# Patient Record
Sex: FEMALE | Race: WHITE | NOT HISPANIC OR LATINO | Employment: OTHER | URBAN - METROPOLITAN AREA
[De-identification: names, ages, dates, MRNs, and addresses within clinical notes are randomized per-mention and may not be internally consistent; named-entity substitution may affect disease eponyms.]

---

## 2017-07-07 ENCOUNTER — ALLSCRIPTS OFFICE VISIT (OUTPATIENT)
Dept: OTHER | Facility: OTHER | Age: 22
End: 2017-07-07

## 2017-09-21 ENCOUNTER — LAB REQUISITION (OUTPATIENT)
Dept: LAB | Facility: HOSPITAL | Age: 22
End: 2017-09-21
Payer: COMMERCIAL

## 2017-09-21 ENCOUNTER — GENERIC CONVERSION - ENCOUNTER (OUTPATIENT)
Dept: OTHER | Facility: OTHER | Age: 22
End: 2017-09-21

## 2017-09-21 DIAGNOSIS — Z01.419 ENCOUNTER FOR GYNECOLOGICAL EXAMINATION WITHOUT ABNORMAL FINDING: ICD-10-CM

## 2017-09-21 PROCEDURE — G0143 SCR C/V CYTO,THINLAYER,RESCR: HCPCS | Performed by: OBSTETRICS & GYNECOLOGY

## 2017-10-04 LAB
LAB AP GYN PRIMARY INTERPRETATION: NORMAL
LAB AP LMP: NORMAL
Lab: NORMAL

## 2017-10-05 ENCOUNTER — ALLSCRIPTS OFFICE VISIT (OUTPATIENT)
Dept: OTHER | Facility: OTHER | Age: 22
End: 2017-10-05

## 2017-10-05 ENCOUNTER — APPOINTMENT (OUTPATIENT)
Dept: LAB | Facility: HOSPITAL | Age: 22
End: 2017-10-05
Payer: COMMERCIAL

## 2017-10-05 DIAGNOSIS — J02.9 ACUTE PHARYNGITIS: ICD-10-CM

## 2017-10-05 LAB
FLUAV AG SPEC QL IA: NEGATIVE
INFLUENZA B AG (HISTORICAL): NEGATIVE
S PYO AG THROAT QL: NEGATIVE

## 2017-10-05 PROCEDURE — 87070 CULTURE OTHR SPECIMN AEROBIC: CPT

## 2017-10-06 NOTE — PROGRESS NOTES
Assessment  1  Sore throat (462) (J02 9)   2  Fever (780 60) (R50 9)    Plan  Fever    · Rapid Flu A and B- POC; Source:Nose; Status:Complete;   Done: 59WML5930 03:33PM  Sore throat    · Amoxicillin-Pot Clavulanate 875-125 MG Oral Tablet (Amoxicillin-Pot Clavulanate);  TAKE 1 TABLET EVERY 12 HOURS WITH MEALS UNTIL GONE   · Follow Up if Not Better Evaluation and Treatment  Follow-up  Status: Complete  Done:  70TUY8143   · Drink plenty of fluids ; Status:Complete;   Done: 69HMQ8704   · Gargle with warm salt water for 5 minutes every 4 hours ; Status:Complete;   Done:  52UQR1766   · Stay home from work or school until your condition is improved ; Status:Complete;    Done: 13KBV3745   · The following home treatments may soothe a sore throat ; Status:Complete;   Done:  77FPJ7884   · Call [phone] if: You have pain in your ear ; Status:Complete;   Done: 68IYW7261   · Seek Immediate Medical Attention if: You become dehydrated ; Status:Complete;   Done:  34LGL7084   · Seek Immediate Medical Attention if: You get a severe headache that seems different  from your usual ones ; Status:Complete;   Done: 03SNU7781   · Seek Immediate Medical Attention if: Your swallowing difficulty is worse ;  Status:Complete;   Done: 41CHC2670   · (1) THROAT CULTURE (CULTURE, UPPER RESPIRATORY); Status:Active; Requested  ELLIOTT:41UQA3203;    · Rapid StrepA- POC; Source:Throat; Status:Complete;   Done: 43OIV6744 03:25PM    Chief Complaint  1  Cold Symptoms  Patient here with sinus pressure in head and ears post nasal drip nasal congestion headache sore throat      History of Present Illness  HPI: sore throat started yesterdaytodayin AM   Cold Symptoms: Blinda Jointer presents with complaints of cold symptoms     Associated symptoms include sore throat,-facial pressure,-facial pain,-ear pain,-nausea,-fever-and-chills, but-no nasal congestion,-no runny nose,-no dry cough,-no productive cough,-no headache,-no plugged ear(s),-no wheezing,-no shortness of breath,-no vomiting-and-no diarrhea  Review of Systems    Constitutional: as noted in HPI    ENT: as noted in HPI  Cardiovascular: as noted in HPI  Respiratory: as noted in HPI  Breasts: no complaints of breast pain, breast lump or nipple discharge  Gastrointestinal: as noted in HPI  Genitourinary: no complaints of dysuria, no incontinence, no pelvic pain, no dysmenorrhea, no vaginal discharge or abnormal vaginal bleeding  Musculoskeletal: no complaints of arthralgia, no myalgia, no joint swelling or stiffness, no limb pain or swelling  Integumentary: no complaints of skin rash or lesion, no itching or dry skin, no skin wounds  Neurological: no complaints of headache, no confusion, no numbness or tingling, no dizziness or fainting  Active Problems  1  Encounter for annual routine gynecological examination (V72 31) (Z01 419)   2  Encounter for PPD test (V74 1) (Z11 1)   3  Need for DTaP vaccine (V06 1) (Z23)   4  Need for prophylactic vaccination and inoculation against influenza (V04 81) (Z23)   5  Oral contraceptive prescribed (V25 01) (Z30 011)   6  Well adult on routine health check (V70 0) (Z00 00)    Family History  Mother    1  Family history of Healthy adult  Father    2  Family history of Diabetes  Family History Reviewed: The family history was reviewed and updated today  Social History   · Drinks coffee   · Never a smoker   · Rarely  The social history was reviewed and updated today  Surgical History  1  Denied: History Of Prior Surgery  Surgical History Reviewed: The surgical history was reviewed and updated today  Current Meds   1  Aviane 0 1-20 MG-MCG Oral Tablet; Take 1 tablet daily; Therapy: 88KCV2698 to (Evaluate:29Juq9924)  Requested for: 92Xfq5151; Last   Rx:31Cqt6602 Ordered    The medication list was reviewed and updated today  Allergies  1  No Known Drug Allergies  2  No Known Environmental Allergies   3   No Known Food Allergies    Vitals   Recorded: 29EZL4801 03:07PM   Temperature 102 F   Heart Rate 72   Respiration 18   Systolic 594   Diastolic 80   Height 5 ft 4 5 in   Weight 169 lb    BMI Calculated 28 56   BSA Calculated 1 83     Physical Exam    Constitutional   General appearance: No acute distress, well appearing and well nourished  Eyes   Conjunctiva and lids: No swelling, erythema or discharge  Pupils and irises: Equal, round and reactive to light  Ears, Nose, Mouth, and Throat   External inspection of ears and nose: Normal     Otoscopic examination: Tympanic membranes translucent with normal light reflex  Canals patent without erythema  Nasal mucosa, septum, and turbinates: Normal without edema or erythema  Oropharynx: Abnormal     Pulmonary   Respiratory effort: No increased work of breathing or signs of respiratory distress  Auscultation of lungs: Clear to auscultation  Cardiovascular   Auscultation of heart: Normal rate and rhythm, normal S1 and S2, without murmurs  Examination of extremities for edema and/or varicosities: Normal     Abdomen   Abdomen: Non-tender, no masses  Liver and spleen: No hepatomegaly or splenomegaly      Lymphatic   Palpation of lymph nodes in neck: Abnormal     Musculoskeletal   Gait and station: Normal          Results/Data  Rapid Flu A and B- POC 38BRK4505 03:33PM Cortney Monroe     Test Name Result Flag Reference   Rapid Flu A Negative     Rapid Flu B Negative       Rapid StrepA- POC 26QXD9824 03:25PM Cortney Monroe     Test Name Result Flag Reference   Rapid Strep Negative         Future Appointments    Date/Time Provider Specialty Site   07/10/2018 08:30 AM Patti Reid DO Family Medicine [facility] [facility]     Signatures   Electronically signed by : Bonnie Jesus DO; Oct  5 2017  3:36PM EST                       (Author)

## 2017-10-07 LAB — BACTERIA THROAT CULT: NORMAL

## 2017-10-08 ENCOUNTER — GENERIC CONVERSION - ENCOUNTER (OUTPATIENT)
Dept: OTHER | Facility: OTHER | Age: 22
End: 2017-10-08

## 2017-12-29 ENCOUNTER — ALLSCRIPTS OFFICE VISIT (OUTPATIENT)
Dept: OTHER | Facility: OTHER | Age: 22
End: 2017-12-29

## 2017-12-30 NOTE — PROGRESS NOTES
Assessment   1  Acute otitis media (382 9) (T04 90)    Plan   Acute otitis media    · Amoxicillin-Pot Clavulanate 875-125 MG Oral Tablet; TAKE 1 TABLET EVERY 12    HOURS WITH MEALS UNTIL GONE   · Ofloxacin 0 3 % Otic Solution; INSTILL 10 DROPS INTO THE AFFECTED EAR    ONCE DAILY FOR 7 DAYS    Discussion/Summary      Antibiotic as ordered drops as ordered otc per package instructions per package instructions with any problems      Possible side effects of new medications were reviewed with the patient/guardian today  The treatment plan was reviewed with the patient/guardian  The patient/guardian understands and agrees with the treatment plan      Chief Complaint   PT is being seen today due to having ear clogged with pain  History of Present Illness   HPI: LEFT EAR PAIN AND TROUBLE HEARING WITH SORE THROAT DRAINAGE FROM EAR FEVERS CONGESTION 3 WEEKS PREGNANT      Review of Systems        Constitutional: No fever, no chills, feels well, no tiredness, no recent weight gain or loss  ENT: as noted in HPI  Cardiovascular: no complaints of slow or fast heart rate, no chest pain, no palpitations, no leg claudication or lower extremity edema  Respiratory: as noted in HPI  Integumentary: no complaints of skin rash or lesion, no itching or dry skin, no skin wounds  Neurological: no complaints of headache, no confusion, no numbness or tingling, no dizziness or fainting  Active Problems   1  Oral contraceptive prescribed (V25 01) (Z30 011)   2  Well adult on routine health check (V70 0) (Z00 00)    Past Medical History   Active Problems And Past Medical History Reviewed: The active problems and past medical history were reviewed and updated today  Family History   Mother    1  Family history of Healthy adult  Father    2  Family history of Diabetes  Family History Reviewed: The family history was reviewed and updated today         Social History    · Drinks coffee   · Never a smoker   · Rarely  The social history was reviewed and updated today  Surgical History   1  Denied: History Of Prior Surgery  Surgical History Reviewed: The surgical history was reviewed and updated today  Current Meds    1  Aviane 0 1-20 MG-MCG Oral Tablet; Take 1 tablet daily; Therapy: 72AIL1940 to (Evaluate:42Ibi0578)  Requested for: 00Vtm6990; Last     Rx:64Jcf6808 Ordered     The medication list was reviewed and updated today  Allergies   1  No Known Drug Allergies  2  No Known Environmental Allergies   3  No Known Food Allergies    Vitals    Recorded: 29Dec2017 01:56PM   Temperature 98 1 F   Heart Rate 72   Respiration 16   Systolic 868   Diastolic 80   Height 5 ft 4 5 in   Weight 172 lb 6 oz   BMI Calculated 29 13   BSA Calculated 1 85     Physical Exam        Constitutional      General appearance: No acute distress, well appearing and well nourished  Eyes      Conjunctiva and lids: No swelling, erythema or discharge  Ears, Nose, Mouth, and Throat      External inspection of ears and nose: Normal        Otoscopic examination: Abnormal   The right tympanic membrane was normal  The left tympanic membrane was red-- and-- was bulging  The right external canal was normal  The left external canal was normal       Nasal mucosa, septum, and turbinates: Abnormal   normal nasal septum,-- no intranasal masses or polyps-- and-- normal nasal turbinates  There was a mucoid discharge from both nares  The bilateral nasal mucosa was edematous-- and-- red  Oropharynx: Abnormal   The posterior pharynx was erythematous, but-- did not have an exudate  Pulmonary      Respiratory effort: No increased work of breathing or signs of respiratory distress  Auscultation of lungs: Clear to auscultation  Cardiovascular      Auscultation of heart: Normal rate and rhythm, normal S1 and S2, without murmurs  Abdomen      Abdomen: Non-tender, no masses         Lymphatic Palpation of lymph nodes in neck: No lymphadenopathy  Musculoskeletal      Gait and station: Normal        Skin      Skin and subcutaneous tissue: Normal without rashes or lesions         Psychiatric      Orientation to person, place, and time: Normal        Mood and affect: Normal           Future Appointments      Date/Time Provider Specialty Site   07/10/2018 08:30 AM Kyler Roy 12    Electronically signed by : Nat Vann [address] [place] [address]; Dec 29 2017  2:35PM EST                       (Author)     Electronically signed by : Senthil Delgadillo MD; Dec 29 2017  3:21PM EST                       (Author)

## 2018-01-10 NOTE — RESULT NOTES
Verified Results  (1) THROAT CULTURE (CULTURE, UPPER RESPIRATORY) 05Oct2017 07:31PM Marisol Ja Order Number: [id number]     Test Name Result Flag Reference   CLINICAL REPORT (Report)     Test:        Throat culture  Specimen Type:   Throat  Specimen Date:   10/5/2017 7:31 PM  Result Date:    10/7/2017 8:31 AM  Result Status:   Final result  Resulting Lab:   [address] [address]            Tel: [phone]      CULTURE                                       ------------------                                   Negative for beta-hemolytic Streptococcus

## 2018-01-13 VITALS
SYSTOLIC BLOOD PRESSURE: 110 MMHG | RESPIRATION RATE: 18 BRPM | HEART RATE: 72 BPM | TEMPERATURE: 102 F | HEIGHT: 65 IN | DIASTOLIC BLOOD PRESSURE: 80 MMHG | BODY MASS INDEX: 28.16 KG/M2 | WEIGHT: 169 LBS

## 2018-01-13 NOTE — PROGRESS NOTES
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, clearing her medically  The patient was counseled regarding instructions for management, impressions, importance of compliance with treatment  The treatment plan was reviewed with the patient/guardian  The patient/guardian understands and agrees with the treatment plan      Chief Complaint  PT is being seen today for a HM visit  PT also had a TB test for her job  History of Present Illness  HPI: Recent graduate of [employer] in Education  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 [employer]  Seeing the Dentist   Immunizations are UTD - needed PPD today  Has started PAP smears with her OB/GYN  Pt exercises regularly  Non-smoker  No CP/SOB  No recent illnesses  No abd pain  Regular menses  No new breast lumps  No anxiety or depression  Review of Systems    ROS reviewed  Active Problems    1  Encounter for PPD test (V74 1) (Z11 1)   2  Need for DTaP vaccine (V06 1) (Z23)   3  Need for prophylactic vaccination and inoculation against influenza (V04 81) (Z23)   4  Oral contraceptive prescribed (V25 01) (Z30 011)   5  Well adult on routine health check (V70 0) (Z00 00)    Surgical History    · Denied: History Of Prior Surgery    Family History  Mother    · Family history of Healthy adult  Father    · Family history of Diabetes    Social History    · Drinks coffee   · Never a smoker   · Rarely    Current Meds   1   Aviane 0 1-20 MG-MCG Oral Tablet; TAKE 1 TABLET DAILY AS DIRECTED; Therapy: 26UMW0011 to (Evaluate:08Jun2017)  Requested for: 32Xla6446; Last   Rx:58Tqy3001 Ordered    Allergies    1  No Known Drug Allergies    2  No Known Environmental Allergies   3  No Known Food Allergies    Vitals   Recorded: 20YYH5864 08:31AM   Heart Rate 72   Respiration 16   Systolic 98   Diastolic 70   Height 5 ft 4 5 in   Weight 171 lb 6 oz   BMI Calculated 28 96   BSA Calculated 1 84     Physical Exam    Constitutional   General Appearance: No acute distress  NAD; VSS; pleasant  Head and Face   Head and face: Atraumatic on inspection  Facial strength normal    External inspection of ears and nose: Normal       Neck   Neck and thyroid: Normal to inspection and palpation    Pulmonary   Respiratory effort: Normal respiration, unlabored breathing  Auscultation of lungs: Clear to auscultation  Cardiovascular   Auscultation of heart: Rate is regular  Rhythm is regular   Abdomen   Abdomen: Soft, non-distended, non-tender  Abdomen: Positive bowel sounds  Musculoskeletal   Gait and Station: Walks with normal gait  Tandem walk test is normal  Romberg's test is negative          Neurologic   Cognition: No focal neurological deficits, intact cognition  Mental Status: Mood is normal  Affect is normal  Memory is intact  (Concentration) No apparent agnosia present  Thought process appears clear and appropriate  Judgment and insight: Normal          Results/Data  PHQ-2 Adult Depression Screening 79BAX0422 08:30AM User, Ahs     Test Name Result Flag Reference   PHQ-2 Adult Depression Score 0     Over the last two weeks, how often have you been bothered by any of the following problems?   Little interest or pleasure in doing things: Not at all - 0  Feeling down, depressed, or hopeless: Not at all - 0   PHQ-2 Adult Depression Screening Negative         Signatures   Electronically signed by : Peter Bello DO; Jul 7 2017  8:57AM EST                       (Author)

## 2018-01-18 NOTE — MISCELLANEOUS
Message  Return to work or school:  10/05/2017   She is able to return to work on  10/09/2017      PT WAS SEEN TODAY 10/05/2017 AND CAN RETURN ON MONDAY 10/09/2017  DR Dick Class        Signatures   Electronically signed by : Shaheen Reardon, ; Oct  5 2017  3:39PM EST                       (Author)

## 2018-01-22 VITALS
BODY MASS INDEX: 28.55 KG/M2 | SYSTOLIC BLOOD PRESSURE: 98 MMHG | RESPIRATION RATE: 16 BRPM | DIASTOLIC BLOOD PRESSURE: 70 MMHG | HEART RATE: 72 BPM | HEIGHT: 65 IN | WEIGHT: 171.38 LBS

## 2018-01-22 VITALS
HEIGHT: 65 IN | WEIGHT: 170 LBS | SYSTOLIC BLOOD PRESSURE: 100 MMHG | DIASTOLIC BLOOD PRESSURE: 64 MMHG | BODY MASS INDEX: 28.32 KG/M2

## 2018-01-23 VITALS
HEIGHT: 65 IN | DIASTOLIC BLOOD PRESSURE: 80 MMHG | HEART RATE: 72 BPM | RESPIRATION RATE: 16 BRPM | TEMPERATURE: 98.1 F | BODY MASS INDEX: 28.72 KG/M2 | SYSTOLIC BLOOD PRESSURE: 100 MMHG | WEIGHT: 172.38 LBS

## 2018-02-01 ENCOUNTER — TELEPHONE (OUTPATIENT)
Dept: FAMILY MEDICINE CLINIC | Facility: CLINIC | Age: 23
End: 2018-02-01

## 2018-02-01 DIAGNOSIS — Z20.828 EXPOSURE TO INFLUENZA: Primary | ICD-10-CM

## 2018-02-01 RX ORDER — OSELTAMIVIR PHOSPHATE 75 MG/1
75 CAPSULE ORAL DAILY
Qty: 10 CAPSULE | Refills: 0 | Status: SHIPPED | OUTPATIENT
Start: 2018-02-01 | End: 2018-02-11

## 2018-08-26 DIAGNOSIS — Z30.41 ENCOUNTER FOR SURVEILLANCE OF CONTRACEPTIVE PILLS: Primary | ICD-10-CM

## 2018-08-26 RX ORDER — LEVONORGESTREL AND ETHINYL ESTRADIOL 0.1-0.02MG
KIT ORAL
Qty: 28 TABLET | Refills: 11 | OUTPATIENT
Start: 2018-08-26

## 2018-08-27 DIAGNOSIS — Z30.41 ENCOUNTER FOR SURVEILLANCE OF CONTRACEPTIVE PILLS: Primary | ICD-10-CM

## 2018-08-27 RX ORDER — LEVONORGESTREL AND ETHINYL ESTRADIOL 0.1-0.02MG
1 KIT ORAL DAILY
Qty: 28 TABLET | Refills: 0 | Status: SHIPPED | OUTPATIENT
Start: 2018-08-27 | End: 2018-09-19 | Stop reason: SDUPTHER

## 2018-09-19 ENCOUNTER — ANNUAL EXAM (OUTPATIENT)
Dept: OBGYN CLINIC | Facility: CLINIC | Age: 23
End: 2018-09-19
Payer: COMMERCIAL

## 2018-09-19 ENCOUNTER — LAB REQUISITION (OUTPATIENT)
Dept: LAB | Facility: HOSPITAL | Age: 23
End: 2018-09-19
Payer: COMMERCIAL

## 2018-09-19 VITALS
HEIGHT: 65 IN | BODY MASS INDEX: 25.69 KG/M2 | SYSTOLIC BLOOD PRESSURE: 108 MMHG | WEIGHT: 154.2 LBS | DIASTOLIC BLOOD PRESSURE: 70 MMHG

## 2018-09-19 DIAGNOSIS — Z11.3 ENCOUNTER FOR SCREENING FOR INFECTIONS WITH PREDOMINANTLY SEXUAL MODE OF TRANSMISSION: ICD-10-CM

## 2018-09-19 DIAGNOSIS — Z11.3 SCREEN FOR STD (SEXUALLY TRANSMITTED DISEASE): ICD-10-CM

## 2018-09-19 DIAGNOSIS — Z01.419 ENCOUNTER FOR ANNUAL ROUTINE GYNECOLOGICAL EXAMINATION: Primary | ICD-10-CM

## 2018-09-19 DIAGNOSIS — Z30.41 ENCOUNTER FOR SURVEILLANCE OF CONTRACEPTIVE PILLS: ICD-10-CM

## 2018-09-19 PROCEDURE — 87591 N.GONORRHOEAE DNA AMP PROB: CPT | Performed by: OBSTETRICS & GYNECOLOGY

## 2018-09-19 PROCEDURE — 87661 TRICHOMONAS VAGINALIS AMPLIF: CPT | Performed by: OBSTETRICS & GYNECOLOGY

## 2018-09-19 PROCEDURE — S0612 ANNUAL GYNECOLOGICAL EXAMINA: HCPCS | Performed by: OBSTETRICS & GYNECOLOGY

## 2018-09-19 PROCEDURE — 87491 CHLMYD TRACH DNA AMP PROBE: CPT | Performed by: OBSTETRICS & GYNECOLOGY

## 2018-09-19 RX ORDER — LEVONORGESTREL AND ETHINYL ESTRADIOL 0.1-0.02MG
1 KIT ORAL DAILY
Qty: 28 TABLET | Refills: 11 | Status: SHIPPED | OUTPATIENT
Start: 2018-09-19 | End: 2019-09-06 | Stop reason: SDUPTHER

## 2018-09-19 NOTE — PROGRESS NOTES
Assessment/Plan:    Pap smear deferred due to low risk status  Cultures were obtained for leukorrhea panel  Encouraged self-breast examination as well as calcium supplementation  Continue OCPs x1 year  Return to office in 1 year  No problem-specific Assessment & Plan notes found for this encounter  Diagnoses and all orders for this visit:    Encounter for annual routine gynecological examination    Encounter for surveillance of contraceptive pills  -     levonorgestrel-ethinyl estradiol (AVIANE,ALESSE,LESSINA) 0 1-20 MG-MCG per tablet; Take 1 tablet by mouth daily          Subjective:      Patient ID: Radha Babcock is a 21 y o  female  HPI     This is a 27-year-old female G0 who presents for her annual gyn exam   She states her menstrual cycles are regular every 4 weeks lasting 4 days with no breakthrough bleeding  She denies any changes in bowel or bladder function  She is sexually active and has been in a monogamous relationship for the last 6 months  She does use condoms most of the time  She did receive the Gardasil vaccine at age 16  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, teaching third grade  Over the summer she did go abroad to [facility] ent each angle age for 5 weeks  The following portions of the patient's history were reviewed and updated as appropriate: allergies, current medications, past family history, past medical history, past social history, past surgical history and problem list     Review of Systems   Constitutional: Negative for fatigue, fever and unexpected weight change  Respiratory: Negative for cough, chest tightness, shortness of breath and wheezing  Cardiovascular: Negative  Negative for chest pain and palpitations  Gastrointestinal: Negative  Negative for abdominal distention, abdominal pain, blood in stool, constipation, diarrhea, nausea and vomiting  Genitourinary: Negative    Negative for difficulty urinating, dyspareunia, dysuria, flank pain, frequency, genital sores, hematuria, pelvic pain, urgency, vaginal bleeding, vaginal discharge and vaginal pain  Skin: Negative for rash  Objective:      /70   Ht 5' 5" (1 651 m)   Wt 69 9 kg (154 lb 3 2 oz)   LMP 09/12/2018 (Exact Date)   Breastfeeding? No   BMI 25 66 kg/m²          Physical Exam   Constitutional: She appears well-developed and well-nourished  Cardiovascular: Normal rate and regular rhythm  Pulmonary/Chest: Effort normal and breath sounds normal  Right breast exhibits no inverted nipple, no mass, no nipple discharge, no skin change and no tenderness  Left breast exhibits no inverted nipple, no mass, no nipple discharge, no skin change and no tenderness  Abdominal: Soft  Bowel sounds are normal  She exhibits no distension  There is no tenderness  There is no rebound and no guarding  Genitourinary: Vagina normal and uterus normal  There is no lesion on the right labia  There is no lesion on the left labia  Cervix exhibits no discharge and no friability  Right adnexum displays no mass, no tenderness and no fullness  Left adnexum displays no mass, no tenderness and no fullness  No vaginal discharge found

## 2018-09-21 LAB
CHLAMYDIA DNA CVX QL NAA+PROBE: NORMAL
N GONORRHOEA DNA GENITAL QL NAA+PROBE: NORMAL

## 2018-09-26 LAB — T VAGINALIS RRNA SPEC QL NAA+PROBE: NEGATIVE

## 2019-05-13 ENCOUNTER — TELEPHONE (OUTPATIENT)
Dept: OBGYN CLINIC | Facility: CLINIC | Age: 24
End: 2019-05-13

## 2019-09-06 DIAGNOSIS — Z30.41 ENCOUNTER FOR SURVEILLANCE OF CONTRACEPTIVE PILLS: ICD-10-CM

## 2019-09-08 RX ORDER — LEVONORGESTREL AND ETHINYL ESTRADIOL 0.1-0.02MG
KIT ORAL
Qty: 28 TABLET | Refills: 0 | Status: SHIPPED | OUTPATIENT
Start: 2019-09-08 | End: 2019-10-09 | Stop reason: SDUPTHER

## 2019-10-09 ENCOUNTER — ANNUAL EXAM (OUTPATIENT)
Dept: OBGYN CLINIC | Facility: CLINIC | Age: 24
End: 2019-10-09
Payer: COMMERCIAL

## 2019-10-09 VITALS
SYSTOLIC BLOOD PRESSURE: 132 MMHG | WEIGHT: 150.8 LBS | BODY MASS INDEX: 25.12 KG/M2 | HEIGHT: 65 IN | DIASTOLIC BLOOD PRESSURE: 76 MMHG

## 2019-10-09 DIAGNOSIS — Z30.41 ENCOUNTER FOR SURVEILLANCE OF CONTRACEPTIVE PILLS: ICD-10-CM

## 2019-10-09 DIAGNOSIS — Z01.419 ENCOUNTER FOR ANNUAL ROUTINE GYNECOLOGICAL EXAMINATION: Primary | ICD-10-CM

## 2019-10-09 PROCEDURE — S0612 ANNUAL GYNECOLOGICAL EXAMINA: HCPCS | Performed by: OBSTETRICS & GYNECOLOGY

## 2019-10-09 PROCEDURE — G0145 SCR C/V CYTO,THINLAYER,RESCR: HCPCS | Performed by: OBSTETRICS & GYNECOLOGY

## 2019-10-09 RX ORDER — LEVONORGESTREL AND ETHINYL ESTRADIOL 0.1-0.02MG
1 KIT ORAL DAILY
Qty: 28 TABLET | Refills: 11 | Status: SHIPPED | OUTPATIENT
Start: 2019-10-09 | End: 2020-09-08

## 2019-10-09 NOTE — PROGRESS NOTES
Assessment/Plan:  Pap smear done as well as annual    Offered STD screening, patient declines  Encouraged self-breast examination as well as calcium supplementation  Continue OCPs x1 year  Return to office in 1 year or p r n  No problem-specific Assessment & Plan notes found for this encounter  Diagnoses and all orders for this visit:    Encounter for annual routine gynecological examination    Encounter for surveillance of contraceptive pills  -     levonorgestrel-ethinyl estradiol (ORSYTHIA) 0 1-20 MG-MCG per tablet; Take 1 tablet by mouth daily          Subjective:      Patient ID: Dustin Jacobo is a 25 y o  female  HPI     This is a 63-year-old female G0 who presents for annual gyn exam   She offers no complaints today  Her menstrual cycles are regular every 28 days lasting 4 days with no breakthrough bleeding  She denies any changes in bowel or bladder function  She is sexually active and has been in a monogamous relationship for close to 2 years  She does not use condoms regularly  She did receive the Gardasil vaccine at age 16  Her Pap smears have been normal   Her sister was diagnosed with stage IB 1 cervical cancer at the age of 29 this past year  She underwent conservative surgery as she was interested in future pregnancies  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, teaching third grade  The following portions of the patient's history were reviewed and updated as appropriate: allergies, current medications, past family history, past medical history, past social history, past surgical history and problem list     Review of Systems   Constitutional: Negative for fatigue, fever and unexpected weight change  Respiratory: Negative for cough, chest tightness, shortness of breath and wheezing  Cardiovascular: Negative  Negative for chest pain and palpitations  Gastrointestinal: Negative    Negative for abdominal distention, abdominal pain, blood in stool, constipation, diarrhea, nausea and vomiting  Genitourinary: Negative  Negative for difficulty urinating, dyspareunia, dysuria, flank pain, frequency, genital sores, hematuria, pelvic pain, urgency, vaginal bleeding, vaginal discharge and vaginal pain  Skin: Negative for rash  Objective:      /76   Ht 5' 5" (1 651 m)   Wt 68 4 kg (150 lb 12 8 oz)   LMP 10/09/2019 (Exact Date)   Breastfeeding? No   BMI 25 09 kg/m²          Physical Exam   Constitutional: She appears well-developed and well-nourished  Cardiovascular: Normal rate and regular rhythm  Pulmonary/Chest: Effort normal and breath sounds normal  Right breast exhibits no inverted nipple, no mass, no nipple discharge, no skin change and no tenderness  Left breast exhibits no inverted nipple, no mass, no nipple discharge, no skin change and no tenderness  Abdominal: Soft  Bowel sounds are normal  She exhibits no distension  There is no tenderness  There is no rebound and no guarding  Genitourinary: Vagina normal and uterus normal  There is no lesion on the right labia  There is no lesion on the left labia  Cervix exhibits no discharge and no friability  Right adnexum displays no mass, no tenderness and no fullness  Left adnexum displays no mass, no tenderness and no fullness  No vaginal discharge found  Genitourinary Comments: Patient is menstruating today

## 2019-10-17 LAB
LAB AP GYN PRIMARY INTERPRETATION: NORMAL
LAB AP LMP: NORMAL
Lab: NORMAL

## 2020-07-21 ENCOUNTER — OFFICE VISIT (OUTPATIENT)
Dept: FAMILY MEDICINE CLINIC | Facility: CLINIC | Age: 25
End: 2020-07-21
Payer: COMMERCIAL

## 2020-07-21 VITALS
TEMPERATURE: 98.4 F | RESPIRATION RATE: 14 BRPM | BODY MASS INDEX: 25.19 KG/M2 | HEART RATE: 75 BPM | SYSTOLIC BLOOD PRESSURE: 100 MMHG | WEIGHT: 151.2 LBS | DIASTOLIC BLOOD PRESSURE: 74 MMHG | HEIGHT: 65 IN | OXYGEN SATURATION: 95 %

## 2020-07-21 DIAGNOSIS — F32.1 CURRENT MODERATE EPISODE OF MAJOR DEPRESSIVE DISORDER WITHOUT PRIOR EPISODE (HCC): Primary | ICD-10-CM

## 2020-07-21 PROBLEM — Z20.828 EXPOSURE TO INFLUENZA: Status: RESOLVED | Noted: 2018-02-01 | Resolved: 2020-07-21

## 2020-07-21 PROCEDURE — 99213 OFFICE O/P EST LOW 20 MIN: CPT | Performed by: FAMILY MEDICINE

## 2020-07-21 PROCEDURE — 3008F BODY MASS INDEX DOCD: CPT | Performed by: FAMILY MEDICINE

## 2020-07-21 PROCEDURE — 1036F TOBACCO NON-USER: CPT | Performed by: FAMILY MEDICINE

## 2020-07-21 NOTE — PROGRESS NOTES
Assessment/Plan:    1  Current moderate episode of major depressive disorder without prior episode [facility])  Assessment & Plan:  Start zoloft  1/2 tab  Side effects discussed  Cont therapy  Follow up in 4 weeks    Orders:  -     sertraline (ZOLOFT) 50 mg tablet; Take 1 tablet (50 mg total) by mouth daily    BMI Counseling: Body mass index is 25 16 kg/m²  The BMI is above normal  Nutrition recommendations include encouraging healthy choices of fruits and vegetables  Exercise recommendations include exercising 3-5 times per week  No pharmacotherapy was ordered  There are no Patient Instructions on file for this visit  No follow-ups on file  Subjective:      Patient ID: Nohemy Escudero is a 22 y o  female  Chief Complaint   Patient presents with    Depression     Patient would like to go on antidepressants        Seeing therapist  This past year has struggling with depression  No major life trauma  Therapy has helped  Feels depression the last few months  Prior felt more anxious    Depression   This is a chronic problem  The current episode started more than 1 year ago  The problem occurs constantly  The problem has been unchanged  Nothing aggravates the symptoms  Treatments tried: therapy  The treatment provided mild relief  The following portions of the patient's history were reviewed and updated as appropriate: allergies, current medications, past family history, past medical history, past social history, past surgical history and problem list     Review of Systems   Constitutional: Negative  HENT: Negative  Eyes: Negative  Respiratory: Negative  Cardiovascular: Negative  Gastrointestinal: Negative  Endocrine: Negative  Genitourinary: Negative  Musculoskeletal: Negative  Skin: Negative  Allergic/Immunologic: Negative  Neurological: Negative  Hematological: Negative  Psychiatric/Behavioral: Positive for depression, dysphoric mood and sleep disturbance  Negative for suicidal ideas  The patient is nervous/anxious  Current Outpatient Medications   Medication Sig Dispense Refill    levonorgestrel-ethinyl estradiol (ORSYTHIA) 0 1-20 MG-MCG per tablet Take 1 tablet by mouth daily 28 tablet 11    sertraline (ZOLOFT) 50 mg tablet Take 1 tablet (50 mg total) by mouth daily 30 tablet 5     No current facility-administered medications for this visit  Objective:    /74   Pulse 75   Temp 98 4 °F (36 9 °C)   Resp 14   Ht 5' 5" (1 651 m)   Wt 68 6 kg (151 lb 3 2 oz)   SpO2 95%   BMI 25 16 kg/m²        Physical Exam   Constitutional: She appears well-developed and well-nourished  HENT:   Head: Normocephalic and atraumatic  Eyes: Pupils are equal, round, and reactive to light  EOM are normal    Neck: Normal range of motion  Neck supple  Cardiovascular: Normal rate, regular rhythm, normal heart sounds and intact distal pulses  Pulmonary/Chest: Effort normal and breath sounds normal    Psychiatric: Her speech is normal and behavior is normal  Judgment and thought content normal  Cognition and memory are normal  She exhibits a depressed mood  tearful   Nursing note and vitals reviewed               Yulisa Bibanju, DO

## 2020-07-22 ENCOUNTER — TELEPHONE (OUTPATIENT)
Dept: FAMILY MEDICINE CLINIC | Facility: CLINIC | Age: 25
End: 2020-07-22

## 2020-07-22 NOTE — TELEPHONE ENCOUNTER
Dominga Hayes called stating that in her appt yesterday with Dr Queta Aldrich it was discussed to start taking 25 mg of Zoloft  Should she cut the 50 mg pill in half? How long should she take the 25mg before taking 50mg?  Please advise

## 2020-07-22 NOTE — TELEPHONE ENCOUNTER
Dr Raheem Crowder wants Maury Ridley to have a follow-up in 4 weeks, which would be the week of 08/17/20, however, she has no open appts  Where can I fit her in? She goes back to work 08/24/20 so that week would not be good  Please advise  Thank  You!

## 2020-08-19 ENCOUNTER — OFFICE VISIT (OUTPATIENT)
Dept: FAMILY MEDICINE CLINIC | Facility: CLINIC | Age: 25
End: 2020-08-19
Payer: COMMERCIAL

## 2020-08-19 VITALS
HEIGHT: 65 IN | HEART RATE: 81 BPM | RESPIRATION RATE: 14 BRPM | WEIGHT: 152.6 LBS | DIASTOLIC BLOOD PRESSURE: 60 MMHG | SYSTOLIC BLOOD PRESSURE: 110 MMHG | BODY MASS INDEX: 25.43 KG/M2 | OXYGEN SATURATION: 99 % | TEMPERATURE: 98.9 F

## 2020-08-19 DIAGNOSIS — F32.1 CURRENT MODERATE EPISODE OF MAJOR DEPRESSIVE DISORDER WITHOUT PRIOR EPISODE (HCC): Primary | ICD-10-CM

## 2020-08-19 PROCEDURE — 99213 OFFICE O/P EST LOW 20 MIN: CPT | Performed by: FAMILY MEDICINE

## 2020-08-19 PROCEDURE — 1036F TOBACCO NON-USER: CPT | Performed by: FAMILY MEDICINE

## 2020-08-19 PROCEDURE — 3008F BODY MASS INDEX DOCD: CPT | Performed by: FAMILY MEDICINE

## 2020-08-19 PROCEDURE — 3725F SCREEN DEPRESSION PERFORMED: CPT | Performed by: FAMILY MEDICINE

## 2020-08-19 NOTE — PROGRESS NOTES
Assessment/Plan:    1  Current moderate episode of major depressive disorder without prior episode [facility])  Assessment & Plan:  Improved with zoloft            There are no Patient Instructions on file for this visit  Return in about 3 months (around 11/19/2020)  Subjective:      Patient ID: Tamar Hernandez is a 22 y o  female  Chief Complaint   Patient presents with    Follow-up     Patient here for 4 week follow up on Depression       Here for follow up  Improved depression  No nausea  Sleep has been impacted  Usually does before school     Depression   The problem occurs constantly  She has tried sleep for the symptoms  The following portions of the patient's history were reviewed and updated as appropriate: allergies, current medications, past family history, past medical history, past social history, past surgical history and problem list     Review of Systems   Constitutional: Negative  HENT: Negative  Eyes: Negative  Respiratory: Negative  Cardiovascular: Negative  Endocrine: Negative  Genitourinary: Negative  Musculoskeletal: Negative  Skin: Negative  Allergic/Immunologic: Negative  Neurological: Negative  Hematological: Negative  Psychiatric/Behavioral: Positive for depression and dysphoric mood  Current Outpatient Medications   Medication Sig Dispense Refill    levonorgestrel-ethinyl estradiol (ORSYTHIA) 0 1-20 MG-MCG per tablet Take 1 tablet by mouth daily 28 tablet 11    sertraline (ZOLOFT) 50 mg tablet Take 1 tablet (50 mg total) by mouth daily 30 tablet 5     No current facility-administered medications for this visit  Objective:    /60   Pulse 81   Temp 98 9 °F (37 2 °C)   Resp 14   Ht 5' 5" (1 651 m)   Wt 69 2 kg (152 lb 9 6 oz)   SpO2 99%   BMI 25 39 kg/m²        Physical Exam  Vitals signs and nursing note reviewed  Constitutional:       Appearance: Normal appearance  HENT:      Head: Normocephalic and atraumatic  Eyes:      Extraocular Movements: Extraocular movements intact  Pupils: Pupils are equal, round, and reactive to light  Neck:      Musculoskeletal: Normal range of motion and neck supple  Cardiovascular:      Rate and Rhythm: Normal rate and regular rhythm  Pulmonary:      Effort: Pulmonary effort is normal       Breath sounds: Normal breath sounds  Abdominal:      General: Abdomen is flat  Skin:     General: Skin is warm  Capillary Refill: Capillary refill takes less than 2 seconds  Neurological:      General: No focal deficit present  Mental Status: She is alert  Mental status is at baseline  She is disoriented  Psychiatric:         Mood and Affect: Mood normal          Behavior: Behavior normal          Thought Content:  Thought content normal          Judgment: Judgment normal                 Kongl Aramk, DO

## 2020-09-08 DIAGNOSIS — Z30.41 ENCOUNTER FOR SURVEILLANCE OF CONTRACEPTIVE PILLS: ICD-10-CM

## 2020-09-08 RX ORDER — LEVONORGESTREL AND ETHINYL ESTRADIOL 0.1-0.02MG
KIT ORAL
Qty: 28 TABLET | Refills: 0 | Status: SHIPPED | OUTPATIENT
Start: 2020-09-08 | End: 2020-10-06

## 2020-09-21 DIAGNOSIS — F32.1 CURRENT MODERATE EPISODE OF MAJOR DEPRESSIVE DISORDER WITHOUT PRIOR EPISODE (HCC): ICD-10-CM

## 2020-10-06 DIAGNOSIS — Z30.41 ENCOUNTER FOR SURVEILLANCE OF CONTRACEPTIVE PILLS: ICD-10-CM

## 2020-10-06 RX ORDER — LEVONORGESTREL AND ETHINYL ESTRADIOL 0.1-0.02MG
KIT ORAL
Qty: 28 TABLET | Refills: 0 | Status: SHIPPED | OUTPATIENT
Start: 2020-10-06 | End: 2020-11-03

## 2020-11-03 DIAGNOSIS — Z30.41 ENCOUNTER FOR SURVEILLANCE OF CONTRACEPTIVE PILLS: ICD-10-CM

## 2020-11-03 RX ORDER — LEVONORGESTREL AND ETHINYL ESTRADIOL 0.1-0.02MG
KIT ORAL
Qty: 28 TABLET | Refills: 0 | Status: SHIPPED | OUTPATIENT
Start: 2020-11-03 | End: 2020-12-01

## 2020-12-01 ENCOUNTER — OFFICE VISIT (OUTPATIENT)
Dept: FAMILY MEDICINE CLINIC | Facility: CLINIC | Age: 25
End: 2020-12-01
Payer: COMMERCIAL

## 2020-12-01 VITALS
RESPIRATION RATE: 14 BRPM | HEART RATE: 79 BPM | SYSTOLIC BLOOD PRESSURE: 100 MMHG | DIASTOLIC BLOOD PRESSURE: 80 MMHG | OXYGEN SATURATION: 99 % | BODY MASS INDEX: 27.09 KG/M2 | HEIGHT: 65 IN | TEMPERATURE: 97.6 F | WEIGHT: 162.6 LBS

## 2020-12-01 DIAGNOSIS — Z30.41 ENCOUNTER FOR SURVEILLANCE OF CONTRACEPTIVE PILLS: ICD-10-CM

## 2020-12-01 DIAGNOSIS — F41.1 GENERALIZED ANXIETY DISORDER: ICD-10-CM

## 2020-12-01 DIAGNOSIS — F32.1 CURRENT MODERATE EPISODE OF MAJOR DEPRESSIVE DISORDER WITHOUT PRIOR EPISODE (HCC): Primary | ICD-10-CM

## 2020-12-01 DIAGNOSIS — Z23 NEED FOR VACCINATION: ICD-10-CM

## 2020-12-01 PROCEDURE — 99213 OFFICE O/P EST LOW 20 MIN: CPT | Performed by: FAMILY MEDICINE

## 2020-12-01 PROCEDURE — 3008F BODY MASS INDEX DOCD: CPT | Performed by: FAMILY MEDICINE

## 2020-12-01 PROCEDURE — 90471 IMMUNIZATION ADMIN: CPT

## 2020-12-01 PROCEDURE — 90686 IIV4 VACC NO PRSV 0.5 ML IM: CPT

## 2020-12-01 PROCEDURE — 3725F SCREEN DEPRESSION PERFORMED: CPT | Performed by: FAMILY MEDICINE

## 2020-12-01 RX ORDER — SERTRALINE HYDROCHLORIDE 100 MG/1
100 TABLET, FILM COATED ORAL DAILY
Qty: 30 TABLET | Refills: 5 | Status: SHIPPED | OUTPATIENT
Start: 2020-12-01 | End: 2021-06-02 | Stop reason: SDUPTHER

## 2020-12-01 RX ORDER — LEVONORGESTREL AND ETHINYL ESTRADIOL 0.1-0.02MG
KIT ORAL
Qty: 28 TABLET | Refills: 0 | Status: SHIPPED | OUTPATIENT
Start: 2020-12-01 | End: 2020-12-29

## 2020-12-05 ENCOUNTER — TELEMEDICINE (OUTPATIENT)
Dept: FAMILY MEDICINE CLINIC | Facility: CLINIC | Age: 25
End: 2020-12-05
Payer: COMMERCIAL

## 2020-12-05 ENCOUNTER — TELEPHONE (OUTPATIENT)
Dept: OTHER | Facility: OTHER | Age: 25
End: 2020-12-05

## 2020-12-05 DIAGNOSIS — B34.9 VIRAL INFECTION, UNSPECIFIED: Primary | ICD-10-CM

## 2020-12-05 DIAGNOSIS — B34.9 VIRAL INFECTION, UNSPECIFIED: ICD-10-CM

## 2020-12-05 PROCEDURE — 99213 OFFICE O/P EST LOW 20 MIN: CPT | Performed by: FAMILY MEDICINE

## 2020-12-05 PROCEDURE — U0003 INFECTIOUS AGENT DETECTION BY NUCLEIC ACID (DNA OR RNA); SEVERE ACUTE RESPIRATORY SYNDROME CORONAVIRUS 2 (SARS-COV-2) (CORONAVIRUS DISEASE [COVID-19]), AMPLIFIED PROBE TECHNIQUE, MAKING USE OF HIGH THROUGHPUT TECHNOLOGIES AS DESCRIBED BY CMS-2020-01-R: HCPCS | Performed by: FAMILY MEDICINE

## 2020-12-06 LAB — SARS-COV-2 RNA SPEC QL NAA+PROBE: NOT DETECTED

## 2020-12-29 DIAGNOSIS — Z30.41 ENCOUNTER FOR SURVEILLANCE OF CONTRACEPTIVE PILLS: ICD-10-CM

## 2020-12-29 RX ORDER — LEVONORGESTREL AND ETHINYL ESTRADIOL 0.1-0.02MG
KIT ORAL
Qty: 28 TABLET | Refills: 0 | Status: SHIPPED | OUTPATIENT
Start: 2020-12-29 | End: 2021-01-26

## 2021-01-22 ENCOUNTER — TELEPHONE (OUTPATIENT)
Dept: FAMILY MEDICINE CLINIC | Facility: CLINIC | Age: 26
End: 2021-01-22

## 2021-01-22 DIAGNOSIS — Z20.822 EXPOSURE TO COVID-19 VIRUS: ICD-10-CM

## 2021-01-22 DIAGNOSIS — Z20.822 EXPOSURE TO COVID-19 VIRUS: Primary | ICD-10-CM

## 2021-01-22 LAB — SARS-COV-2 RNA RESP QL NAA+PROBE: POSITIVE

## 2021-01-22 PROCEDURE — U0005 INFEC AGEN DETEC AMPLI PROBE: HCPCS | Performed by: FAMILY MEDICINE

## 2021-01-22 PROCEDURE — U0003 INFECTIOUS AGENT DETECTION BY NUCLEIC ACID (DNA OR RNA); SEVERE ACUTE RESPIRATORY SYNDROME CORONAVIRUS 2 (SARS-COV-2) (CORONAVIRUS DISEASE [COVID-19]), AMPLIFIED PROBE TECHNIQUE, MAKING USE OF HIGH THROUGHPUT TECHNOLOGIES AS DESCRIBED BY CMS-2020-01-R: HCPCS | Performed by: FAMILY MEDICINE

## 2021-01-22 NOTE — TELEPHONE ENCOUNTER
Patient exposed to dad who is covid positive, was last with dad on Monday, dad got results on Wednesday, No mask, less then 6ft more then 15 mins, Patient does have a stuffy nose, and sore throat, please advsie

## 2021-01-25 ENCOUNTER — TELEPHONE (OUTPATIENT)
Dept: FAMILY MEDICINE CLINIC | Facility: CLINIC | Age: 26
End: 2021-01-25

## 2021-01-25 NOTE — TELEPHONE ENCOUNTER
Pt called back she got a message to schedule virtual for today covid positive but you have nothing anywhere you would like me to squeeze her in?  Thank you

## 2021-01-26 DIAGNOSIS — Z30.41 ENCOUNTER FOR SURVEILLANCE OF CONTRACEPTIVE PILLS: ICD-10-CM

## 2021-01-26 RX ORDER — LEVONORGESTREL AND ETHINYL ESTRADIOL 0.1-0.02MG
KIT ORAL
Qty: 28 TABLET | Refills: 0 | Status: SHIPPED | OUTPATIENT
Start: 2021-01-26 | End: 2021-02-23

## 2021-01-27 ENCOUNTER — TELEMEDICINE (OUTPATIENT)
Dept: FAMILY MEDICINE CLINIC | Facility: CLINIC | Age: 26
End: 2021-01-27
Payer: COMMERCIAL

## 2021-01-27 DIAGNOSIS — U07.1 COVID-19: Primary | ICD-10-CM

## 2021-01-27 PROCEDURE — 99213 OFFICE O/P EST LOW 20 MIN: CPT | Performed by: FAMILY MEDICINE

## 2021-01-27 NOTE — PROGRESS NOTES
COVID-19 Virtual Visit     Assessment/Plan:    Problem List Items Addressed This Visit        Other    COVID-19 - Primary     Day 8  Overall improving  Discussed plasma donation-pt agreeable              Disposition:     I recommended self-quarantine for 14 days and to call back for worsening symptoms or development of shortness of breath  Return to work Feb !    I have spent 10 minutes directly with the patient  Encounter provider Jennie Queen DO    Provider located at [address] [place] [id number]    Recent Visits  Date Type Provider Dept   01/25/21 Telephone [address] [facility]   01/22/21 Telephone Alicia Mendoza [facility]   Showing recent visits within past 7 days and meeting all other requirements     Today's Visits  Date Type Provider Dept   01/27/21 Telemedicine Jennie Queen DO [facility]   Showing today's visits and meeting all other requirements     Future Appointments  No visits were found meeting these conditions  Showing future appointments within next 150 days and meeting all other requirements      This virtual check-in was done via [employer] and patient was informed that this is a secure, HIPAA-compliant platform  She agrees to proceed  Patient agrees to participate in a virtual check in via telephone or video visit instead of presenting to the office to address urgent/immediate medical needs  Patient is aware this is a billable service  After connecting through [facility], the patient was identified by name and date of birth  Mariely Wetzel was informed that this was a telemedicine visit and that the exam was being conducted confidentially over secure lines  My office door was closed  No one else was in the room  Mariely Wetzel acknowledged consent and understanding of privacy and security of the telemedicine visit   I informed the patient that I have reviewed her record in Epic and presented the opportunity for her to ask any questions regarding the visit today  The patient agreed to participate  Subjective:   Maren Cabrera is a 22 y o  female who is concerned about COVID-19  Patient's symptoms include fever (100 4resolved), fatigue, nasal congestion, sore throat (improved), anosmia, loss of taste, cough, nausea, myalgias and headache  Date of symptom onset: 1/20/2021    Exposure:   Contact with a person who is under investigation (PUI) for or who is positive for COVID-19 within the last 14 days?: Yes    Hospitalized recently for fever and/or lower respiratory symptoms?: No      Currently a healthcare worker that is involved in direct patient care?: No      Works in a special setting where the risk of COVID-19 transmission may be high? (this may include long-term care, correctional and skilled nursing facilities; homeless shelters; assisted-living facilities and group homes ): No      Resident in a special setting where the risk of COVID-19 transmission may be high? (this may include long-term care, correctional and skilled nursing facilities; homeless shelters; assisted-living facilities and group homes ): No      Father was negative  Possible from work    Lab Results   Component Value Date    Arely Bucy Positive (A) 01/22/2021    Arely Bucy Not Detected 12/05/2020     Past Medical History:   Diagnosis Date    Exposure to influenza 2/1/2018     Past Surgical History:   Procedure Laterality Date    NO PAST SURGERIES       Current Outpatient Medications   Medication Sig Dispense Refill    Larissia 0 1-20 MG-MCG per tablet TAKE ONE TABLET BY MOUTH EVERY DAY 28 tablet 0    sertraline (ZOLOFT) 100 mg tablet Take 1 tablet (100 mg total) by mouth daily 30 tablet 5     No current facility-administered medications for this visit  No Known Allergies    Review of Systems   Constitutional: Positive for fatigue and fever (100 4resolved)  HENT: Positive for congestion and sore throat (improved)  Eyes: Negative  Respiratory: Positive for cough  Cardiovascular: Negative  Gastrointestinal: Positive for nausea  Endocrine: Negative  Genitourinary: Negative  Musculoskeletal: Positive for myalgias  Skin: Negative  Allergic/Immunologic: Negative  Neurological: Positive for headaches  Hematological: Negative  Psychiatric/Behavioral: Negative  Objective:    Vitals:       Physical Exam  Constitutional:       Appearance: Normal appearance  HENT:      Head: Normocephalic and atraumatic  Eyes:      Extraocular Movements: Extraocular movements intact  Pupils: Pupils are equal, round, and reactive to light  Pulmonary:      Effort: Pulmonary effort is normal  No respiratory distress  Breath sounds: No wheezing  Musculoskeletal: Normal range of motion  Neurological:      General: No focal deficit present  Mental Status: She is alert and oriented to person, place, and time  Psychiatric:         Mood and Affect: Mood normal          Behavior: Behavior normal          Thought Content: Thought content normal          Judgment: Judgment normal        VIRTUAL VISIT DISCLAIMER    aMra Carreno acknowledges that she has consented to an online visit or consultation  She understands that the online visit is based solely on information provided by her, and that, in the absence of a face-to-face physical evaluation by the physician, the diagnosis she receives is both limited and provisional in terms of accuracy and completeness  This is not intended to replace a full medical face-to-face evaluation by the physician  Mara Carreno understands and accepts these terms

## 2021-01-27 NOTE — LETTER
Dear Dr Марина Orozco is interested in participating in the [facility] COVID-19 convalescent plasma collection program  Please see my attached note verifying eligibility  Sincerely,      DO Yeison Mcgee 22 y o  female   MRN: [id number]  1995    COVID-19 Convalescent Plasma Donor Attestation Verification of Eligibility     Yeison Cobian is a 22 y o  female who was diagnosed with COVID-19 infection, has recovered from the illness and wishes to participate in the [facility] convalescent plasma donation program to treat critically ill COVID-19 patients  Donor understands that they will be screened by [address] and if deemed a suitable candidate, donation appointment time will be arranged directly through [address] [facility]  They were also informed that their plasma will be in a donor pool and cannot be directed to a specific patient  Patient tested positive for 2019 Novel Coronavirus 2019 (SARS-CoV-2 RNA) on 1/22/2021  Patient is now recovered from COVID-19 and has been or will be symptom free for at least 28 days, effective 2/19/2021  To facilitate donation, eligibility form sent directly to [address] [facility]  Patients preferred contact number: [phone]    All of the patient's questions were answered via telephone  She is aware to call our office with any further questions or concerns as needed

## 2021-02-23 DIAGNOSIS — Z30.41 ENCOUNTER FOR SURVEILLANCE OF CONTRACEPTIVE PILLS: ICD-10-CM

## 2021-02-23 RX ORDER — LEVONORGESTREL AND ETHINYL ESTRADIOL 0.1-0.02MG
KIT ORAL
Qty: 28 TABLET | Refills: 0 | Status: SHIPPED | OUTPATIENT
Start: 2021-02-23 | End: 2021-03-08 | Stop reason: SDUPTHER

## 2021-03-08 ENCOUNTER — ANNUAL EXAM (OUTPATIENT)
Dept: OBGYN CLINIC | Facility: CLINIC | Age: 26
End: 2021-03-08
Payer: COMMERCIAL

## 2021-03-08 VITALS
BODY MASS INDEX: 28.32 KG/M2 | DIASTOLIC BLOOD PRESSURE: 72 MMHG | WEIGHT: 170 LBS | SYSTOLIC BLOOD PRESSURE: 114 MMHG | HEIGHT: 65 IN

## 2021-03-08 DIAGNOSIS — Z30.41 ENCOUNTER FOR SURVEILLANCE OF CONTRACEPTIVE PILLS: ICD-10-CM

## 2021-03-08 DIAGNOSIS — Z01.419 ENCOUNTER FOR ANNUAL ROUTINE GYNECOLOGICAL EXAMINATION: Primary | ICD-10-CM

## 2021-03-08 PROCEDURE — G0145 SCR C/V CYTO,THINLAYER,RESCR: HCPCS | Performed by: OBSTETRICS & GYNECOLOGY

## 2021-03-08 PROCEDURE — 3008F BODY MASS INDEX DOCD: CPT | Performed by: FAMILY MEDICINE

## 2021-03-08 PROCEDURE — S0612 ANNUAL GYNECOLOGICAL EXAMINA: HCPCS | Performed by: OBSTETRICS & GYNECOLOGY

## 2021-03-08 RX ORDER — LEVONORGESTREL AND ETHINYL ESTRADIOL 0.1-0.02MG
1 KIT ORAL DAILY
Qty: 28 TABLET | Refills: 11 | Status: SHIPPED | OUTPATIENT
Start: 2021-03-08 | End: 2022-01-11

## 2021-03-08 NOTE — PROGRESS NOTES
Assessment/Plan:    Pap smear done as well as annual   Encouraged self-breast examination as well as calcium supplementation  Continue OCPs x1 year  Return to office in 1 year or p r n  No problem-specific Assessment & Plan notes found for this encounter  Diagnoses and all orders for this visit:    Encounter for annual routine gynecological examination  -     Liquid-based pap, screening    Encounter for surveillance of contraceptive pills  -     levonorgestrel-ethinyl estradiol Mat Villarreal) 0 1-20 MG-MCG per tablet; Take 1 tablet by mouth daily          Subjective:      Patient ID: Katiana Rojas is a 22 y o  female  HPI      this is a very pleasant 42-year-old female G0 who presents for annual gyn exam   She offers no complaints today  Her menstrual cycles are regular every 4 weeks lasting 3 days with no breakthrough bleeding  She denies any changes in bowel or bladder function  She is sexually active and has been in a monogamous relationship for over 3 years  She did receive the Gardasil vaccine at age 16  Her Pap smears have been normal     There is a family history of cervical cancer, stage I B, sister diagnosed at 29  She has been in remission for 2 years  Patient is working full-time as elementary Education, teaching third grade  She is completing her master's degree this year  The following portions of the patient's history were reviewed and updated as appropriate: allergies, current medications, past family history, past medical history, past social history, past surgical history and problem list     Review of Systems   Constitutional: Negative for fatigue, fever and unexpected weight change  Respiratory: Negative for cough, chest tightness, shortness of breath and wheezing  Cardiovascular: Negative  Negative for chest pain and palpitations  Gastrointestinal: Negative    Negative for abdominal distention, abdominal pain, blood in stool, constipation, diarrhea, nausea and vomiting  Genitourinary: Negative  Negative for difficulty urinating, dyspareunia, dysuria, flank pain, frequency, genital sores, hematuria, pelvic pain, urgency, vaginal bleeding, vaginal discharge and vaginal pain  Skin: Negative for rash  Objective:      /72   Ht 5' 5" (1 651 m)   Wt 77 1 kg (170 lb)   LMP 02/24/2021   BMI 28 29 kg/m²          Physical Exam  Constitutional:       Appearance: Normal appearance  She is well-developed  Cardiovascular:      Rate and Rhythm: Normal rate and regular rhythm  Pulmonary:      Effort: Pulmonary effort is normal       Breath sounds: Normal breath sounds  Chest:      Breasts:         Right: No inverted nipple, mass, nipple discharge, skin change or tenderness  Left: No inverted nipple, mass, nipple discharge, skin change or tenderness  Abdominal:      General: Bowel sounds are normal  There is no distension  Palpations: Abdomen is soft  Tenderness: There is no abdominal tenderness  There is no guarding or rebound  Genitourinary:     Labia:         Right: No rash, tenderness or lesion  Left: No rash, tenderness or lesion  Vagina: Normal  No signs of injury  No vaginal discharge, tenderness or lesions  Cervix: No cervical motion tenderness, discharge, friability, lesion, erythema or cervical bleeding  Uterus: Not enlarged and not tender  Adnexa:         Right: No mass, tenderness or fullness  Left: No mass, tenderness or fullness  Skin:     General: Skin is warm and dry  Neurological:      Mental Status: She is alert and oriented to person, place, and time

## 2021-03-13 LAB
LAB AP GYN PRIMARY INTERPRETATION: NORMAL
LAB AP LMP: NORMAL
Lab: NORMAL

## 2021-05-11 ENCOUNTER — TELEMEDICINE (OUTPATIENT)
Dept: FAMILY MEDICINE CLINIC | Facility: CLINIC | Age: 26
End: 2021-05-11
Payer: COMMERCIAL

## 2021-05-11 VITALS — HEIGHT: 65 IN | WEIGHT: 157 LBS | BODY MASS INDEX: 26.16 KG/M2

## 2021-05-11 DIAGNOSIS — R19.7 DIARRHEA, UNSPECIFIED TYPE: ICD-10-CM

## 2021-05-11 DIAGNOSIS — Z20.822 EXPOSURE TO COVID-19 VIRUS: Primary | ICD-10-CM

## 2021-05-11 DIAGNOSIS — Z20.822 EXPOSURE TO COVID-19 VIRUS: ICD-10-CM

## 2021-05-11 PROBLEM — U07.1 COVID-19: Status: RESOLVED | Noted: 2021-01-27 | Resolved: 2021-05-11

## 2021-05-11 PROCEDURE — U0005 INFEC AGEN DETEC AMPLI PROBE: HCPCS | Performed by: FAMILY MEDICINE

## 2021-05-11 PROCEDURE — U0003 INFECTIOUS AGENT DETECTION BY NUCLEIC ACID (DNA OR RNA); SEVERE ACUTE RESPIRATORY SYNDROME CORONAVIRUS 2 (SARS-COV-2) (CORONAVIRUS DISEASE [COVID-19]), AMPLIFIED PROBE TECHNIQUE, MAKING USE OF HIGH THROUGHPUT TECHNOLOGIES AS DESCRIBED BY CMS-2020-01-R: HCPCS | Performed by: FAMILY MEDICINE

## 2021-05-11 PROCEDURE — 99213 OFFICE O/P EST LOW 20 MIN: CPT | Performed by: FAMILY MEDICINE

## 2021-05-11 NOTE — PROGRESS NOTES
COVID-19 Outpatient Progress Note    Assessment/Plan:    Problem List Items Addressed This Visit        Other    Diarrhea     Likely not covid  Will check swab to be safe           Exposure to COVID-19 virus - Primary    Relevant Orders    Novel Coronavirus (Covid-19),PCR [facility] - Collected at   [facility] or [facility]         Disposition:     I referred patient to one of our centralized sites for a COVID-19 swab  I have spent 7 minutes directly with the patient  Encounter provider Elizabeth Crews DO    Provider located at [address] [place] [address]  [address] [place] [id number]    Recent Visits  No visits were found meeting these conditions  Showing recent visits within past 7 days and meeting all other requirements     Today's Visits  Date Type Provider Dept   05/11/21 Telemedicine DO Ruiz Delgado   Showing today's visits and meeting all other requirements     Future Appointments  No visits were found meeting these conditions  Showing future appointments within next 150 days and meeting all other requirements      This virtual check-in was done via [employer] and patient was informed that this is a secure, HIPAA-compliant platform  She agrees to proceed  Patient agrees to participate in a virtual check in via telephone or video visit instead of presenting to the office to address urgent/immediate medical needs  Patient is aware this is a billable service  After connecting through [facility], the patient was identified by name and date of birth  Josie Humphries was informed that this was a telemedicine visit and that the exam was being conducted confidentially over secure lines  My office door was closed  No one else was in the room  Josie Humphries acknowledged consent and understanding of privacy and security of the telemedicine visit   I informed the patient that I have reviewed her record in Epic and presented the opportunity for her to ask any questions regarding the visit today  The patient agreed to participate  Subjective:   Ba Chavez is a 22 y o  female who is concerned about COVID-19  Patient's symptoms include diarrhea  Patient denies fever, chills, anosmia, loss of taste, nausea, vomiting, myalgias and headaches  Date of symptom onset: 5/11/2021    Exposure:   Contact with a person who is under investigation (PUI) for or who is positive for COVID-19 within the last 14 days?: No    Hospitalized recently for fever and/or lower respiratory symptoms?: No      Currently a healthcare worker that is involved in direct patient care?: No      Works in a special setting where the risk of COVID-19 transmission may be high? (this may include long-term care, correctional and retirement facilities; homeless shelters; assisted-living facilities and group homes ): No      Resident in a special setting where the risk of COVID-19 transmission may be high? 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  Also had covid in Dec 2020  Is already feeling better already    Lab Results   Component Value Date    SARSCOV2 Positive (A) 01/22/2021    Marilin Tejeda Not Detected 12/05/2020     Past Medical History:   Diagnosis Date    COVID-19 1/27/2021    Depression     Exposure to influenza 2/1/2018     Past Surgical History:   Procedure Laterality Date    NO PAST SURGERIES       Current Outpatient Medications   Medication Sig Dispense Refill    levonorgestrel-ethinyl estradiol (Larissia) 0 1-20 MG-MCG per tablet Take 1 tablet by mouth daily 28 tablet 11    sertraline (ZOLOFT) 100 mg tablet Take 1 tablet (100 mg total) by mouth daily 30 tablet 5     No current facility-administered medications for this visit  No Known Allergies    Review of Systems   Constitutional: Negative for chills and fever  HENT: Negative      Eyes: Negative  Respiratory: Negative  Cardiovascular: Negative  Gastrointestinal: Positive for diarrhea  Negative for nausea and vomiting  Endocrine: Negative  Genitourinary: Negative  Musculoskeletal: Negative for myalgias  Neurological: Negative for headaches  Objective:    Vitals:    05/11/21 1417   Weight: 71 2 kg (157 lb)   Height: 5' 5" (1 651 m)       Physical Exam  Constitutional:       Appearance: Normal appearance  Eyes:      Extraocular Movements: Extraocular movements intact  Pupils: Pupils are equal, round, and reactive to light  Pulmonary:      Effort: Pulmonary effort is normal  No respiratory distress  Breath sounds: No wheezing  Neurological:      General: No focal deficit present  Mental Status: She is alert and oriented to person, place, and time  Psychiatric:         Mood and Affect: Mood normal          Behavior: Behavior normal          Thought Content: Thought content normal          Judgment: Judgment normal        VIRTUAL VISIT DISCLAIMER    Shira Mendez acknowledges that she has consented to an online visit or consultation  She understands that the online visit is based solely on information provided by her, and that, in the absence of a face-to-face physical evaluation by the physician, the diagnosis she receives is both limited and provisional in terms of accuracy and completeness  This is not intended to replace a full medical face-to-face evaluation by the physician  Shira Mendez understands and accepts these terms

## 2021-05-12 LAB — SARS-COV-2 RNA RESP QL NAA+PROBE: NEGATIVE

## 2021-06-02 ENCOUNTER — OFFICE VISIT (OUTPATIENT)
Dept: FAMILY MEDICINE CLINIC | Facility: CLINIC | Age: 26
End: 2021-06-02
Payer: COMMERCIAL

## 2021-06-02 VITALS
HEIGHT: 65 IN | RESPIRATION RATE: 16 BRPM | BODY MASS INDEX: 28.12 KG/M2 | DIASTOLIC BLOOD PRESSURE: 88 MMHG | SYSTOLIC BLOOD PRESSURE: 110 MMHG | TEMPERATURE: 97.7 F | OXYGEN SATURATION: 99 % | WEIGHT: 168.8 LBS | HEART RATE: 76 BPM

## 2021-06-02 DIAGNOSIS — F41.1 GENERALIZED ANXIETY DISORDER: Primary | ICD-10-CM

## 2021-06-02 DIAGNOSIS — F32.1 CURRENT MODERATE EPISODE OF MAJOR DEPRESSIVE DISORDER WITHOUT PRIOR EPISODE (HCC): ICD-10-CM

## 2021-06-02 PROBLEM — Z20.822 EXPOSURE TO COVID-19 VIRUS: Status: RESOLVED | Noted: 2021-05-11 | Resolved: 2021-06-02

## 2021-06-02 PROBLEM — R19.7 DIARRHEA: Status: RESOLVED | Noted: 2021-05-11 | Resolved: 2021-06-02

## 2021-06-02 PROCEDURE — 3725F SCREEN DEPRESSION PERFORMED: CPT | Performed by: FAMILY MEDICINE

## 2021-06-02 PROCEDURE — 99213 OFFICE O/P EST LOW 20 MIN: CPT | Performed by: FAMILY MEDICINE

## 2021-06-02 PROCEDURE — 1036F TOBACCO NON-USER: CPT | Performed by: FAMILY MEDICINE

## 2021-06-02 PROCEDURE — 3008F BODY MASS INDEX DOCD: CPT | Performed by: FAMILY MEDICINE

## 2021-06-02 RX ORDER — SERTRALINE HYDROCHLORIDE 25 MG/1
25 TABLET, FILM COATED ORAL DAILY
Qty: 14 TABLET | Refills: 0 | Status: SHIPPED | OUTPATIENT
Start: 2021-06-02 | End: 2022-01-11

## 2021-06-02 NOTE — PROGRESS NOTES
Assessment/Plan:    1  Generalized anxiety disorder  Assessment & Plan:  Wean off zoloft      2  Current moderate episode of major depressive disorder without prior episode ([facility] [address] )  Assessment & Plan:  Wean off zoloft  Cont therapy      Orders:  -     sertraline (ZOLOFT) 25 mg tablet; Take 1 tablet (25 mg total) by mouth daily for 14 days    BMI Counseling: Body mass index is 28 09 kg/m²  The BMI is above normal  Nutrition recommendations include encouraging healthy choices of fruits and vegetables  Exercise recommendations include exercising 3-5 times per week  No pharmacotherapy was ordered  There are no Patient Instructions on file for this visit  No follow-ups on file  Subjective:      Patient ID: Nohemy Escudero is a 22 y o  female  Chief Complaint   Patient presents with    Follow-up     6 month f/u       Here for follow up  Would like to wean off zoloft  Has felt it increased eating and even with exercise weight has climb  Improved anxiety but not with depression    Anxiety  Presents for follow-up visit  Patient reports no excessive worry, nervous/anxious behavior or panic  Symptoms occur rarely  Compliance with medications is %  The following portions of the patient's history were reviewed and updated as appropriate: allergies, current medications, past family history, past medical history, past social history, past surgical history and problem list     Review of Systems   Constitutional: Negative  HENT: Negative  Eyes: Negative  Respiratory: Negative  Cardiovascular: Negative  Gastrointestinal: Negative  Endocrine: Negative  Genitourinary: Negative  Musculoskeletal: Negative  Allergic/Immunologic: Negative  Neurological: Negative  Hematological: Negative  Psychiatric/Behavioral: The patient is not nervous/anxious            Current Outpatient Medications   Medication Sig Dispense Refill    levonorgestrel-ethinyl estradiol Indira Land) 0 1-20 MG-MCG per tablet Take 1 tablet by mouth daily 28 tablet 11    sertraline (ZOLOFT) 25 mg tablet Take 1 tablet (25 mg total) by mouth daily for 14 days 14 tablet 0     No current facility-administered medications for this visit  Objective:    /88   Pulse 76   Temp 97 7 °F (36 5 °C) (Tympanic)   Resp 16   Ht 5' 5" (1 651 m)   Wt 76 6 kg (168 lb 12 8 oz)   SpO2 99%   BMI 28 09 kg/m²        Physical Exam  Vitals signs and nursing note reviewed  Constitutional:       Appearance: Normal appearance  HENT:      Head: Normocephalic and atraumatic  Eyes:      Extraocular Movements: Extraocular movements intact  Pupils: Pupils are equal, round, and reactive to light  Neck:      Musculoskeletal: Normal range of motion and neck supple  Cardiovascular:      Rate and Rhythm: Normal rate and regular rhythm  Pulses: Normal pulses  Heart sounds: Normal heart sounds  Pulmonary:      Effort: Pulmonary effort is normal       Breath sounds: Normal breath sounds  Abdominal:      General: Abdomen is flat  Skin:     Capillary Refill: Capillary refill takes less than 2 seconds  Neurological:      General: No focal deficit present  Mental Status: She is alert and oriented to person, place, and time  Psychiatric:         Mood and Affect: Mood normal          Behavior: Behavior normal          Thought Content:  Thought content normal          Judgment: Judgment normal                 Ledora , DO

## 2021-06-04 DIAGNOSIS — F32.1 CURRENT MODERATE EPISODE OF MAJOR DEPRESSIVE DISORDER WITHOUT PRIOR EPISODE (HCC): ICD-10-CM

## 2021-06-04 RX ORDER — SERTRALINE HYDROCHLORIDE 100 MG/1
TABLET, FILM COATED ORAL
Qty: 30 TABLET | Refills: 5 | Status: SHIPPED | OUTPATIENT
Start: 2021-06-04 | End: 2022-01-11

## 2022-01-11 ENCOUNTER — OFFICE VISIT (OUTPATIENT)
Dept: FAMILY MEDICINE CLINIC | Facility: CLINIC | Age: 27
End: 2022-01-11
Payer: COMMERCIAL

## 2022-01-11 VITALS
OXYGEN SATURATION: 99 % | BODY MASS INDEX: 30.22 KG/M2 | HEART RATE: 96 BPM | HEIGHT: 65 IN | DIASTOLIC BLOOD PRESSURE: 80 MMHG | RESPIRATION RATE: 16 BRPM | WEIGHT: 181.4 LBS | TEMPERATURE: 97.8 F | SYSTOLIC BLOOD PRESSURE: 110 MMHG

## 2022-01-11 DIAGNOSIS — Z00.00 ANNUAL PHYSICAL EXAM: Primary | ICD-10-CM

## 2022-01-11 DIAGNOSIS — F41.1 GENERALIZED ANXIETY DISORDER: ICD-10-CM

## 2022-01-11 DIAGNOSIS — F32.1 CURRENT MODERATE EPISODE OF MAJOR DEPRESSIVE DISORDER WITHOUT PRIOR EPISODE (HCC): ICD-10-CM

## 2022-01-11 PROBLEM — Z23 NEED FOR VACCINATION: Status: RESOLVED | Noted: 2020-12-01 | Resolved: 2022-01-11

## 2022-01-11 PROCEDURE — 99395 PREV VISIT EST AGE 18-39: CPT | Performed by: FAMILY MEDICINE

## 2022-01-11 RX ORDER — ESCITALOPRAM OXALATE 10 MG/1
10 TABLET ORAL DAILY
Qty: 30 TABLET | Refills: 5 | Status: SHIPPED | OUTPATIENT
Start: 2022-01-11 | End: 2022-04-28

## 2022-01-11 NOTE — PROGRESS NOTES
[facility]    NAME: Nae Pal  AGE: 32 y o  SEX: female  : 1995     DATE: 2022     Assessment and Plan:     Problem List Items Addressed This Visit        Other    Current moderate episode of major depressive disorder without prior episode ([address] )     Start lexapro  May need to add wellbutrin  Refer to psych  Requesting genesight testing         Relevant Medications    escitalopram (Lexapro) 10 mg tablet    Other Relevant Orders    Ambulatory Referral to Psychiatry    Comprehensive metabolic panel    CBC    Generalized anxiety disorder     Start lexapro  Was on zoloft previously         Relevant Medications    escitalopram (Lexapro) 10 mg tablet    Other Relevant Orders    Ambulatory Referral to Psychiatry    TSH, 3rd generation with Free T4 reflex    Annual physical exam - Primary          Immunizations and preventive care screenings were discussed with patient today  Appropriate education was printed on patient's after visit summary  Counseling:  · Dental Health: discussed importance of regular tooth brushing, flossing, and dental visits  BMI Counseling: Body mass index is 30 33 kg/m²  The BMI is above normal  Nutrition recommendations include encouraging healthy choices of fruits and vegetables  Exercise recommendations include exercising 3-5 times per week  No pharmacotherapy was ordered  Rationale for BMI follow-up plan is due to patient being overweight or obese  Return in 1 year (on 2023)  Chief Complaint:     Chief Complaint   Patient presents with    Annual Exam     discuss antidepressant medication      History of Present Illness:     Adult Annual Physical   Patient here for a comprehensive physical exam  The patient reports problems - depression/anxiety  Diet and Physical Activity  · Diet/Nutrition: well balanced diet  · Exercise: depression causing lack of motivation        Depression Screening  PHQ-2/9 Depression Screening    Little interest or pleasure in doing things: 2 - more than half the days  Feeling down, depressed, or hopeless: 2 - more than half the days  Trouble falling or staying asleep, or sleeping too much: 3 - nearly every day  Feeling tired or having little energy: 1 - several days  Poor appetite or overeatin - more than half the days  Feeling bad about yourself - or that you are a failure or have let yourself or your family down: 2 - more than half the days  Trouble concentrating on things, such as reading the newspaper or watching television: 1 - several days  Moving or speaking so slowly that other people could have noticed  Or the opposite - being so fidgety or restless that you have been moving around a lot more than usual: 0 - not at all  Thoughts that you would be better off dead, or of hurting yourself in some way: 1 - several days  PHQ-9 Score: 14   PHQ-9 Interpretation: Moderate depression        General Health  · Sleep: sleeps poorly  · Hearing: normal - bilateral   · Vision: no vision problems  · Dental: regular dental visits  /GYN Health  · Last menstrual period: regularish? · Contraceptive method: n/a   · History of STDs?: no      Review of Systems:     Review of Systems   Constitutional: Negative  HENT: Negative  Eyes: Negative  Respiratory: Negative  Cardiovascular: Negative  Gastrointestinal: Negative  Endocrine: Negative  Genitourinary: Negative  Musculoskeletal: Negative  Allergic/Immunologic: Negative  Neurological: Negative  Hematological: Negative  Psychiatric/Behavioral: Positive for decreased concentration and sleep disturbance  The patient is nervous/anxious         Past Medical History:     Past Medical History:   Diagnosis Date    COVID-19 2021    Depression     Exposure to influenza 2018      Past Surgical History:     Past Surgical History:   Procedure Laterality Date    NO PAST SURGERIES Social History:     Social History     Socioeconomic History    Marital status: Single     Spouse name: None    Number of children: None    Years of education: None    Highest education level: None   Occupational History    Occupation:    Tobacco Use    Smoking status: Never Smoker    Smokeless tobacco: Never Used   Vaping Use    Vaping Use: Never used   Substance and Sexual Activity    Alcohol use: Yes    Drug use: No    Sexual activity: Yes     Partners: Male     Birth control/protection: OCP   Other Topics Concern    None   Social History Narrative    Drinks coffee     Social Determinants of Health     Financial Resource Strain: Not on file   Food Insecurity: Not on file   Transportation Needs: Not on file   Physical Activity: Not on file   Stress: Not on file   Social Connections: Not on file   Intimate Partner Violence: Not on file   Housing Stability: Not on file      Family History:     Family History   Problem Relation Age of Onset    No Known Problems Mother     Diabetes Father     Cervical cancer Sister     No Known Problems Sister     No Known Problems Sister     No Known Problems Sister     No Known Problems Sister     No Known Problems Brother       Current Medications:     Current Outpatient Medications   Medication Sig Dispense Refill    escitalopram (Lexapro) 10 mg tablet Take 1 tablet (10 mg total) by mouth daily 30 tablet 5     No current facility-administered medications for this visit  Allergies:     No Known Allergies   Physical Exam:     /80 (BP Location: Left arm, Patient Position: Sitting, Cuff Size: Standard)   Pulse 96   Temp 97 8 °F (36 6 °C) (Temporal)   Resp 16   Ht 5' 4 84" (1 647 m)   Wt 82 3 kg (181 lb 6 4 oz)   SpO2 99%   BMI 30 33 kg/m²     Physical Exam  Vitals and nursing note reviewed  Constitutional:       Appearance: She is well-developed  HENT:      Head: Normocephalic and atraumatic        Right Ear: External ear normal       Left Ear: External ear normal       Nose: Nose normal    Eyes:      Extraocular Movements: Extraocular movements intact  Conjunctiva/sclera: Conjunctivae normal       Pupils: Pupils are equal, round, and reactive to light  Cardiovascular:      Rate and Rhythm: Normal rate and regular rhythm  Heart sounds: Normal heart sounds  Pulmonary:      Effort: Pulmonary effort is normal       Breath sounds: Normal breath sounds  Abdominal:      General: Abdomen is flat  Bowel sounds are normal       Palpations: Abdomen is soft  Musculoskeletal:         General: Normal range of motion  Cervical back: Normal range of motion and neck supple  Skin:     General: Skin is warm and dry  Capillary Refill: Capillary refill takes less than 2 seconds  Neurological:      Mental Status: She is alert and oriented to person, place, and time  Psychiatric:         Behavior: Behavior normal          Thought Content:  Thought content normal          Judgment: Judgment normal           Kevin Giraldo DO   [address]

## 2022-01-11 NOTE — PATIENT INSTRUCTIONS

## 2022-01-24 ENCOUNTER — APPOINTMENT (OUTPATIENT)
Dept: LAB | Age: 27
End: 2022-01-24
Payer: COMMERCIAL

## 2022-01-24 DIAGNOSIS — F32.1 CURRENT MODERATE EPISODE OF MAJOR DEPRESSIVE DISORDER WITHOUT PRIOR EPISODE (HCC): ICD-10-CM

## 2022-01-24 DIAGNOSIS — F41.1 GENERALIZED ANXIETY DISORDER: ICD-10-CM

## 2022-01-24 LAB
ALBUMIN SERPL BCP-MCNC: 4.2 G/DL (ref 3.5–5)
ALP SERPL-CCNC: 62 U/L (ref 46–116)
ALT SERPL W P-5'-P-CCNC: 27 U/L (ref 12–78)
ANION GAP SERPL CALCULATED.3IONS-SCNC: 4 MMOL/L (ref 4–13)
AST SERPL W P-5'-P-CCNC: 21 U/L (ref 5–45)
BILIRUB SERPL-MCNC: 0.73 MG/DL (ref 0.2–1)
BUN SERPL-MCNC: 8 MG/DL (ref 5–25)
CALCIUM SERPL-MCNC: 8.7 MG/DL (ref 8.3–10.1)
CHLORIDE SERPL-SCNC: 105 MMOL/L (ref 100–108)
CO2 SERPL-SCNC: 28 MMOL/L (ref 21–32)
CREAT SERPL-MCNC: 0.73 MG/DL (ref 0.6–1.3)
ERYTHROCYTE [DISTWIDTH] IN BLOOD BY AUTOMATED COUNT: 12.4 % (ref 11.6–15.1)
GFR SERPL CREATININE-BSD FRML MDRD: 114 ML/MIN/1.73SQ M
GLUCOSE P FAST SERPL-MCNC: 83 MG/DL (ref 65–99)
HCT VFR BLD AUTO: 45.8 % (ref 34.8–46.1)
HGB BLD-MCNC: 15.6 G/DL (ref 11.5–15.4)
MCH RBC QN AUTO: 30.4 PG (ref 26.8–34.3)
MCHC RBC AUTO-ENTMCNC: 34.1 G/DL (ref 31.4–37.4)
MCV RBC AUTO: 89 FL (ref 82–98)
PLATELET # BLD AUTO: 383 THOUSANDS/UL (ref 149–390)
PMV BLD AUTO: 9.1 FL (ref 8.9–12.7)
POTASSIUM SERPL-SCNC: 4 MMOL/L (ref 3.5–5.3)
PROT SERPL-MCNC: 8.3 G/DL (ref 6.4–8.2)
RBC # BLD AUTO: 5.14 MILLION/UL (ref 3.81–5.12)
SODIUM SERPL-SCNC: 137 MMOL/L (ref 136–145)
TSH SERPL DL<=0.05 MIU/L-ACNC: 0.94 UIU/ML (ref 0.36–3.74)
WBC # BLD AUTO: 7.25 THOUSAND/UL (ref 4.31–10.16)

## 2022-01-24 PROCEDURE — 85027 COMPLETE CBC AUTOMATED: CPT

## 2022-01-24 PROCEDURE — 80053 COMPREHEN METABOLIC PANEL: CPT

## 2022-01-24 PROCEDURE — 36415 COLL VENOUS BLD VENIPUNCTURE: CPT

## 2022-01-24 PROCEDURE — 84443 ASSAY THYROID STIM HORMONE: CPT

## 2022-03-02 ENCOUNTER — OFFICE VISIT (OUTPATIENT)
Dept: FAMILY MEDICINE CLINIC | Facility: CLINIC | Age: 27
End: 2022-03-02
Payer: COMMERCIAL

## 2022-03-02 VITALS
SYSTOLIC BLOOD PRESSURE: 90 MMHG | WEIGHT: 181 LBS | TEMPERATURE: 97.5 F | BODY MASS INDEX: 30.16 KG/M2 | RESPIRATION RATE: 13 BRPM | HEIGHT: 65 IN | DIASTOLIC BLOOD PRESSURE: 60 MMHG | OXYGEN SATURATION: 100 % | HEART RATE: 92 BPM

## 2022-03-02 DIAGNOSIS — F32.1 CURRENT MODERATE EPISODE OF MAJOR DEPRESSIVE DISORDER WITHOUT PRIOR EPISODE (HCC): ICD-10-CM

## 2022-03-02 DIAGNOSIS — F41.1 GENERALIZED ANXIETY DISORDER: Primary | ICD-10-CM

## 2022-03-02 PROCEDURE — 1036F TOBACCO NON-USER: CPT | Performed by: FAMILY MEDICINE

## 2022-03-02 PROCEDURE — 3008F BODY MASS INDEX DOCD: CPT | Performed by: FAMILY MEDICINE

## 2022-03-02 PROCEDURE — 3725F SCREEN DEPRESSION PERFORMED: CPT | Performed by: FAMILY MEDICINE

## 2022-03-02 PROCEDURE — 99213 OFFICE O/P EST LOW 20 MIN: CPT | Performed by: FAMILY MEDICINE

## 2022-03-02 NOTE — PROGRESS NOTES
Assessment/Plan:    1  Generalized anxiety disorder  Assessment & Plan:  Stable on lexparo      2  Current moderate episode of major depressive disorder without prior episode [facility])  Assessment & Plan:  Much improved on lexapro            There are no Patient Instructions on file for this visit  Return in about 3 months (around 6/2/2022)  Subjective:      Patient ID: Conchita Doshi is a 32 y o  female  Chief Complaint   Patient presents with    Follow-up     Patient here for follow up on Anxiety        Here for follow up  Feeling much better on lexapro  More situational anxiety  No side effects  Not crying as much    Anxiety  Presents for follow-up visit  Symptoms include depressed mood (improved) and nervous/anxious behavior (situation)  Symptoms occur rarely  Compliance with medications is %  The following portions of the patient's history were reviewed and updated as appropriate: allergies, current medications, past family history, past medical history, past social history, past surgical history and problem list     Review of Systems   Constitutional: Negative  HENT: Negative  Eyes: Negative  Respiratory: Negative  Cardiovascular: Negative  Gastrointestinal: Negative  Endocrine: Negative  Genitourinary: Negative  Psychiatric/Behavioral: The patient is nervous/anxious (situation)  Current Outpatient Medications   Medication Sig Dispense Refill    escitalopram (Lexapro) 10 mg tablet Take 1 tablet (10 mg total) by mouth daily 30 tablet 5    Multiple Vitamins-Calcium (ONE-A-DAY WOMENS FORMULA PO) Take by mouth       No current facility-administered medications for this visit  Objective:    BP 90/60 (BP Location: Left arm, Patient Position: Sitting, Cuff Size: Standard)   Pulse 92   Temp 97 5 °F (36 4 °C)   Resp 13   Ht 5' 4 84" (1 647 m)   Wt 82 1 kg (181 lb)   SpO2 100%   BMI 30 27 kg/m²        Physical Exam  Vitals and nursing note reviewed  Constitutional:       Appearance: Normal appearance  Neurological:      Mental Status: She is alert  Psychiatric:         Mood and Affect: Mood normal          Behavior: Behavior normal          Thought Content:  Thought content normal          Judgment: Judgment normal                 Saravanan Rival, DO

## 2022-04-07 ENCOUNTER — OFFICE VISIT (OUTPATIENT)
Dept: PSYCHIATRY | Facility: CLINIC | Age: 27
End: 2022-04-07
Payer: COMMERCIAL

## 2022-04-07 DIAGNOSIS — F32.1 CURRENT MODERATE EPISODE OF MAJOR DEPRESSIVE DISORDER WITHOUT PRIOR EPISODE (HCC): ICD-10-CM

## 2022-04-07 DIAGNOSIS — F41.1 GENERALIZED ANXIETY DISORDER: ICD-10-CM

## 2022-04-07 PROCEDURE — 90792 PSYCH DIAG EVAL W/MED SRVCS: CPT

## 2022-04-07 NOTE — PSYCH
Outpatient Psychiatry Intake Exam       PCP: Moises Gonzalez DO    Referral source: PCP    Identifying information:  Wallace Real is a 32 y o  female with a history of depression, anxiety here for evaluation and determination of mental health management needs  Sources of information:   Information for this evaluation was gathered through direct interview with the patient  Additionally EMR was reviewed  Confidentiality discussion: Limits of confidentiality in cases of safety concerns involving self and others as well as this physician's role as a mandatory  of abuse  They voiced understanding and a desire to continue with the evaluation  SUBJECTIVE     Chief Complaint / reason for visit: " Wanting to get help for my depression and anxiety for long time now "    History of Present Illness:    Quyen Seymour is a 30-year-old single female presenting for initial evaluation with past medical history of major depression, generalized anxiety disorder  Recently initiated on Lexapro 10 mg by her PCP in January and has seen some improvement in her symptoms since this time  She is interested in further medication management for symptoms and highly interested in gene testing  Reports struggling with depression since she was a teenager but previously kept her feelings bottled up inside of her, recently initiated therapy and patient reports some improvement in her symptoms  Reports 5/10 depression with anhedonia, dysphoria, lack of energy motivation, guilt regarding being a failure in life, difficulty with sleep onset and restless during the night  Reports depression symptoms were much worse back in November 2021 before the medication was initiated  Reports passive suicidal thoughts back in November 2021 but none since the initiation of Lexapro 10 mg  Reports fluctuating 6/10 anxiety levels with symptoms of fatigue, constant worry, irritable, restless, and history of panic attacks    Reports two panic attacks since January and fluctuating anxiety levels depending on what is going on her current life  Her main stressors contributing to symptoms include her career goals and her future as a teacher  She is a  and 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 [place] for her bacilio's new job  She has struggled to adjust to teaching in [facility] [employer] and has not felt comfortable there compared to her previous job in [place]  he recently decided to go back to the [employer] and is looking forward to this  Further stressors include finances, sister recently being diagnosed with cancer, and self-doubt in terms of not feeling good enough for her family and friends  Patient is working on these negative thought processes with her therapist  She lives with her supportive nazario and reports having a supportive family and friends as well  Patient is pleasant , cooperative, goal oriented and appears optimistic for the future  Denies suicidal ideation and denies history of psychosis or barrett  No prior history of psychiatric treatment in her lifetime  Medication trials include Zoloft and Lexapro both prescribed by PCP  She reports the Zoloft made her feel numb and would not like to trial the medication any time the future  She feels the Lexapro is more effective and much less side effects but patient would like to pursue gene testing to determine the best medication for her  She was given information regarding gene testing and she is going to call customer support to determine a price range for gene testing and scheduled a return appointment next week to perform the gene testing  Patient denies history of self-harm behaviors or suicide attempts  She plans to continue following up with her private therapist on a bimonthly schedule  Denies drug or alcohol abuse      Onset of symptoms was teenager a few years ago with gradually worsening course since that time  Stressors:  Career goals, recent move to [facility] ROS:  Medication Side Effects:  Denies  Depression:  5 /10 (10 worst)  Anxiety:  6 /10 (10 worst)  Safety (SI, HI, other):  Denies SI and HI  Sleep:  About 6 hours per night, restless in middle of night  Energy:  Low  Appetite:  About 3 meals per day sometimes admits to stress eating  Weight Change:  Denies      In terms of depression, the patient endorses loss of interests/pleasure, depressed mood, change in sleep, loss of energy, thoughts of worthlessness or guilt   In terms of bipolar disorder, the patient endorses no  Symptoms include no symptoms    VANDANA symptoms: excessive worry more days than not for longer than 3 months, difficulty concentrating, fatigue, insomnia, irritable and restlessness/keyed up  Panic Disorder symptoms: palpitations/racing heart, sweating  Social Anxiety symptoms: no symptoms suggestive of social anxiety  OCD Symptoms: No significant symptoms supportive of OCD  Eating Disorder symptoms: no historical or current eating disorder  no binge eating disorder; no anorexia nervosa  no symptoms of bulimia    In terms of PTSD, the patient endorses exposure to trauma involving: denies; intrusive symptoms including (1+): no intrusive symptoms; avoidance symptoms including (1+): no avoidance symptoms; Negative alterations including (2+): no negative alteration symptoms; hyperarousal symptoms including (2+): no arousal symptoms   Symptoms have been present for greater than 6 months    In terms of psychotic symptoms, the patient reports no psychotic symptoms now or in the past     Past Psychiatric History  Previous diagnoses include depression, anxiety    Prior outpatient psychiatric treatment:  Denies    Prior therapy:  Sees a private therapist in [facility]    Prior inpatient psychiatric treatment: denies    Prior suicide attempts: denies    Prior self harm: denies    Prior violence or aggression: denies    Social History:    The patient grew up in and [place]  Childhood was described as " normal nothing that I can think of"  During childhood, parents were were supportive as much as possible  They have 5 sister(s) and 1 brother(s)  Abuse/neglect: none    As far as the patient (or present family member) is aware, overall childhood development: Patient does ascribe to normal developmental milestones such as walking, talking, potty training and making childhood friends  Education level: Bachelor's degree   Current occupation:  Works as a   Marital status:  Single  Children:  None  Current Living Situation: the patient currently lives with her firiya   Social support:  Fiance and family    Caodaism Affiliation:  Denies   experience:  Denies  Legal history:  Denies  Access to Guns:  Denies    Substance use and treatment:  Tobacco use:  Denies  Caffeine Use:  1 cup per day  ETOH use:  Very occasional  Other substance use:  Denies      Endorses previous experimentation with:  Denies    Longest clean time: not applicable  History of Inpatient/Outpatient rehabilitation program: no      Traumatic History:     Abuse: none  Other Traumatic Events: none     Family Psychiatric History:     Psychiatric Illness:  Sister - depression and anxiety disorder  Substance Abuse:  no family history of substance abuse  Suicide Attempts:  no family history of suicide attempts    Denies     Family History   Problem Relation Age of Onset    No Known Problems Mother     Diabetes Father     Cervical cancer Sister     No Known Problems Sister     No Known Problems Sister     No Known Problems Sister     No Known Problems Sister     No Known Problems Brother             Past Medical / Surgical History:    Current PCP: Luciano Constantino DO   Other providers include:     Patient Active Problem List   Diagnosis    Current moderate episode of major depressive disorder without prior episode ([facility] [address] [facility]    Generalized anxiety disorder    Annual physical exam       Past Medical History-  Past Medical History:   Diagnosis Date    COVID-19 1/27/2021    Depression     Exposure to influenza 2/1/2018        Denies     History of Seizures: no  History of Head injury-LOC-Concussion: no    Past Surgical History-  Past Surgical History:   Procedure Laterality Date    NO PAST SURGERIES            Allergies:   No Known Allergies    Recent labs:  No visits with results within 1 Month(s) from this visit  Latest known visit with results is:   Appointment on 01/24/2022   Component Date Value    Sodium 01/24/2022 137     Potassium 01/24/2022 4 0     Chloride 01/24/2022 105     CO2 01/24/2022 28     ANION GAP 01/24/2022 4     BUN 01/24/2022 8     Creatinine 01/24/2022 0 73     Glucose, Fasting 01/24/2022 83     Calcium 01/24/2022 8 7     AST 01/24/2022 21     ALT 01/24/2022 27     Alkaline Phosphatase 01/24/2022 62     Total Protein 01/24/2022 8 3*    Albumin 01/24/2022 4 2     Total Bilirubin 01/24/2022 0 73     eGFR 01/24/2022 114     WBC 01/24/2022 7 25     RBC 01/24/2022 5 14*    Hemoglobin 01/24/2022 15 6*    Hematocrit 01/24/2022 45 8     MCV 01/24/2022 89     MCH 01/24/2022 30 4     MCHC 01/24/2022 34 1     RDW 01/24/2022 12 4     Platelets 45/39/2018 383     MPV 01/24/2022 9 1     TSH 3RD GENERATON 01/24/2022 0 944      Labs were reviewed    Medical Review Of Systems:    Patient admits to depression and; otherwise Pertinent items are noted in HPI  Medications:  Current Outpatient Medications on File Prior to Visit   Medication Sig Dispense Refill    escitalopram (Lexapro) 10 mg tablet Take 1 tablet (10 mg total) by mouth daily 30 tablet 5    Multiple Vitamins-Calcium (ONE-A-DAY WOMENS FORMULA PO) Take by mouth       No current facility-administered medications on file prior to visit  Medication Compliant? yes    All current active medications have been reviewed      Objective OBJECTIVE     There were no vitals taken for this visit  MENTAL STATUS EXAM  Appearance:  age appropriate, dressed casually   Behavior:  Pleasant & cooperative   Speech:  Normal volume, regular rate and rhythm   Mood:  depressed and anxious   Affect:  mood congruent   Language: intact and appropriate for age, education, and intellect   Thought Process:  Linear and goal directed   Associations: intact associations   Thought Content:  normal and appropriate   Perceptual Disturbances: no auditory or visual hallcunations   Risk Potential / Abnormal Thoughts: Suicidal ideation - None  Homicidal ideation - None  Potential for aggression - No       Consciousness:  Alert & Awake   Sensorium:  Grossly oriented   Attention: attention span and concentration are age appropriate   Intellect: within normal limits   Fund of Knowledge:  Memory: awareness of current events: yes  recent and remote memory grossly intact   Insight:  good   Judgment: good   Muscle Strength Muscle Tone: normal  normal   Gait/Station: normal gait/station with good balance   Motor Activity: no abnormal movements     Pain none   Pain Scale 0     IMPRESSIONS/FORMULATION          Diagnoses and all orders for this visit:    Current moderate episode of major depressive disorder without prior episode ([facility] )  -     Ambulatory Referral to Psychiatry    Generalized anxiety disorder  -     Ambulatory Referral to Psychiatry        1  Current moderate episode of major depressive disorder without prior episode ([facility] )    2  Generalized anxiety disorder          Wild Eubanks is a 32 y o  female who presents with symptoms supporting the aforementioned  Suicide / Homicide / Safety risk assessment: At this time Bronson Henry is at low risk for harm of self or others  Plan:       Education about diagnosis and treatment modalities, patient voiced understanding and agreement with the following plan:    Discussed medication risks, beneftis, alternatives   Patient was informed and had time to ask questions  They agreed to treatment below    Controlled Medication Discussion:     Not applicable    Initial treatment plan:   1) MEDS:    - continue Lexapro 10 mg daily for anxiety depression, patient prefers no medication changes until gene testing is performed next week during her next visit         2) Labs:  Reviewed    3) Therapy:    - continue with her private therapist    4) Medical:    - Pt will f/u with other providers as needed    5) Other: Support as needed   - complete gene testing   - use crisis as needed      6) Follow up:   - Follow up in 1 week or sooner if needed    - Patient will call if issues or concerns     7) Treatment Plan:    - Enacted on 4/7/22 by Eleazar Wagner, due 10/7/22     Discussed self monitoring of symptoms, and symptom monitoring tools  Patient has been informed of 24 hours and weekend coverage for urgent situations accessed by calling the main clinic phone number

## 2022-04-07 NOTE — BH TREATMENT PLAN
TREATMENT PLAN (Medication Management Only)        [facility]    Name and Date of Birth:  Sofiya Veloz 32 y o  1995  Date of Treatment Plan: April 7, 2022  Diagnosis/Diagnoses:    1  Current moderate episode of major depressive disorder without prior episode ([address] )    2  Generalized anxiety disorder      Strengths/Personal Resources for Self-Care: supportive family, supportive friends  Area/Areas of need (in own words): anxiety symptoms, depressive symptoms  1  Long Term Goal: alleviate acceptable anxiety level  Target Date:6 months - 10/7/2022  Person/Persons responsible for completion of goal: Asia Arenas  2  Short Term Objective (s) - How will we reach this goal?:   A  Provider new recommended medication/dosage changes and/or continue medication(s): continue all other medications  B  Attend medication management appointments regularly  C  Attend psychotherapy regularly  Target Date:6 months - 10/7/2022  Person/Persons Responsible for Completion of Goal: Asia Arenas and henrietta langley  Progress Towards Goals: starting treatment  Treatment Modality: medication management every 4 weeks  Review due 180 days from date of this plan: 6 months - 10/7/2022  Expected length of service: maintenance  My Physician/PA/NP and I have developed this plan together and I agree to work on the goals and objectives  I understand the treatment goals that were developed for my treatment 
No lymphadedenopathy

## 2022-04-15 ENCOUNTER — OFFICE VISIT (OUTPATIENT)
Dept: PSYCHIATRY | Facility: CLINIC | Age: 27
End: 2022-04-15
Payer: COMMERCIAL

## 2022-04-15 DIAGNOSIS — F41.1 GENERALIZED ANXIETY DISORDER: ICD-10-CM

## 2022-04-15 DIAGNOSIS — F32.1 CURRENT MODERATE EPISODE OF MAJOR DEPRESSIVE DISORDER WITHOUT PRIOR EPISODE (HCC): Primary | ICD-10-CM

## 2022-04-15 PROCEDURE — 99214 OFFICE O/P EST MOD 30 MIN: CPT

## 2022-04-15 PROCEDURE — 1036F TOBACCO NON-USER: CPT

## 2022-04-15 NOTE — PSYCH
Regular Visit    Problem List Items Addressed This Visit        Other    Current moderate episode of major depressive disorder without prior episode ([address] ) - Primary    Generalized anxiety disorder             Encounter provider COREY Lazo    Provider located at   [address] [address]  [phone]    Recent Visits  No visits were found meeting these conditions  Showing recent visits within past 7 days and meeting all other requirements  Today's Visits  Date Type Provider Dept   04/15/22 Office Visit COREY Lazo [facility]   Showing today's visits and meeting all other requirements  Future Appointments  No visits were found meeting these conditions  Showing future appointments within next 150 days and meeting all other requirements       HPI     Current Outpatient Medications   Medication Sig Dispense Refill    escitalopram (Lexapro) 10 mg tablet Take 1 tablet (10 mg total) by mouth daily 30 tablet 5    Multiple Vitamins-Calcium (ONE-A-DAY WOMENS FORMULA PO) Take by mouth       No current facility-administered medications for this visit  Review of Systems        MEDICATION MANAGEMENT NOTE        [facility]    Name and Date of Birth:  Wallace Real 32 y o  1995 MRN: [id number]    Date of Visit: April 15, 2022    No Known Allergies  SUBJECTIVE:    Quyen Seymour is seen today for a follow up for Major Depressive Disorder and Generalized Anxiety Disorder  She continues to do relatively well since the last visit    Patient here to complete scheduled Gene sight testing, initial visit was 5 days ago when she 1st inquired about GeneSight testing, she has since called Gene sight and was able to ask questions and find out the potential cost of it and she would like to follow through with the testing and it was completed during today's visit  She remains on Lexapro 10 mg for the management of anxiety and depression and is looking forward to discussing the testing results next week with provider  She reports no changes to her mental health and continues to report 5/10 anxiety and depression levels which are chronic in nature  Denies thoughts of self-harm or suicidal ideation  She denies any side effects from medications  PLAN:    Awaiting Gene sight testing results  Continue Lexapro 10 mg daily for anxiety and depression    Aware of 24 hour and weekend coverage for urgent situations accessed by calling [facility] main practice number  Continue psychotherapy with therapist    Diagnoses and all orders for this visit:    Current moderate episode of major depressive disorder without prior episode ([facility] [address] )    Generalized anxiety disorder        Current Outpatient Medications on File Prior to Visit   Medication Sig Dispense Refill    escitalopram (Lexapro) 10 mg tablet Take 1 tablet (10 mg total) by mouth daily 30 tablet 5    Multiple Vitamins-Calcium (ONE-A-DAY WOMENS FORMULA PO) Take by mouth       No current facility-administered medications on file prior to visit  HPI ROS Appetite Changes and Sleep:     She reports normal sleep, adequate appetite, low energy   Denies homicidal ideation, denies suicidal ideation    Review Of Systems:      HPI ROS:               Medication Side Effects:  denies si    Depression (10 worst): 5/10    Anxiety (10 worst): 5/10    Safety concerns (SI, HI, etc): Denies si and hi    Sleep: fair    Energy: low    Appetite: fair    Weight Change: denies        Mental Status Evaluation:    Appearance Adequate hygiene and grooming   Behavior calm and cooperative   Mood anxious and depressed  Depression Scale - 5 of 10 (0 = No depression)  Anxiety Scale - 5 of 10 (0 = No anxiety)   Speech Normal rate and volume   Affect appropriate   Thought Processes Goal directed and coherent   Thought Content Does not verbalize delusional material   Associations Tightly connected   Perceptual Disturbances Denies hallucinations and does not appear to be responding to internal stimuli   Risk Potential Suicidal/Homicidal Ideation - No evidence of suicidal or homicidal ideation and patient does not verbalize suicidal or homicidal ideation  Risk of Violence - No evidence of risk for violence found on assessment  Risk of Self Mutilation - No evidence of risk for self mutilation found on assessment   Orientation oriented to person, place, time/date and situation   Memory recent and remote memory grossly intact   Consciousness alert and awake   Attention/Concentration attention span and concentration are age appropriate   Insight intact   Judgement intact   Muscle Strength and Gait normal muscle strength and normal muscle tone, normal gait/station and normal balance   Motor Activity no abnormal movements   Language no difficulty naming common objects, no difficulty repeating a phrase, no difficulty writing a sentence   Fund of Knowledge adequate knowledge of current events  adequate fund of knowledge regarding past history  adequate fund of knowledge regarding vocabulary      Past Psychiatric History Update:     Inpatient Psychiatric Admission Since Last Encounter:   no  Changes to Outpatient Psychiatric Treatment Team:    no  Suicide Attempt Or Self Mutilation Since Last Encounter:   no  Incidence of Violent Behavior Since Last Encounter:   no    Traumatic History Update:     New Onset of Abuse Since Last Encounter:   no  Traumatic Events Since Last Encounter:   no    Past Medical History:    Past Medical History:   Diagnosis Date    COVID-19 1/27/2021    Depression     Exposure to influenza 2/1/2018     No past medical history pertinent negatives    Past Surgical History:   Procedure Laterality Date    NO PAST SURGERIES       No Known Allergies  Substance Abuse History:    Social History     Substance and Sexual Activity   Alcohol Use Yes     Social History     Substance and Sexual Activity   Drug Use No     Social History:    Social History     Socioeconomic History    Marital status: Single     Spouse name: Not on file    Number of children: Not on file    Years of education: Not on file    Highest education level: Not on file   Occupational History    Occupation:    Tobacco Use    Smoking status: Never Smoker    Smokeless tobacco: Never Used   Vaping Use    Vaping Use: Never used   Substance and Sexual Activity    Alcohol use: Yes    Drug use: No    Sexual activity: Yes     Partners: Male     Birth control/protection: OCP   Other Topics Concern    Not on file   Social History Narrative    Drinks coffee     Social Determinants of Health     Financial Resource Strain: Not on file   Food Insecurity: Not on file   Transportation Needs: Not on file   Physical Activity: Not on file   Stress: Not on file   Social Connections: Not on file   Intimate Partner Violence: Not on file   Housing Stability: Not on file     Family Psychiatric History:     Family History   Problem Relation Age of Onset    No Known Problems Mother     Diabetes Father     Cervical cancer Sister     No Known Problems Sister     No Known Problems Sister     No Known Problems Sister     No Known Problems Sister     No Known Problems Brother      History Review: The following portions of the patient's history were reviewed and updated as appropriate: allergies, current medications, past family history, past medical history, past social history, past surgical history and problem list     OBJECTIVE:     Vital signs in last 24 hours: There were no vitals filed for this visit  Laboratory Results: I have personally reviewed all pertinent laboratory/tests results      Suicide/Homicide Risk Assessment:    Risk of Harm to Self:  Protective Factors: no current suicidal ideation, access to mental health treatment, compliant with medications, compliant with mental health treatment  Based on today's assessment, Pippa Moon presents the following risk of harm to self: minimal    Risk of Harm to Others:  Based on today's assessment, Pippa Moon presents the following risk of harm to others: none    The following interventions are recommended: return in 2 weeks for reassessment    Medications Risks/Benefits:      Risks, Benefits And Possible Side Effects Of Medications:    Discussed risks and benefits of treatment with patient including risk of suicidality, serotonin syndrome, increased QTc interval and SIADH related to treatment with antidepressants; Risk of induction of manic symptoms in certain patient populations     Controlled Medication Discussion:     Not applicable    Treatment Plan:    Due for update/Updated:   no  Last treatment plan done 4/7/22 by henrietta langley  Treatment Plan due on 10/7/22      COREY Lauren 04/15/22

## 2022-04-28 DIAGNOSIS — F32.1 CURRENT MODERATE EPISODE OF MAJOR DEPRESSIVE DISORDER WITHOUT PRIOR EPISODE (HCC): ICD-10-CM

## 2022-04-28 DIAGNOSIS — F41.1 GENERALIZED ANXIETY DISORDER: ICD-10-CM

## 2022-04-28 RX ORDER — ESCITALOPRAM OXALATE 10 MG/1
TABLET ORAL
Qty: 90 TABLET | Refills: 1 | Status: SHIPPED | OUTPATIENT
Start: 2022-04-28

## 2022-07-19 ENCOUNTER — HOSPITAL ENCOUNTER (EMERGENCY)
Facility: HOSPITAL | Age: 27
Discharge: HOME | End: 2022-07-19
Attending: EMERGENCY MEDICINE | Admitting: EMERGENCY MEDICINE
Payer: COMMERCIAL

## 2022-07-19 VITALS
DIASTOLIC BLOOD PRESSURE: 76 MMHG | RESPIRATION RATE: 18 BRPM | TEMPERATURE: 98 F | HEART RATE: 102 BPM | OXYGEN SATURATION: 99 % | SYSTOLIC BLOOD PRESSURE: 117 MMHG

## 2022-07-19 DIAGNOSIS — B34.9 VIRAL ILLNESS: Primary | ICD-10-CM

## 2022-07-19 DIAGNOSIS — R50.9 FEVER AND CHILLS: ICD-10-CM

## 2022-07-19 DIAGNOSIS — M79.10 MYALGIA: ICD-10-CM

## 2022-07-19 LAB
ALBUMIN SERPL BCP-MCNC: 3.7 G/DL (ref 3.5–5)
ALP SERPL-CCNC: 50 U/L (ref 34–104)
ALT SERPL W P-5'-P-CCNC: 14 U/L (ref 7–52)
ANION GAP SERPL CALCULATED.3IONS-SCNC: 6 MMOL/L (ref 4–13)
AST SERPL W P-5'-P-CCNC: 26 U/L (ref 13–39)
B BURGDOR IGG+IGM SER-ACNC: 0.7 AI
BASOPHILS # BLD AUTO: 0.04 THOUSANDS/ΜL (ref 0–0.1)
BASOPHILS NFR BLD AUTO: 1 % (ref 0–1)
BILIRUB SERPL-MCNC: 0.31 MG/DL (ref 0.2–1)
BUN SERPL-MCNC: 5 MG/DL (ref 5–25)
CALCIUM SERPL-MCNC: 8.4 MG/DL (ref 8.4–10.2)
CHLORIDE SERPL-SCNC: 105 MMOL/L (ref 96–108)
CK MB SERPL-MCNC: 1.3 % (ref 0–2.5)
CK MB SERPL-MCNC: 2.5 NG/ML (ref 0.6–6.3)
CK SERPL-CCNC: 196 U/L (ref 26–192)
CO2 SERPL-SCNC: 27 MMOL/L (ref 21–32)
CREAT SERPL-MCNC: 0.55 MG/DL (ref 0.6–1.3)
EOSINOPHIL # BLD AUTO: 0.09 THOUSAND/ΜL (ref 0–0.61)
EOSINOPHIL NFR BLD AUTO: 1 % (ref 0–6)
ERYTHROCYTE [DISTWIDTH] IN BLOOD BY AUTOMATED COUNT: 11.9 % (ref 11.6–15.1)
EXT PREG TEST URINE: NEGATIVE
EXT. CONTROL ED NAV: NORMAL
FLUAV RNA RESP QL NAA+PROBE: NEGATIVE
FLUBV RNA RESP QL NAA+PROBE: NEGATIVE
GFR SERPL CREATININE-BSD FRML MDRD: 128 ML/MIN/1.73SQ M
GLUCOSE SERPL-MCNC: 93 MG/DL (ref 65–140)
HCT VFR BLD AUTO: 44.2 % (ref 34.8–46.1)
HGB BLD-MCNC: 14.9 G/DL (ref 11.5–15.4)
IMM GRANULOCYTES # BLD AUTO: 0.03 THOUSAND/UL (ref 0–0.2)
IMM GRANULOCYTES NFR BLD AUTO: 0 % (ref 0–2)
LYMPHOCYTES # BLD AUTO: 2.07 THOUSANDS/ΜL (ref 0.6–4.47)
LYMPHOCYTES NFR BLD AUTO: 29 % (ref 14–44)
MCH RBC QN AUTO: 31.1 PG (ref 26.8–34.3)
MCHC RBC AUTO-ENTMCNC: 33.7 G/DL (ref 31.4–37.4)
MCV RBC AUTO: 92 FL (ref 82–98)
MONOCYTES # BLD AUTO: 0.62 THOUSAND/ΜL (ref 0.17–1.22)
MONOCYTES NFR BLD AUTO: 9 % (ref 4–12)
NEUTROPHILS # BLD AUTO: 4.21 THOUSANDS/ΜL (ref 1.85–7.62)
NEUTS SEG NFR BLD AUTO: 60 % (ref 43–75)
NRBC BLD AUTO-RTO: 0 /100 WBCS
PLATELET # BLD AUTO: 271 THOUSANDS/UL (ref 149–390)
PMV BLD AUTO: 8.8 FL (ref 8.9–12.7)
POTASSIUM SERPL-SCNC: 3.9 MMOL/L (ref 3.5–5.3)
PROT SERPL-MCNC: 7.1 G/DL (ref 6.4–8.4)
RBC # BLD AUTO: 4.79 MILLION/UL (ref 3.81–5.12)
RSV RNA RESP QL NAA+PROBE: NEGATIVE
SARS-COV-2 RNA RESP QL NAA+PROBE: NEGATIVE
SODIUM SERPL-SCNC: 138 MMOL/L (ref 135–147)
WBC # BLD AUTO: 7.06 THOUSAND/UL (ref 4.31–10.16)

## 2022-07-19 PROCEDURE — 81025 URINE PREGNANCY TEST: CPT | Performed by: PHYSICIAN ASSISTANT

## 2022-07-19 PROCEDURE — 82553 CREATINE MB FRACTION: CPT | Performed by: PHYSICIAN ASSISTANT

## 2022-07-19 PROCEDURE — 0241U HB NFCT DS VIR RESP RNA 4 TRGT: CPT | Performed by: PHYSICIAN ASSISTANT

## 2022-07-19 PROCEDURE — 80053 COMPREHEN METABOLIC PANEL: CPT | Performed by: PHYSICIAN ASSISTANT

## 2022-07-19 PROCEDURE — 85025 COMPLETE CBC W/AUTO DIFF WBC: CPT | Performed by: PHYSICIAN ASSISTANT

## 2022-07-19 PROCEDURE — 96374 THER/PROPH/DIAG INJ IV PUSH: CPT

## 2022-07-19 PROCEDURE — 99283 EMERGENCY DEPT VISIT LOW MDM: CPT

## 2022-07-19 PROCEDURE — 86618 LYME DISEASE ANTIBODY: CPT | Performed by: PHYSICIAN ASSISTANT

## 2022-07-19 PROCEDURE — 99284 EMERGENCY DEPT VISIT MOD MDM: CPT | Performed by: PHYSICIAN ASSISTANT

## 2022-07-19 PROCEDURE — 82550 ASSAY OF CK (CPK): CPT | Performed by: PHYSICIAN ASSISTANT

## 2022-07-19 PROCEDURE — 36415 COLL VENOUS BLD VENIPUNCTURE: CPT | Performed by: PHYSICIAN ASSISTANT

## 2022-07-19 RX ORDER — KETOROLAC TROMETHAMINE 30 MG/ML
15 INJECTION, SOLUTION INTRAMUSCULAR; INTRAVENOUS ONCE
Status: COMPLETED | OUTPATIENT
Start: 2022-07-19 | End: 2022-07-19

## 2022-07-19 RX ADMIN — KETOROLAC TROMETHAMINE 15 MG: 30 INJECTION, SOLUTION INTRAMUSCULAR at 10:35

## 2022-07-19 NOTE — Clinical Note
Renata Pryor was seen and treated in our emergency department on 7/19/2022  Diagnosis:     Verenice Landeros  may return to work on return date  She may return on this date: 07/21/2022         If you have any questions or concerns, please don't hesitate to call        Korey Son, TIMOTHY    ______________________________           _______________          _______________  Hospital Representative                              Date                                Time

## 2022-07-19 NOTE — ED NOTES
Pt aware of need for urine sample  Unable to urinate at this time  Will notify RN when able to  Call bell within reach  No distress noted  Will continue to monitor        Magalys Oquendo RN  07/19/22 1017

## 2022-07-19 NOTE — ED PROVIDER NOTES
History  Chief Complaint   Patient presents with    Medical Problem     Patient reports flu like symptoms last week, now reports generalized body pain/aching as well as fevers  Patient is a 78-year-old female presenting to the ED for evaluation of flu-like symptoms x1 week  Patient reports myalgias/arthralgias, headaches and sore throat over the past 6 days  She has also had intermittent fevers (T-max of 101° F) that improved with Tylenol/Motrin  She took a home COVID test 6 days ago and 2 days ago that were both negative  She was seen at urgent care yesterday and had a negative COVID test and negative strep test  She thinks she might have the flu but was not tested for this yesterday  She feels that her skin is hyper-sensitive to touch and that all her muscles and joints hurt  She denies any joint swelling  She is not aware of any recent tick bites but says it is possible  She denies any sick contacts or known COVID exposures  She denies any cough, chest pain, SOB, palpitations, abdominal pain, N/V/D, constipation or urinary symptoms  Prior to Admission Medications   Prescriptions Last Dose Informant Patient Reported? Taking?    Multiple Vitamins-Calcium (ONE-A-DAY WOMENS FORMULA PO) 7/18/2022 at Unknown time Self Yes Yes   Sig: Take by mouth   escitalopram (LEXAPRO) 10 mg tablet 7/18/2022 at Unknown time  No Yes   Sig: TAKE 1 TABLET BY MOUTH EVERY DAY      Facility-Administered Medications: None       Past Medical History:   Diagnosis Date    COVID-19 1/27/2021    Depression     Exposure to influenza 2/1/2018       Past Surgical History:   Procedure Laterality Date    NO PAST SURGERIES         Family History   Problem Relation Age of Onset    No Known Problems Mother     Diabetes Father     Cervical cancer Sister     No Known Problems Sister     No Known Problems Sister     No Known Problems Sister     No Known Problems Sister     No Known Problems Brother      I have reviewed and agree with the history as documented  E-Cigarette/Vaping    E-Cigarette Use Never User      E-Cigarette/Vaping Substances    Nicotine No     THC No     CBD No     Flavoring No     Other No      Social History     Tobacco Use    Smoking status: Never Smoker    Smokeless tobacco: Never Used   Vaping Use    Vaping Use: Never used   Substance Use Topics    Alcohol use: Yes    Drug use: No       Review of Systems   Constitutional: Positive for chills and fever  Negative for appetite change and fatigue  HENT: Positive for sore throat  Negative for congestion, rhinorrhea, sinus pressure and sinus pain  Eyes: Negative for photophobia and visual disturbance  Respiratory: Negative for cough, shortness of breath and wheezing  Cardiovascular: Negative for chest pain, palpitations and leg swelling  Gastrointestinal: Negative for abdominal pain, blood in stool, constipation, diarrhea, nausea and vomiting  Genitourinary: Negative for difficulty urinating, dysuria, flank pain, frequency, hematuria and urgency  Musculoskeletal: Positive for arthralgias and myalgias  Negative for back pain, joint swelling and neck pain  Neurological: Positive for headaches  Negative for dizziness, syncope, weakness and light-headedness  All other systems reviewed and are negative  Physical Exam  Physical Exam  Vitals and nursing note reviewed  Constitutional:       General: She is awake  Appearance: Normal appearance  She is well-developed  She is not toxic-appearing or diaphoretic  HENT:      Head: Normocephalic and atraumatic  Right Ear: External ear normal       Left Ear: External ear normal       Nose: Nose normal       Mouth/Throat:      Lips: Pink  Mouth: Mucous membranes are moist       Pharynx: Oropharynx is clear  Uvula midline  No oropharyngeal exudate or posterior oropharyngeal erythema  Tonsils: No tonsillar exudate        Comments: No pharyngeal erythema or tonsillar exudate  Eyes:      General: Lids are normal  No scleral icterus  Conjunctiva/sclera: Conjunctivae normal       Pupils: Pupils are equal, round, and reactive to light  Cardiovascular:      Rate and Rhythm: Normal rate and regular rhythm  Pulses: Normal pulses  Radial pulses are 2+ on the right side and 2+ on the left side  Heart sounds: Normal heart sounds, S1 normal and S2 normal    Pulmonary:      Effort: Pulmonary effort is normal  No accessory muscle usage  Breath sounds: Normal breath sounds  No stridor  No decreased breath sounds, wheezing, rhonchi or rales  Comments: Lungs clear and equal to auscultation bilaterally  No wheezing, rhonchi or rales  Abdominal:      General: Abdomen is flat  Bowel sounds are normal  There is no distension  Palpations: Abdomen is soft  Tenderness: There is no abdominal tenderness  There is no right CVA tenderness, left CVA tenderness, guarding or rebound  Musculoskeletal:      Cervical back: Full passive range of motion without pain and neck supple  No signs of trauma  No pain with movement  Right lower leg: No edema  Left lower leg: No edema  Comments: No erythema, warmth or swelling of the joints  Lymphadenopathy:      Cervical: No cervical adenopathy  Skin:     General: Skin is warm and dry  Capillary Refill: Capillary refill takes less than 2 seconds  Coloration: Skin is not cyanotic, jaundiced or pale  Comments: No rash noted  Neurological:      Mental Status: She is alert and oriented to person, place, and time  GCS: GCS eye subscore is 4  GCS verbal subscore is 5  GCS motor subscore is 6  Gait: Gait normal    Psychiatric:         Mood and Affect: Mood normal          Speech: Speech normal          Behavior: Behavior is cooperative           Vital Signs  ED Triage Vitals [07/19/22 0945]   Temperature Pulse Respirations Blood Pressure SpO2   98 °F (36 7 °C) 102 18 117/76 99 % Temp Source Heart Rate Source Patient Position - Orthostatic VS BP Location FiO2 (%)   Oral Monitor Sitting Right arm --      Pain Score       9           Vitals:    07/19/22 0945   BP: 117/76   Pulse: 102   Patient Position - Orthostatic VS: Sitting         Visual Acuity      ED Medications  Medications   ketorolac (TORADOL) injection 15 mg (15 mg Intravenous Given 7/19/22 1035)       Diagnostic Studies  Results Reviewed     Procedure Component Value Units Date/Time    CKMB [[id number]]  (Normal) Collected: 07/19/22 1009    Lab Status: Final result Specimen: Blood from Arm, Right Updated: 07/19/22 1148     CK-MB Index 1 3 %      CK-MB 2 5 ng/mL     FLU/RSV/COVID - if FLU/RSV clinically relevant [[id number]]  (Normal) Collected: 07/19/22 1009    Lab Status: Final result Specimen: Nares from Nose Updated: 07/19/22 1101     SARS-CoV-2 Negative     INFLUENZA A PCR Negative     INFLUENZA B PCR Negative     RSV PCR Negative    Narrative:      FOR PEDIATRIC PATIENTS - copy/paste COVID Guidelines URL to browser: [email or web address]  [employer]    SARS-CoV-2 assay is a Nucleic Acid Amplification assay intended for the  qualitative detection of nucleic acid from SARS-CoV-2 in nasopharyngeal  swabs  Results are for the presumptive identification of SARS-CoV-2 RNA  Positive results are indicative of infection with SARS-CoV-2, the virus  causing COVID-19, but do not rule out bacterial infection or co-infection  with other viruses  Laboratories within the [place] and its  territories are required to report all positive results to the appropriate  public health authorities  Negative results do not preclude SARS-CoV-2  infection and should not be used as the sole basis for treatment or other  patient management decisions  Negative results must be combined with  clinical observations, patient history, and epidemiological information    This test has not been FDA cleared or approved  This test has been authorized by FDA under an Emergency Use Authorization  (EUA)  This test is only authorized for the duration of time the  declaration that circumstances exist justifying the authorization of the  emergency use of an in vitro diagnostic tests for detection of SARS-CoV-2  virus and/or diagnosis of COVID-19 infection under section 564(b)(1) of  the Act, 21 U  S C  857W[place]X-2(N)(6), unless the authorization is terminated  or revoked sooner  The test has been validated but independent review by FDA  and CLIA is pending  Test performed using [employer] Gene[employer]pert: This RT-PCR assay targets N2,  a region unique to SARS-CoV-2  A conserved region in the E-gene was chosen  for pan-Sarbecovirus detection which includes SARS-CoV-2      CK [[id number]]  (Abnormal) Collected: 07/19/22 1009    Lab Status: Final result Specimen: Blood from Arm, Right Updated: 07/19/22 1044     Total  U/L     Comprehensive metabolic panel [[id number]]  (Abnormal) Collected: 07/19/22 1009    Lab Status: Final result Specimen: Blood from Arm, Right Updated: 07/19/22 1044     Sodium 138 mmol/L      Potassium 3 9 mmol/L      Chloride 105 mmol/L      CO2 27 mmol/L      ANION GAP 6 mmol/L      BUN 5 mg/dL      Creatinine 0 55 mg/dL      Glucose 93 mg/dL      Calcium 8 4 mg/dL      AST 26 U/L      ALT 14 U/L      Alkaline Phosphatase 50 U/L      Total Protein 7 1 g/dL      Albumin 3 7 g/dL      Total Bilirubin 0 31 mg/dL      eGFR 128 ml/min/1 73sq m     Narrative:      [employer] guidelines for Chronic Kidney Disease (CKD):     Stage 1 with normal or high GFR (GFR > 90 mL/min/1 73 square meters)    Stage 2 Mild CKD (GFR = 60-89 mL/min/1 73 square meters)    Stage 3A Moderate CKD (GFR = 45-59 mL/min/1 73 square meters)    Stage 3B Moderate CKD (GFR = 30-44 mL/min/1 73 square meters)    Stage 4 Severe CKD (GFR = 15-29 mL/min/1 73 square meters)    Stage 5 End Stage CKD (GFR <15 mL/min/1 73 square meters)  Note: GFR calculation is accurate only with a steady state creatinine    POCT pregnancy, urine [[id number]]  (Normal) Resulted: 07/19/22 1034    Lab Status: Final result Updated: 07/19/22 1034     EXT PREG TEST UR (Ref: Negative) NEGATIVE     Control VALID    CBC and differential [[id number]]  (Abnormal) Collected: 07/19/22 1009    Lab Status: Final result Specimen: Blood from Arm, Right Updated: 07/19/22 1024     WBC 7 06 Thousand/uL      RBC 4 79 Million/uL      Hemoglobin 14 9 g/dL      Hematocrit 44 2 %      MCV 92 fL      MCH 31 1 pg      MCHC 33 7 g/dL      RDW 11 9 %      MPV 8 8 fL      Platelets 591 Thousands/uL      nRBC 0 /100 WBCs      Neutrophils Relative 60 %      Immat GRANS % 0 %      Lymphocytes Relative 29 %      Monocytes Relative 9 %      Eosinophils Relative 1 %      Basophils Relative 1 %      Neutrophils Absolute 4 21 Thousands/µL      Immature Grans Absolute 0 03 Thousand/uL      Lymphocytes Absolute 2 07 Thousands/µL      Monocytes Absolute 0 62 Thousand/µL      Eosinophils Absolute 0 09 Thousand/µL      Basophils Absolute 0 04 Thousands/µL     Lyme Antibody Profile with reflex to [facility] [[id number]] Collected: 07/19/22 1009    Lab Status: In process Specimen: Blood from Arm, Right Updated: 07/19/22 1019                 No orders to display              Procedures  Procedures         ED Course                               SBIRT 22yo+    Flowsheet Row Most Recent Value   SBIRT (23 yo +)    In order to provide better care to our patients, we are screening all of our patients for alcohol and drug use  Would it be okay to ask you these screening questions? Unable to answer at this time Filed at: 07/19/2022 0948                    [facility]  Number of Diagnoses or Management Options  Fever and chills  Myalgia  Viral illness  Diagnosis management comments: Patient is a 20-year-old female presenting to the ED for evaluation of flu-like symptoms x1 week  Labs unremarkable  COVID/flu negative  Patient's symptoms are consistent with a viral illness  She is nontoxic appearing  Lyme test pending  Patient advised to follow-up with her PCP or return to the ED for new/worsening symptoms  Encouraged Tylenol/Motrin for body aches and fevers as well as rest and adequate hydration  The management plan was discussed in detail with the patient at bedside and all questions were answered  Strict ED return instructions were discussed at bedside  Prior to discharge, both verbal and written instructions were provided  We discussed the signs and symptoms that should prompt the patient to return to the ED  All questions were answered and the patient was comfortable with the plan of care and discharged home  The patient agrees to return to the Emergency Department for concerns and/or progression of illness  Amount and/or Complexity of Data Reviewed  Clinical lab tests: ordered and reviewed    Patient Progress  Patient progress: stable      Disposition  Final diagnoses:   Viral illness   Myalgia   Fever and chills     Time reflects when diagnosis was documented in both MDM as applicable and the Disposition within this note     Time User Action Codes Description Comment    7/19/2022 11:11 AM Shraddha Ramires [B34 9] Viral illness     7/19/2022 11:11 AM Shraddha Ramires [M79 [id number]] Myalgia     7/19/2022 11:11 AM Jass Uribe [R50 9] Fever and chills       ED Disposition     ED Disposition   Discharge    Condition   Stable    Date/Time   Tue Jul 19, 2022 11:11 AM    Comment   Jud Dakins discharge to home/self care                 Follow-up Information     Follow up With Specialties Details Why Contact Info Additional Information    Pricila Mathew DO Family Medicine Schedule an appointment as soon as possible for a visit   [address] [facility] [address] [phone]       [facility] [place] Emergency Medicine  If symptoms worsen [address] [address] [facility] [address] [id number] [facility], [address], [facility] [place], [address] [facility], [address]          Discharge Medication List as of 7/19/2022 11:12 AM      CONTINUE these medications which have NOT CHANGED    Details   escitalopram (LEXAPRO) 10 mg tablet TAKE 1 TABLET BY MOUTH EVERY DAY, Normal      Multiple Vitamins-Calcium (ONE-A-DAY WOMENS FORMULA PO) Take by mouth, Historical Med             No discharge procedures on file      PDMP Review     None          ED Provider  Electronically Signed by           Ritika Cannon PA-C  07/19/22 1212

## 2022-09-06 ENCOUNTER — OFFICE VISIT (OUTPATIENT)
Dept: FAMILY MEDICINE CLINIC | Facility: CLINIC | Age: 27
End: 2022-09-06
Payer: COMMERCIAL

## 2022-09-06 VITALS
RESPIRATION RATE: 16 BRPM | OXYGEN SATURATION: 98 % | TEMPERATURE: 96.5 F | HEIGHT: 65 IN | BODY MASS INDEX: 31.96 KG/M2 | HEART RATE: 85 BPM | SYSTOLIC BLOOD PRESSURE: 118 MMHG | DIASTOLIC BLOOD PRESSURE: 70 MMHG | WEIGHT: 191.8 LBS

## 2022-09-06 DIAGNOSIS — F41.1 GENERALIZED ANXIETY DISORDER: ICD-10-CM

## 2022-09-06 DIAGNOSIS — Z00.00 ANNUAL PHYSICAL EXAM: Primary | ICD-10-CM

## 2022-09-06 DIAGNOSIS — F32.1 CURRENT MODERATE EPISODE OF MAJOR DEPRESSIVE DISORDER WITHOUT PRIOR EPISODE (HCC): ICD-10-CM

## 2022-09-06 PROCEDURE — 99395 PREV VISIT EST AGE 18-39: CPT | Performed by: FAMILY MEDICINE

## 2022-09-06 NOTE — PATIENT INSTRUCTIONS
Wellness Visit for Adults   AMBULATORY CARE:   A wellness visit  is when you see your healthcare provider to get screened for health problems  Your healthcare provider will also give you advice on how to stay healthy  Write down your questions so you remember to ask them  Ask your healthcare provider how often you should have a wellness visit  What happens at a wellness visit:  Your healthcare provider will ask about your health, and your family history of health problems  This includes high blood pressure, heart disease, and cancer  He or she will ask if you have symptoms that concern you, if you smoke, and about your mood  You may also be asked about your intake of medicines, supplements, food, and alcohol  Any of the following may be done: Your weight  will be checked  Your height may also be checked so your body mass index (BMI) can be calculated  Your BMI shows if you are at a healthy weight  Your blood pressure  and heart rate will be checked  Your temperature may also be checked  Blood and urine tests  may be done  Blood tests may be done to check your cholesterol levels  Abnormal cholesterol levels increase your risk for heart disease and stroke  You may also need a blood or urine test to check for diabetes if you are at increased risk  Urine tests may be done to look for signs of an infection or kidney disease  A physical exam  includes checking your heartbeat and lungs with a stethoscope  Your healthcare provider may also check your skin to look for sun damage  Screening tests  may be recommended  A screening test is done to check for diseases that may not cause symptoms  The screening tests you may need depend on your age, gender, family history, and lifestyle habits  For example, colorectal screening may be recommended if you are 48years old or older  Screening tests you need if you are a woman:   A Pap smear  is used to screen for cervical cancer   Pap smears are usually done every 3 to 5 years depending on your age  You may need them more often if you have had abnormal Pap smear test results in the past  Ask your healthcare provider how often you should have a Pap smear  A mammogram  is an x-ray of your breasts to screen for breast cancer  Experts recommend mammograms every 2 years starting at age 48 years  You may need a mammogram at age 52 years or younger if you have an increased risk for breast cancer  Talk to your healthcare provider about when you should start having mammograms and how often you need them  Vaccines you may need:   Get an influenza vaccine  every year  The influenza vaccine protects you from the flu  Several types of viruses cause the flu  The viruses change over time, so new vaccines are made each year  Get a tetanus-diphtheria (Td) booster vaccine  every 10 years  This vaccine protects you against tetanus and diphtheria  Tetanus is a severe infection that may cause painful muscle spasms and lockjaw  Diphtheria is a severe bacterial infection that causes a thick covering in the back of your mouth and throat  Get a human papillomavirus (HPV) vaccine  if you are female and aged 23 to 32 or male 23 to 24 and never received it  This vaccine protects you from HPV infection  HPV is the most common infection spread by sexual contact  HPV may also cause vaginal, penile, and anal cancers  Get a pneumococcal vaccine  if you are aged 72 years or older  The pneumococcal vaccine is an injection given to protect you from pneumococcal disease  Pneumococcal disease is an infection caused by pneumococcal bacteria  The infection may cause pneumonia, meningitis, or an ear infection  Get a shingles vaccine  if you are 60 or older, even if you have had shingles before  The shingles vaccine is an injection to protect you from the varicella-zoster virus  This is the same virus that causes chickenpox   Shingles is a painful rash that develops in people who had chickenpox or have been exposed to the virus  How to eat healthy:  My Plate is a model for planning healthy meals  It shows the types and amounts of foods that should go on your plate  Fruits and vegetables make up about half of your plate, and grains and protein make up the other half  A serving of dairy is included on the side of your plate  The amount of calories and serving sizes you need depends on your age, gender, weight, and height  Examples of healthy foods are listed below:  Eat a variety of vegetables  such as dark green, red, and orange vegetables  You can also include canned vegetables low in sodium (salt) and frozen vegetables without added butter or sauces  Eat a variety of fresh fruits , canned fruit in 100% juice, frozen fruit, and dried fruit  Include whole grains  At least half of the grains you eat should be whole grains  Examples include whole-wheat bread, wheat pasta, brown rice, and whole-grain cereals such as oatmeal     Eat a variety of protein foods such as seafood (fish and shellfish), lean meat, and poultry without skin (turkey and chicken)  Examples of lean meats include pork leg, shoulder, or tenderloin, and beef round, sirloin, tenderloin, and extra lean ground beef  Other protein foods include eggs and egg substitutes, beans, peas, soy products, nuts, and seeds  Choose low-fat dairy products such as skim or 1% milk or low-fat yogurt, cheese, and cottage cheese  Limit unhealthy fats  such as butter, hard margarine, and shortening  Exercise:  Exercise at least 30 minutes per day on most days of the week  Some examples of exercise include walking, biking, dancing, and swimming  You can also fit in more physical activity by taking the stairs instead of the elevator or parking farther away from stores  Include muscle strengthening activities 2 days each week  Regular exercise provides many health benefits   It helps you manage your weight, and decreases your risk for type 2 diabetes, heart disease, stroke, and high blood pressure  Exercise can also help improve your mood  Ask your healthcare provider about the best exercise plan for you  General health and safety guidelines:   Do not smoke  Nicotine and other chemicals in cigarettes and cigars can cause lung damage  Ask your healthcare provider for information if you currently smoke and need help to quit  E-cigarettes or smokeless tobacco still contain nicotine  Talk to your healthcare provider before you use these products  Limit alcohol  A drink of alcohol is 12 ounces of beer, 5 ounces of wine, or 1½ ounces of liquor  Lose weight, if needed  Being overweight increases your risk of certain health conditions  These include heart disease, high blood pressure, type 2 diabetes, and certain types of cancer  Protect your skin  Do not sunbathe or use tanning beds  Use sunscreen with a SPF 15 or higher  Apply sunscreen at least 15 minutes before you go outside  Reapply sunscreen every 2 hours  Wear protective clothing, hats, and sunglasses when you are outside  Drive safely  Always wear your seatbelt  Make sure everyone in your car wears a seatbelt  A seatbelt can save your life if you are in an accident  Do not use your cell phone when you are driving  This could distract you and cause an accident  Pull over if you need to make a call or send a text message  Practice safe sex  Use latex condoms if are sexually active and have more than one partner  Your healthcare provider may recommend screening tests for sexually transmitted infections (STIs)  Wear helmets, lifejackets, and protective gear  Always wear a helmet when you ride a bike or motorcycle, go skiing, or play sports that could cause a head injury  Wear protective equipment when you play sports  Wear a lifejacket when you are on a boat or doing water sports      © Copyright [employer] 2022 Information is for End User's use only and may not be sold, redistributed or otherwise used for commercial purposes  All illustrations and images included in CareNotes® are the copyrighted property of [employer]  or Salvador Mathur  The above information is an  only  It is not intended as medical advice for individual conditions or treatments  Talk to your doctor, nurse or pharmacist before following any medical regimen to see if it is safe and effective for you

## 2022-09-06 NOTE — PROGRESS NOTES
[facility] PRACT[facility]    NAME: Jud Dakins  AGE: 32 y o  SEX: female  : 1995     DATE: 2022     Assessment and Plan:     Problem List Items Addressed This Visit        Other    Current moderate episode of major depressive disorder without prior episode ([address] )    Generalized anxiety disorder    Annual physical exam - Primary     Here for annual physical  UTD for PAP  Recommend a balanced diet and increased activity  Will follow up in the winter to discuss depression and anxiety  Depression has worsened a few months ago but is now coping  For now, she will continue lexapro and therapy  May return sooner as needed  Immunizations and preventive care screenings were discussed with patient today  Appropriate education was printed on patient's after visit summary  Counseling:  Exercise: the importance of regular exercise/physical activity was discussed  Recommend exercise 3-5 times per week for at least 30 minutes  No follow-ups on file  Chief Complaint:     Chief Complaint   Patient presents with    Physical Exam     Patient is here for her annual wellness      History of Present Illness:     Adult Annual Physical   Patient here for a comprehensive physical exam  The patient reports problems - anxiety, depression, and insomnia    Had genesight testing  Would eventually like to make changes to her medications  Currently on lexapro which is mildly effective  Based on the genesight results, considering Wellbutrin  Also seeing a therapist  Wants to wait til the holidays to make any adjustments  Coping for now  Diet and Physical Activity  Diet/Nutrition: poor diet  Exercise: walks 4-5 days a week      Depression Screening  PHQ-2/9 Depression Screening         General Health  Sleep: sleeps poorly but has been able to sleep this past week     Hearing: normal - bilateral   Vision: wears glasses  Dental: last visit was in a year and has an appt in October   /GYN Health  Last menstrual period: Aug 16th   Contraceptive method: Was on OCP up until Nov 2021  History of STDs?: no      Review of Systems:     Review of Systems   Past Medical History:     Past Medical History:   Diagnosis Date    COVID-19 1/27/2021    Depression     Exposure to influenza 2/1/2018      Past Surgical History:     Past Surgical History:   Procedure Laterality Date    NO PAST SURGERIES        Social History:     Social History     Socioeconomic History    Marital status: Single     Spouse name: None    Number of children: None    Years of education: None    Highest education level: None   Occupational History    Occupation:    Tobacco Use    Smoking status: Never Smoker    Smokeless tobacco: Never Used   Vaping Use    Vaping Use: Never used   Substance and Sexual Activity    Alcohol use:  Yes    Drug use: No    Sexual activity: Yes     Partners: Male     Birth control/protection: OCP   Other Topics Concern    None   Social History Narrative    Drinks coffee     Social Determinants of Health     Financial Resource Strain: Not on file   Food Insecurity: Not on file   Transportation Needs: Not on file   Physical Activity: Not on file   Stress: Not on file   Social Connections: Not on file   Intimate Partner Violence: Not on file   Housing Stability: Not on file      Family History:     Family History   Problem Relation Age of Onset    No Known Problems Mother     Diabetes Father     Cervical cancer Sister     No Known Problems Sister     No Known Problems Sister     No Known Problems Sister     No Known Problems Sister     No Known Problems Brother       Current Medications:     Current Outpatient Medications   Medication Sig Dispense Refill    escitalopram (LEXAPRO) 10 mg tablet TAKE 1 TABLET BY MOUTH EVERY DAY 90 tablet 1    Multiple Vitamins-Calcium (ONE-A-DAY WOMENS FORMULA PO) Take by mouth       No current facility-administered medications for this visit  Allergies:     No Known Allergies   Physical Exam:     /70   Pulse 85   Temp (!) 96 5 °F (35 8 °C)   Resp 16   Ht 5' 5 35" (1 66 m)   Wt 87 kg (191 lb 12 8 oz)   SpO2 98%   BMI 31 57 kg/m²     Physical Exam  Vitals and nursing note reviewed  Constitutional:       General: She is not in acute distress  Appearance: Normal appearance  She is well-developed  She is not ill-appearing or toxic-appearing  HENT:      Head: Normocephalic and atraumatic  Right Ear: Tympanic membrane normal       Left Ear: Tympanic membrane normal       Mouth/Throat:      Mouth: Mucous membranes are moist    Eyes:      Conjunctiva/sclera: Conjunctivae normal    Cardiovascular:      Rate and Rhythm: Normal rate and regular rhythm  Heart sounds: No murmur heard  Pulmonary:      Effort: Pulmonary effort is normal  No respiratory distress  Breath sounds: Normal breath sounds  Abdominal:      General: There is no distension  Palpations: Abdomen is soft  There is no mass  Tenderness: There is no abdominal tenderness  There is no guarding or rebound  Hernia: No hernia is present  Musculoskeletal:      Cervical back: Neck supple  Lymphadenopathy:      Cervical: No cervical adenopathy  Skin:     General: Skin is warm and dry  Neurological:      Mental Status: She is alert and oriented to person, place, and time     Psychiatric:         Attention and Perception: Attention normal          Mood and Affect: Mood normal          Speech: Speech normal          Behavior: Behavior normal           Sommer De Guzman MD   [address]

## 2022-09-07 NOTE — ASSESSMENT & PLAN NOTE
Here for annual physical  UTD for PAP  Recommend a balanced diet and increased activity  Will follow up in the winter to discuss depression and anxiety  Depression has worsened a few months ago but is now coping  For now, she will continue lexapro and therapy  May return sooner as needed

## 2022-09-19 ENCOUNTER — ANNUAL EXAM (OUTPATIENT)
Dept: OBGYN CLINIC | Facility: CLINIC | Age: 27
End: 2022-09-19
Payer: COMMERCIAL

## 2022-09-19 VITALS
WEIGHT: 190 LBS | DIASTOLIC BLOOD PRESSURE: 74 MMHG | BODY MASS INDEX: 31.65 KG/M2 | SYSTOLIC BLOOD PRESSURE: 118 MMHG | HEIGHT: 65 IN

## 2022-09-19 DIAGNOSIS — Z01.419 ENCOUNTER FOR ANNUAL ROUTINE GYNECOLOGICAL EXAMINATION: Primary | ICD-10-CM

## 2022-09-19 PROCEDURE — S0612 ANNUAL GYNECOLOGICAL EXAMINA: HCPCS | Performed by: OBSTETRICS & GYNECOLOGY

## 2022-09-19 PROCEDURE — G0145 SCR C/V CYTO,THINLAYER,RESCR: HCPCS | Performed by: OBSTETRICS & GYNECOLOGY

## 2022-09-19 NOTE — PROGRESS NOTES
Assessment/Plan:  Pap done as well as annual   Encouraged self-breast examination as well as calcium supplementation  Discussed various forms of contraception  She will continue to use barrier method  She will continue Lexapro per primary care  Discussed menstrual diary  Return to office in 1 year or p r n  No problem-specific Assessment & Plan notes found for this encounter  Diagnoses and all orders for this visit:    Encounter for annual routine gynecological examination          Subjective:      Patient ID: Mariano Bernheim is a 32 y o  female  HPI     This is a very pleasant 55-year-old female [de-identified] presents for her gyn exam   She discontinued her birth control pills approximately 1 year ago  Her cycles are irregular every 4-6 weeks lasting 4-5 days, heavy year than usual   She wanted to take a break from the birth control pills after 13 years  She is sexually active and has been in a monogamous relationship for 5 years, engaged  They are using condoms for contraception  Patient was living in [facility] over the last 1 year, very unhappy  She relocated locally  Her partner is still living in [facility] finishing out 1 year  Her sister was diagnosed with cervical cancer, stage I B at the age of 29  She is now 40 and unfortunately recurrence, currently undergoing chemotherapy  The following portions of the patient's history were reviewed and updated as appropriate: allergies, current medications, past family history, past medical history, past social history, past surgical history and problem list     Review of Systems   Constitutional: Negative for fatigue, fever and unexpected weight change  Respiratory: Negative for cough, chest tightness, shortness of breath and wheezing  Cardiovascular: Negative  Negative for chest pain and palpitations  Gastrointestinal: Negative  Negative for abdominal distention, abdominal pain, blood in stool, constipation, diarrhea, nausea and vomiting  Genitourinary: Negative  Negative for difficulty urinating, dyspareunia, dysuria, flank pain, frequency, genital sores, hematuria, pelvic pain, urgency, vaginal bleeding, vaginal discharge and vaginal pain  Skin: Negative for rash  Objective:      /74   Ht 5' 5 35" (1 66 m)   Wt 86 2 kg (190 lb)   LMP 08/16/2022   BMI 31 28 kg/m²          Physical Exam  Constitutional:       Appearance: Normal appearance  She is well-developed  Cardiovascular:      Rate and Rhythm: Normal rate and regular rhythm  Pulmonary:      Effort: Pulmonary effort is normal       Breath sounds: Normal breath sounds  Chest:   Breasts:      Right: No inverted nipple, mass, nipple discharge, skin change or tenderness  Left: No inverted nipple, mass, nipple discharge, skin change or tenderness  Abdominal:      General: Bowel sounds are normal  There is no distension  Palpations: Abdomen is soft  Tenderness: There is no abdominal tenderness  There is no guarding or rebound  Genitourinary:     Labia:         Right: No rash, tenderness or lesion  Left: No rash, tenderness or lesion  Vagina: Normal  No signs of injury  No vaginal discharge or tenderness  Cervix: No cervical motion tenderness, discharge, friability, lesion, erythema or cervical bleeding  Uterus: Not enlarged and not tender  Adnexa:         Right: No mass, tenderness or fullness  Left: No mass, tenderness or fullness  Neurological:      Mental Status: She is alert and oriented to person, place, and time     Psychiatric:         Behavior: Behavior normal

## 2022-09-26 ENCOUNTER — CLINICAL SUPPORT (OUTPATIENT)
Dept: FAMILY MEDICINE CLINIC | Facility: CLINIC | Age: 27
End: 2022-09-26
Payer: COMMERCIAL

## 2022-09-26 DIAGNOSIS — Z11.1 ENCOUNTER FOR PPD TEST: Primary | ICD-10-CM

## 2022-09-26 PROCEDURE — 86580 TB INTRADERMAL TEST: CPT

## 2022-09-28 ENCOUNTER — CLINICAL SUPPORT (OUTPATIENT)
Dept: FAMILY MEDICINE CLINIC | Facility: CLINIC | Age: 27
End: 2022-09-28

## 2022-09-28 DIAGNOSIS — Z11.1 ENCOUNTER FOR PPD SKIN TEST READING: Primary | ICD-10-CM

## 2022-09-28 LAB
INDURATION: 0 MM
LAB AP GYN PRIMARY INTERPRETATION: NORMAL
LAB AP LMP: NORMAL
Lab: NORMAL
TB SKIN TEST: NEGATIVE

## 2022-10-20 DIAGNOSIS — F32.1 CURRENT MODERATE EPISODE OF MAJOR DEPRESSIVE DISORDER WITHOUT PRIOR EPISODE (HCC): ICD-10-CM

## 2022-10-20 DIAGNOSIS — F41.1 GENERALIZED ANXIETY DISORDER: ICD-10-CM

## 2022-10-20 RX ORDER — ESCITALOPRAM OXALATE 10 MG/1
TABLET ORAL
Qty: 90 TABLET | Refills: 1 | Status: SHIPPED | OUTPATIENT
Start: 2022-10-20

## 2022-11-11 ENCOUNTER — RA CDI HCC (OUTPATIENT)
Dept: OTHER | Facility: HOSPITAL | Age: 27
End: 2022-11-11

## 2022-11-11 NOTE — PROGRESS NOTES
[facility]  coding opportunities       Chart reviewed, no opportunity found: CHART REVIEWED, NO OPPORTUNITY FOUND        Patients Insurance        Commercial Insurance: [address] [place] [address]

## 2023-02-23 ENCOUNTER — OFFICE VISIT (OUTPATIENT)
Dept: URGENT CARE | Age: 28
End: 2023-02-23

## 2023-02-23 VITALS
TEMPERATURE: 97.4 F | RESPIRATION RATE: 20 BRPM | OXYGEN SATURATION: 98 % | DIASTOLIC BLOOD PRESSURE: 70 MMHG | WEIGHT: 180 LBS | BODY MASS INDEX: 29.63 KG/M2 | SYSTOLIC BLOOD PRESSURE: 100 MMHG | HEART RATE: 97 BPM

## 2023-02-23 DIAGNOSIS — S40.022A CONTUSION OF LEFT UPPER EXTREMITY, INITIAL ENCOUNTER: Primary | ICD-10-CM

## 2023-02-23 DIAGNOSIS — S20.212A CONTUSION OF LEFT CHEST WALL, INITIAL ENCOUNTER: ICD-10-CM

## 2023-02-23 NOTE — PROGRESS NOTES
[facility]        NAME: Bernie Erickson is a 32 y o  female  : 1995    MRN: [id number]  DATE: 2023  TIME: 6:54 PM    Assessment and Plan   Contusion of left upper extremity, initial encounter [S40 022A]  1  Contusion of left upper extremity, initial encounter        2  Contusion of left chest wall, initial encounter              Patient Instructions     Ibuprofen or Tylenol as needed for pain  Ice to affected area as needed  Follow up with PCP in 3-5 days  Proceed to  ER if symptoms worsen  Chief Complaint     Chief Complaint   Patient presents with   • Arm Injury     Patient stated she slip in the shower hurting left arm and breast it happen   History of Present Illness       Patient presenting for evaluation of ecchymotic area on left underarm and chest wall  Patient states that 4 days ago she slipped and fell in the shower  She denies hitting her head or losing consciousness during the injury, but states that she has had severe bruising since the injury  She states that she is presenting for evaluation to rule out underlying injury  Patient denies any numbness or tingling to the area  She denies any pain other than when the area is palpated  She states that she has normal range of motion  Patient states that she has been applying ice to the area, but denies any other treatment at this time  Review of Systems   Review of Systems   Constitutional: Negative for chills and fever  Musculoskeletal: Negative for arthralgias and myalgias  Skin: Positive for color change (Ecchymosis)  Negative for wound  Neurological: Negative for weakness and numbness  All other systems reviewed and are negative          Current Medications       Current Outpatient Medications:   •  escitalopram (LEXAPRO) 10 mg tablet, TAKE 1 TABLET BY MOUTH EVERY DAY, Disp: 90 tablet, Rfl: 1  •  Multiple Vitamins-Calcium (ONE-A-DAY WOMENS FORMULA PO), Take by mouth, Disp: , Rfl: Current Allergies     Allergies as of 02/23/2023   • (No Known Allergies)            The following portions of the patient's history were reviewed and updated as appropriate: allergies, current medications, past family history, past medical history, past social history, past surgical history and problem list      Past Medical History:   Diagnosis Date   • COVID-19 1/27/2021   • Depression    • Exposure to influenza 2/1/2018       Past Surgical History:   Procedure Laterality Date   • NO PAST SURGERIES         Family History   Problem Relation Age of Onset   • No Known Problems Mother    • Diabetes Father    • Cervical cancer Sister    • No Known Problems Sister    • No Known Problems Sister    • No Known Problems Sister    • No Known Problems Sister    • No Known Problems Brother          Medications have been verified  Objective   /70 Comment: manual  Pulse 97   Temp (!) 97 4 °F (36 3 °C) (Temporal)   Resp 20   Wt 81 6 kg (180 lb)   LMP 02/23/2023 Comment: on her period  SpO2 98%   BMI 29 63 kg/m²        Physical Exam     Physical Exam  Vitals and nursing note reviewed  Constitutional:       General: She is not in acute distress  Appearance: Normal appearance  She is not ill-appearing, toxic-appearing or diaphoretic  HENT:      Head: Normocephalic and atraumatic  Eyes:      General:         Right eye: No discharge  Left eye: No discharge  Cardiovascular:      Rate and Rhythm: Normal rate  Pulses: Normal pulses  Pulmonary:      Effort: Pulmonary effort is normal    Musculoskeletal:         General: Tenderness present  No swelling  Normal range of motion  Skin:     General: Skin is warm and dry  Findings: Bruising (Large ecchymotic area to ventral aspect of patient's upper left arm, tenderness to palpation, no open area or skin breakdown, no warmth to touch) present  Neurological:      Mental Status: She is alert

## 2023-06-30 DIAGNOSIS — F41.1 GENERALIZED ANXIETY DISORDER: ICD-10-CM

## 2023-06-30 DIAGNOSIS — F32.1 CURRENT MODERATE EPISODE OF MAJOR DEPRESSIVE DISORDER WITHOUT PRIOR EPISODE (HCC): ICD-10-CM

## 2023-07-06 ENCOUNTER — TELEPHONE (OUTPATIENT)
Dept: PSYCHIATRY | Facility: CLINIC | Age: 28
End: 2023-07-06

## 2023-07-07 RX ORDER — ESCITALOPRAM OXALATE 10 MG/1
TABLET ORAL
Qty: 30 TABLET | Refills: 0 | Status: SHIPPED | OUTPATIENT
Start: 2023-07-07

## 2023-07-07 NOTE — TELEPHONE ENCOUNTER
Requested medication(s) are due for refill today: Yes  Patient has already received a courtesy refill: No  Other reason request has been forwarded to provider: per protocol.  Patient is scheduled for 8/8/23

## 2023-07-12 ENCOUNTER — TELEMEDICINE (OUTPATIENT)
Dept: BEHAVIORAL/MENTAL HEALTH CLINIC | Facility: CLINIC | Age: 28
End: 2023-07-12
Payer: COMMERCIAL

## 2023-07-12 DIAGNOSIS — F32.1 CURRENT MODERATE EPISODE OF MAJOR DEPRESSIVE DISORDER WITHOUT PRIOR EPISODE (HCC): ICD-10-CM

## 2023-07-12 DIAGNOSIS — F41.1 GENERALIZED ANXIETY DISORDER: Primary | ICD-10-CM

## 2023-07-12 PROCEDURE — 90791 PSYCH DIAGNOSTIC EVALUATION: CPT | Performed by: COUNSELOR

## 2023-07-12 NOTE — BH CRISIS PLAN
Client Name: Abel Pereira       Client YOB: 1995  : 1995    Treatment Team (include name and contact information):     Psychotherapist: Rebeka Valero    Psychiatrist: MERCED   Release of information completed: no    " NA   Release of information completed: no    Other (Specify Role):     Release of information completed: no    Other (Specify Role): NA   Release of information completed: no    Healthcare Provider  Katlyn Giraldo, [address] [address]  [phone]    Type of Plan   * Child plans (children 15 yo and younger) must be completed and signed by the child's legal guardian   * Plans for all individuals 15 yo and above must be signed by the client. Plan Type: adolescent/adult (15 and over) Initial      My Personal Strengths are (in the client's own words):  "empathy, addressing emotional needs, strong work ethic, compassionate, willingness to learn"    The stressors and triggers that may put me at risk are:  chronic anxiety, everyday stressors and stress at work    Coping skills I can use to keep myself calm and safe:  Physical activity and Call a friend or family member    Coping skills/supports I can use to maintain abstinence from substance use:   Not Applicable    The people that provide me with help and support: (Include name, contact, and how they can help)   Support person #1: Dipak Thakkar    * Phone number: in cell phone    * How can they help me? Talk to him   Support person #2:NA    * Phone number: NA    * How can they help me? NA     Support person #3: Family members    * Phone number: in cell phone    * How can they help me?  NA    In the past, the following has helped me in times of crisis:    Calling a friend and Taking a walk or exercising      If it is an emergency and you need immediate help, call     If there is a possibility of danger to yourself or others, call the following crisis hotline resources: Adult Crisis Numbers  Suicide Prevention Hotline - Dial 9-8-8  [place]: [address]: [address]: [address]: [phone]  [address] [place] [address] Street: [phone]  [facility]: [address]: [address]: [phone] (daytime). [phone] (after hours, weekends, holidays)     Child/Adolescent Crisis Numbers   [facility]: [address]: [phone]   [place] [place]: [phone]   [address] [place] [address] Street: [phone]    Please note: Some [facility] do not have a separate number for Child/Adolescent specific crisis. If your county is not listed under Child/Adolescent, please call the adult number for your county     National Talk to Text Line   All Ages - [phone]    In the event your feelings become unmanageable, and you cannot reach your support system, you will call 911 immediately or go to the nearest hospital emergency room.

## 2023-07-12 NOTE — PSYCH
Behavioral Health Psychotherapy Assessment    Date of Initial Psychotherapy Assessment: 07/12/23  Referral Source: school employment  Has a release of information been signed for the referral source? Yes    Preferred Name: Adrian Eubanks  Preferred Pronouns: She/her  YOB: 1995 Age: 32 y.o. Sex assigned at birth: female   Gender Identity: female  Race:   Preferred Language: English    Emergency Contact:  Full Name: Sukhi Du  Relationship to Client: nazario  Contact information: [phone]    Primary Care Physician:  Juana Bryant, [address] [facility] [address]  [facility] [address]  [phone]  Has a release of information been signed? no    Physical Health History:  Past surgical procedures: no  Do you have a history of any of the following: other NA  Do you have any mobility issues? No    Relevant Family History:  Hilary Paris has 6 brothers and sisters that have anxiety and depression. Presenting Problem (What brings you in?)  Hilary Paris has had several employment transitions in the past few years. She has been a teacher for 4 years, starting in [employer] and had a good experience at [employer]. She then moved to [place] and started teaching there but left after a year of feeling isolated and not supported. She subbed for a short time and then got a position as a  in [employer]. She has struggled with anxiety and depression since age 15 and struggles when students are coming in with so many mental health concerns. She feels that her hopelessness gets worse in the summer with nothing to do and the drastic transition from school to summer. That said, she wonders if the busyness allows her to avoid her feelings and in the summer she has to deal with them directly.   Her fiance has not been with her this past year as they have been living apart (her in [facility] [place] and he in [place]) but they will be living together in August.  She does not have weapons in the home and has never wanted to hurt herself. That said, monthly she feels hopelessness and loss of interest in life. Talking with her fiance, going outside, and reminding herself that this will pass is helpful. She would like to start individual counseling and has asked for information. She would also like to change medication as she feels that Lexipro is not fully helping. Mental Health Advance Directive:  Do you currently have a Mental Health Advance Directive? no    Diagnosis:   Diagnosis ICD-10-CM Associated Orders   1. Generalized anxiety disorder  F41.1       2. Current moderate episode of major depressive disorder without prior episode ([facility])  F32.1           Initial Assessment:     Current Mental Status:    Appearance: appropriate and casual      Behavior/Manner: cooperative      Affect/Mood:  Good    Speech:  Normal and articulate    Sleep:  Interrupted    Oriented to: oriented to self, oriented to place and oriented to time       Clinical Symptoms    Depression: yes      Anxiety: yes      Depression Symptoms: depressed mood, restlessness, serious loss of interest in things, thoughts that death would be easier, suicidal ideation, social isolation, fatigue, indecision, poor concentration, weight gain, sleep disturbance, hypersomnia, insomnia, decreased libido and irritable      Anxiety Symptoms: excessive worry, fatigues easily, irritable, diaphoresis, nervous/anxious, difficulty controlling worry, restlessness and hot flashes      Have you ever been assaultive to others or the environment: No      Have you ever been self-injurious: No      Counseling History:  Previous Counseling or Treatment  (Mental Health or Drug & Alcohol): Yes    Previous Counseling Details: For 2 years, February 2020-around February 2022, she found it helpful but the counselor closed the practice.   Have you previously taken psychiatric medications: Yes    Previous Medications Attempted:  Zoloft previously and lexipro now    Suicide Risk Assessment  Have you ever had a suicide attempt: No    Have you had incidents of suicidal ideation: Yes    Are you currently experiencing suicidal thoughts: No    Additional Suicide Risk Information:  Just a thought of hopelessness that I don't want to be here. She will tell her fiance and they will talk through it together. She has experienced those feelings in the past month. They will last 30 minutes to an hour but the state of hopelessness could last a day or more. She has noticed that in the past year lost of interest in things. ..feels that she might need a new medication. She forces herself to go for a walk but does not enjoy it. She feels like it is worse in the summer because there is a change in the routine and she is less busy.     Substance Abuse/Addiction Assessment:  Alcohol: No    Heroin: No    Fentanyl: No    Opiates: No    Cocaine: No    Amphetamines: No    Hallucinogens: No    Club Drugs: No    Benzodiazepines: No    Other Rx Meds: No    Marijuana: No    Tobacco/Nicotine: No    Have you experienced blackouts as a result of substance use: No    Have you had any periods of abstinence: No    Have you experienced symptoms of withdrawal: No    Have you ever overdosed on any substances?: No    Are you currently using any Medication Assisted Treatment for Substance Use: No      Compulsive Behaviors:  Compulsive Behavior Information:  Na    Disordered Eating History:  Do you have a history of disordered eating: No      Social Determinants of Health:    SDOH:  Stress, financial instability and unemployment/underemployment    Trauma and Abuse History:    Have you ever been abused: No      Legal History:    Have you ever been arrested  or had a DUI: No      Have you been incarcerated: No      Are you currently on parole/probation: No      Any current Children and Youth involvement: No      Any pending legal charges: No      Relationship History:    Current marital status: single      Natural Supports: Mother, father, siblings, friends and other    Other natural supports:  Fiance    Employment History    Are you currently employed: Yes      Longest period of employment:  5072-0084    Employer/ Job title:  Teacher    Future work goals:  Principal internship    Sources of income/financial support:  Work     History:      Status: no history of [address]  Educational History:     Have you ever been diagnosed with a learning disability: No      Highest level of education:  Master's Degree    School attended/attending:  [employer]    Have you ever had an IEP or 504-plan: No      Do you need assistance with reading or writing: No      Recommended Treatment:     Psychotherapy:  Group therapy sessions    Frequency:  1 time    Session frequency:  Weekly      Visit start and stop times:    07/12/23  Start Time: 0800  Stop Time: 0900  Total Visit Time: 60 minutes

## 2023-07-12 NOTE — BH TREATMENT PLAN
Outpatient Behavioral Health Psychotherapy Treatment Plan    Delilah Rangel  1995     Date of Initial Psychotherapy Assessment: 07/12/23  Date of Current Treatment Plan: 07/12/23  Treatment Plan Target Date: 08/21/23  Treatment Plan Expiration Date: 01/12/24    Diagnosis:   1. Generalized anxiety disorder        2.  Current moderate episode of major depressive disorder without prior episode ([address])            Area(s) of Need: Identifying feelings and building coping and resiliency skills related to the main trigger of work    Long Term Goal 1 (in the client's own words): Osvaldo Wiggins will regulate her emotions    Stage of Change: Contemplation    Target Date for completion: 08/21/23     Anticipated therapeutic modalities: CBD/Group Therapy     People identified to complete this goal: Osvaldo Wiggins therapist      Objective 1: (identify the means of measuring success in meeting the objective): Osvaldo Wiggins will learn to identify and talk about her feelings with peers      Objective 2: (identify the means of measuring success in meeting the objective): Osvaldo Wiggins will learn to coping and resiliency skills within a collaborative support group      Long Term Goal 2 (in the client's own words): NA    Stage of Change: Pre-contemplation    Target Date for completion: NA     Anticipated therapeutic modalities: NA     People identified to complete this goal: NA      Objective 1: (identify the means of measuring success in meeting the objective): NA      Objective 2: (identify the means of measuring success in meeting the objective): NA     Long Term Goal 3 (in the client's own words): NA    Stage of Change: Pre-contemplation    Target Date for completion: NA     Anticipated therapeutic modalities: NA     People identified to complete this goal: NA      Objective 1: (identify the means of measuring success in meeting the objective): NA      Objective 2: (identify the means of measuring success in meeting the objective): NA     I am currently under the care of a [facility] psychiatric provider: yes    My [facility] psychiatric provider is: Primary Care Physician    I am currently taking psychiatric medications: Yes, as prescribed    I feel that I will be ready for discharge from mental health care when I reach the following (measurable goal/objective): When the group in concluded, emotions and coping skills are identified through peer connection, education, and allowed time to process. For children and adults who have a legal guardian:   Has there been any change to custody orders and/or guardianship status? NA. If yes, attach updated documentation. I have created my Crisis Plan and have been offered a copy of this plan    [address]: Diagnosis and Treatment Plan explained to [place] Bec[place] acknowledges an understanding of their diagnosis. Km Darling agrees to this treatment plan.     I have been offered a copy of this Treatment Plan. yes

## 2023-07-27 DIAGNOSIS — F32.1 CURRENT MODERATE EPISODE OF MAJOR DEPRESSIVE DISORDER WITHOUT PRIOR EPISODE (HCC): ICD-10-CM

## 2023-07-27 DIAGNOSIS — F41.1 GENERALIZED ANXIETY DISORDER: ICD-10-CM

## 2023-07-27 RX ORDER — ESCITALOPRAM OXALATE 10 MG/1
TABLET ORAL
Qty: 30 TABLET | Refills: 0 | Status: SHIPPED | OUTPATIENT
Start: 2023-07-27

## 2023-08-14 ENCOUNTER — OFFICE VISIT (OUTPATIENT)
Dept: BEHAVIORAL/MENTAL HEALTH CLINIC | Facility: CLINIC | Age: 28
End: 2023-08-14
Payer: COMMERCIAL

## 2023-08-14 DIAGNOSIS — F43.20 ADJUSTMENT DISORDER, UNSPECIFIED TYPE: Primary | ICD-10-CM

## 2023-08-14 PROCEDURE — 90853 GROUP PSYCHOTHERAPY: CPT | Performed by: COUNSELOR

## 2023-08-14 NOTE — PSYCH
Behavioral Health Psychotherapy Group Progress Note    Psychotherapy Provided: Group Therapy    1. Adjustment disorder, unspecified type            Goals addressed in session: Goal 1     Group Name: Trauma Support Group for Teachers    Topic(s) covered: Secondary Trauma in the classroom    Skill(s) covered: creating supportive relationships and coping skills    Group summary: This group included 4 teachers, one participant for the first time. The group started with a check in, asking the participants to go around and talk about something that they engaged in that was relaxing over the weekend. A quote was read from the book "the body keeps score" relating to making yourself a priority before taking care of others. 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 Some talked about using plants, using furniture, and re-designing their classrooms to be calming. They talked about their fear that when they try and give breaks to themselves and the kids, they will be judged harshly by administration. Some cared about this and some wished that they did not care. They were given some education on brain waves and what activities elicit the brainwaves we want. There was a discussion about the importance of movement, writing, talking and making the brain body connection. For the next week, there is brainstorming about how to provide support in the school year. Data: Michael Bales attended today's group. She expressed during her check in that she and her fiance are finally in the same location as they have been living an hour apart for the past year while [facility] [employer] [employer] and he worked in [place]. She smiled widely saying that there is so much work to do but at least they are together.   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 She expressed that she does not know how to help herself and she struggles to find places to set boundaries without being afraid of administration repercussions. 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 She said she got to the point in which she did not care anymore and started to do what she thought her kids needed rather than the strict schedule that has been dictated down. Substance Abuse was not addressed during this session. If the client is diagnosed with a co-occurring substance use disorder, please indicate any changes in the frequency or amount of use: NA. Stage of change for addressing substance use diagnoses: No substance use/Not applicable    ASSESSMENT:  Raiza appeared  with a Euthymic/ normal mood. Her affect is Normal range and intensity, which is congruent, with his mood and the content of the session. She appeared to actively participated in the group and interacted appropriately with and was supportive of the other group members. Ronnie Orellana presents with a nonerisk of suicide,nonerisk of self-harm, and none risk of harm to others. For any risk assessment that surpasses a "low" rating, a safety plan must be developed. A safety plan was indicated: no  If yes, describe in detail NA    PLAN: Lizandro Jacobs will explore ways to set boundaries and self care. The next group is scheduled for 1 week and will cover the topic of yoga and self-care. Behavioral Health Treatment Plan and Discharge Planning: Ronnie Orellana is aware of and agrees to continue to work on their treatment plan. They have identified and are working toward their discharge goals.  yes    Visit start and stop times:    08/15/23  Start Time: 0900  Stop Time: 1000  Total Visit Time: 60 minutes

## 2023-08-22 ENCOUNTER — OFFICE VISIT (OUTPATIENT)
Dept: FAMILY MEDICINE CLINIC | Facility: CLINIC | Age: 28
End: 2023-08-22
Payer: COMMERCIAL

## 2023-08-22 VITALS
OXYGEN SATURATION: 98 % | SYSTOLIC BLOOD PRESSURE: 110 MMHG | HEART RATE: 94 BPM | BODY MASS INDEX: 33.19 KG/M2 | RESPIRATION RATE: 14 BRPM | WEIGHT: 199.2 LBS | DIASTOLIC BLOOD PRESSURE: 80 MMHG | TEMPERATURE: 97.7 F | HEIGHT: 65 IN

## 2023-08-22 DIAGNOSIS — F32.1 CURRENT MODERATE EPISODE OF MAJOR DEPRESSIVE DISORDER WITHOUT PRIOR EPISODE (HCC): Primary | ICD-10-CM

## 2023-08-22 DIAGNOSIS — F41.1 GENERALIZED ANXIETY DISORDER: ICD-10-CM

## 2023-08-22 PROCEDURE — 99213 OFFICE O/P EST LOW 20 MIN: CPT | Performed by: FAMILY MEDICINE

## 2023-08-22 RX ORDER — BUPROPION HYDROCHLORIDE 150 MG/1
150 TABLET ORAL EVERY MORNING
Qty: 30 TABLET | Refills: 5 | Status: SHIPPED | OUTPATIENT
Start: 2023-08-22 | End: 2024-02-18

## 2023-08-22 NOTE — PROGRESS NOTES
Assessment/Plan:    1. Current moderate episode of major depressive disorder without prior episode [facility])  Assessment & Plan: Add wellbutrin  Cont lexapro for now    Orders:  -     buPROPion (WELLBUTRIN XL) 150 mg 24 hr tablet; Take 1 tablet (150 mg total) by mouth every morning  -     Ambulatory Referral to Psychiatry; Future    2. Generalized anxiety disorder  Assessment & Plan:  Cont lexapro for now    Orders:  -     buPROPion (WELLBUTRIN XL) 150 mg 24 hr tablet; Take 1 tablet (150 mg total) by mouth every morning  -     Ambulatory Referral to Psychiatry; Future      BMI Counseling: Body mass index is 32.79 kg/m². The BMI is above normal. Nutrition recommendations include encouraging healthy choices of fruits and vegetables. Exercise recommendations include exercising 3-5 times per week. No pharmacotherapy was ordered. Rationale for BMI follow-up plan is due to patient being overweight or obese. There are no Patient Instructions on file for this visit. Return in about 6 weeks (around 10/3/2023). Subjective:      Patient ID: Debbie Velazquez is a 29 y.o. female. Chief Complaint   Patient presents with   • Anxiety     Patient here with Anxiety        Here for med review  Anxiety feeling better  Therapist closed practice  genesights reviewed  Lacking motivation        Anxiety  Presents for follow-up visit. Symptoms include depressed mood. Patient reports no nervous/anxious behavior. Symptoms occur constantly. Compliance with medications is %. The following portions of the patient's history were reviewed and updated as appropriate: allergies, current medications, past family history, past medical history, past social history, past surgical history and problem list.    Review of Systems   Constitutional: Negative. HENT: Negative. Eyes: Negative. Respiratory: Negative. Cardiovascular: Negative. Gastrointestinal: Negative. Endocrine: Negative. Genitourinary: Negative. Musculoskeletal: Negative. Allergic/Immunologic: Negative. Neurological: Negative. Hematological: Negative. Psychiatric/Behavioral: The patient is not nervous/anxious. Current Outpatient Medications   Medication Sig Dispense Refill   • Biotin 10 MG CHEW Chew     • buPROPion (WELLBUTRIN XL) 150 mg 24 hr tablet Take 1 tablet (150 mg total) by mouth every morning 30 tablet 5   • escitalopram (LEXAPRO) 10 mg tablet TAKE ONE TABLET BY MOUTH EVERY DAY 30 tablet 0   • Multiple Vitamins-Calcium (ONE-A-DAY WOMENS FORMULA PO) Take by mouth       No current facility-administered medications for this visit. Objective:    /80 (BP Location: Left arm, Patient Position: Sitting, Cuff Size: Standard)   Pulse 94   Temp 97.7 °F (36.5 °C) (Tympanic)   Resp 14   Ht 5' 5.35" (1.66 m)   Wt 90.4 kg (199 lb 3.2 oz)   SpO2 98%   BMI 32.79 kg/m²        Physical Exam  Vitals and nursing note reviewed. Constitutional:       Appearance: Normal appearance. She is well-developed. HENT:      Head: Normocephalic and atraumatic. Nose: Nose normal.   Eyes:      General: Lids are normal.      Conjunctiva/sclera: Conjunctivae normal.      Pupils: Pupils are equal, round, and reactive to light. Cardiovascular:      Rate and Rhythm: Normal rate and regular rhythm. Heart sounds: Normal heart sounds, S1 normal and S2 normal.   Pulmonary:      Effort: Pulmonary effort is normal.      Breath sounds: Normal breath sounds. Abdominal:      General: Bowel sounds are normal.      Palpations: Abdomen is soft. Musculoskeletal:         General: Normal range of motion. Cervical back: Normal range of motion and neck supple. Skin:     General: Skin is warm and dry. Neurological:      General: No focal deficit present. Mental Status: She is alert and oriented to person, place, and time. Deep Tendon Reflexes: Reflexes are normal and symmetric.    Psychiatric:         Mood and Affect: Mood normal.         Speech: Speech normal.         Behavior: Behavior normal.         Thought Content:  Thought content normal.         Judgment: Judgment normal.                Charan Weeks,

## 2023-08-24 ENCOUNTER — TELEPHONE (OUTPATIENT)
Dept: PSYCHIATRY | Facility: CLINIC | Age: 28
End: 2023-08-24

## 2023-08-24 ENCOUNTER — DOCUMENTATION (OUTPATIENT)
Dept: BEHAVIORAL/MENTAL HEALTH CLINIC | Facility: CLINIC | Age: 28
End: 2023-08-24

## 2023-08-24 DIAGNOSIS — F41.1 GENERALIZED ANXIETY DISORDER: Primary | ICD-10-CM

## 2023-08-24 DIAGNOSIS — F32.1 CURRENT MODERATE EPISODE OF MAJOR DEPRESSIVE DISORDER WITHOUT PRIOR EPISODE (HCC): ICD-10-CM

## 2023-08-24 NOTE — PROGRESS NOTES
Psychotherapy Discharge Summary    Preferred Name: Amanda Tang  YOB: 1995    Admission date to psychotherapy: 7/12/2023    Referred by: self    Presenting Problem: anxiety and depression    Course of treatment included : group counseling    Progress/Outcome of Treatment Goals (brief summary of course of treatment) determining feelings, coping skills, and resiliency related to triggers at employment    Treatment Complications (if any): summer trauma group had limited summer sessions    Treatment Progress: good    Current [facility] Psychiatric Provider: Ivis Garcia    Discharge Medications include: continued with current prescriptions    Discharge Date: 8/21/2023    Discharge Diagnosis:   1. Generalized anxiety disorder        2.  Current moderate episode of major depressive disorder without prior episode [facility])            Criteria for Discharge: Summer group concluded with limited session only in the summer, in [facility] only attended one session    Aftercare recommendations include (include specific referral names and phone numbers, if appropriate): individual therapy    Prognosis: unknown due to lack of attendance

## 2023-08-29 DIAGNOSIS — F41.1 GENERALIZED ANXIETY DISORDER: ICD-10-CM

## 2023-08-29 DIAGNOSIS — F32.1 CURRENT MODERATE EPISODE OF MAJOR DEPRESSIVE DISORDER WITHOUT PRIOR EPISODE (HCC): ICD-10-CM

## 2023-08-29 RX ORDER — ESCITALOPRAM OXALATE 10 MG/1
TABLET ORAL
Qty: 30 TABLET | Refills: 0 | Status: SHIPPED | OUTPATIENT
Start: 2023-08-29

## 2023-08-31 ENCOUNTER — TELEPHONE (OUTPATIENT)
Dept: PSYCHIATRY | Facility: CLINIC | Age: 28
End: 2023-08-31

## 2023-09-20 ENCOUNTER — SOCIAL WORK (OUTPATIENT)
Dept: BEHAVIORAL/MENTAL HEALTH CLINIC | Facility: CLINIC | Age: 28
End: 2023-09-20
Payer: COMMERCIAL

## 2023-09-20 ENCOUNTER — TELEPHONE (OUTPATIENT)
Age: 28
End: 2023-09-20

## 2023-09-20 DIAGNOSIS — F32.1 CURRENT MODERATE EPISODE OF MAJOR DEPRESSIVE DISORDER WITHOUT PRIOR EPISODE (HCC): Primary | ICD-10-CM

## 2023-09-20 PROCEDURE — 90791 PSYCH DIAGNOSTIC EVALUATION: CPT | Performed by: SOCIAL WORKER

## 2023-09-20 NOTE — PATIENT INSTRUCTIONS
Will reach out to [address] [place] [address] and complete referral for therapy and med mgmt. Bassam Ramos gives me permission to consult with psychiatry regarding med options until she can shivam seen by psychiatry. Provided my direct contact information and offered additional sessions if there is wait time.

## 2023-09-20 NOTE — PSYCH
Behavioral Health Psychotherapy Assessment    Date of Initial Psychotherapy Assessment: 09/20/23  Referral Source: PCP  Has a release of information been signed for the referral source? Yes    Preferred Name: Emiliano Espinoza  Preferred Pronouns: She/her  YOB: 1995 Age: 29 y.o. Sex assigned at birth: female   Gender Identity: female  Race:   Preferred Language: English    Emergency Contact:  Full Name: Beatriz Severs  Relationship to Client: mother  Contact information: [phone]    Primary Care Physician:  Mick Ruano, [address]  [place] [address] [employer]  [phone]  Has a release of information been signed? Yes    Physical Health History:  Past surgical procedures: none  Do you have a history of any of the following: other none  Do you have any mobility issues? 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 no    Diagnosis:   Diagnosis ICD-10-CM Associated Orders   1.  Current moderate episode of major depressive disorder without prior episode ([facility])  F32.1           Initial Assessment:     Current Mental Status:    Appearance: appropriate, casual and neat      Behavior/Manner: cooperative      Affect/Mood:  Anxious, depressed and irritable    Speech:  Normal    Sleep:  Interrupted    Oriented to: oriented to self and oriented to place       Clinical Symptoms    Depression: yes      Depression Symptoms: depressed mood, serious loss of interest in things, excessive crying, fatigue, poor concentration, weight gain, sleep disturbance, insomnia and irritable      Anxiety Symptoms: irritable      Have you ever been assaultive to others or the environment: No      Have you ever been self-injurious: No      Counseling History:  Previous Counseling or Treatment  (Mental Health or Drug & Alcohol): Yes    Previous Counseling Details:  Previous outpatient therapy  Have you previously taken psychiatric medications: Yes    Previous Medications Attempted:  Lexapro- had decreased energy and weight gain    Suicide Risk Assessment  Have you ever had a suicide attempt: No    Have you had incidents of suicidal ideation: Yes    Are you currently experiencing suicidal thoughts: No    Additional Suicide Risk Information:  Minimal risk- has extensive support system    Substance Abuse/Addiction Assessment:  Alcohol: No    Heroin: No    Fentanyl: No    Opiates: No    Cocaine: No    Amphetamines: No    Hallucinogens: No    Club Drugs: No    Benzodiazepines: No    Other Rx Meds: No    Marijuana: No    Tobacco/Nicotine: No    Have you experienced blackouts as a result of substance use: No    Have you had any periods of abstinence: No    Have you experienced symptoms of withdrawal: No    Have you ever overdosed on any substances?: No    Are you currently using any Medication Assisted Treatment for Substance Use: No      Compulsive Behaviors:  Compulsive Behavior Information:  No compulsive behavior but tends to "overthink" things    Disordered Eating History:  Do you have a history of disordered eating: No      Social Determinants of Health:    SDOH:  None    Legal History:    Have you ever been arrested  or had a DUI: No      Have you been incarcerated: No      Are you currently on parole/probation: No      Any current Children and Youth involvement: No      Any pending legal charges: No      Relationship History:    Current marital status:       Natural Supports: Mother, father, siblings and friends    Relationship History:  Happily . 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  Moved back home and in with her parents and was  from  for a year before he could transfer from work. He has just recently returned home. Sameer Dwyer is one of seven children. Has 5 older sisters and a younger brother. Has extensive support system. 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  Has Master's Degree in educatiuon from [employer] she is interning to complete her [place]'s certificate    Have you ever had an IEP or 504-plan: No      Recommended Treatment:     Psychotherapy:  Individual sessions and medication management    Frequency:  2 times    Session frequency:  Monthly      Visit start and stop times: 3:00-4:00    09/20/23

## 2023-09-25 DIAGNOSIS — F32.1 CURRENT MODERATE EPISODE OF MAJOR DEPRESSIVE DISORDER WITHOUT PRIOR EPISODE (HCC): ICD-10-CM

## 2023-09-25 DIAGNOSIS — F41.1 GENERALIZED ANXIETY DISORDER: ICD-10-CM

## 2023-09-26 RX ORDER — ESCITALOPRAM OXALATE 10 MG/1
TABLET ORAL
Qty: 30 TABLET | Refills: 0 | Status: SHIPPED | OUTPATIENT
Start: 2023-09-26

## 2023-09-27 ENCOUNTER — SOCIAL WORK (OUTPATIENT)
Dept: BEHAVIORAL/MENTAL HEALTH CLINIC | Facility: CLINIC | Age: 28
End: 2023-09-27
Payer: COMMERCIAL

## 2023-09-27 ENCOUNTER — TELEPHONE (OUTPATIENT)
Dept: PSYCHIATRY | Facility: CLINIC | Age: 28
End: 2023-09-27

## 2023-09-27 DIAGNOSIS — F32.1 CURRENT MODERATE EPISODE OF MAJOR DEPRESSIVE DISORDER WITHOUT PRIOR EPISODE (HCC): Primary | ICD-10-CM

## 2023-09-27 PROCEDURE — 90832 PSYTX W PT 30 MINUTES: CPT | Performed by: SOCIAL WORKER

## 2023-09-27 NOTE — PSYCH
Behavioral Health Psychotherapy Progress Note    Psychotherapy Provided: Individual Psychotherapy     1. Current moderate episode of major depressive disorder without prior episode ([address])            Goals addressed in session: Goal 1 Manage mood issues    DATA: Bassam Ramos aware that [address] left message today to contact them to complete phone intake for therapy. Unfortunately, she did not hear message until in my waiting room and has to leave for class at [facility]. I will reach out to intake and try and assist in facilitating their connection. Continues to struggle with sleep issues and will sometimes sleep out of sheer exhaustion from not sleeping and stress. Continues to struggle to manage her stress and mood issues stemming from her very difficult classroom environment. Continues to experience dread related to going into work. Continues to struggle with consistent fatigue, mood lability, fatigue, lack of energy, lack of motivation and lack of interest. Concentration and focus diminished concerned how this too affects her performance in the classroom and also in her [address] classes at [facility]. Assured her I had psychiatry consult her PCP regarding med options for her upcoming visit. Denies any SI. Has very good support from her spouse. During this session, this clinician used the following therapeutic modalities: Solution-Focused Therapy and Supportive Psychotherapy    Substance Abuse was addressed during this session. If the client is diagnosed with a co-occurring substance use disorder, please indicate any changes in the frequency or amount of use: none. Stage of change for addressing substance use diagnoses: No substance use/Not applicable    ASSESSMENT:  Angle Bermudez presents with a Anxious and Dysthymic mood. her affect is Constricted, which is congruent, with her mood and the content of the session. The client has not made progress on their goals.      Angle Bermudez presents with a minimal risk of suicide, minimal risk of self-harm, and minimal risk of harm to others. For any risk assessment that surpasses a "low" rating, a safety plan must be developed. A safety plan was indicated: no  If yes, describe in detail n/a    PLAN: Between sessions, Melissa Partida will contact [facility] tomorrow to complete phone intake for therapist. Encouraged to utilize brief periods dedicated to decompression via utilization of outlets and relaxation to try and offset stress. . At the next session, the therapist will use Solution-Focused Therapy and Supportive Psychotherapy to address mood issues. Behavioral Health Treatment Plan and Discharge Planning: Melissa Partida is aware of and agrees to continue to work on their treatment plan. They have identified and are working toward their discharge goals.  no    Visit start and stop times: 4:10-4:45    09/27/23

## 2023-09-27 NOTE — TELEPHONE ENCOUNTER
Contacted patient off of [facility]  to verify needs of services in attempts to offer patient an appointment at [facility] . LVM for patient to contact intake dept  in regards to an appointment with Dejuan Guadalupe.

## 2023-09-29 NOTE — TELEPHONE ENCOUNTER
Patient called and lvm she was returning a call she received in regards to scheduling, writer called patient back and lvm for her to call back at her earliest convenience

## 2023-10-10 ENCOUNTER — SOCIAL WORK (OUTPATIENT)
Dept: BEHAVIORAL/MENTAL HEALTH CLINIC | Facility: CLINIC | Age: 28
End: 2023-10-10
Payer: COMMERCIAL

## 2023-10-10 DIAGNOSIS — F32.1 CURRENT MODERATE EPISODE OF MAJOR DEPRESSIVE DISORDER WITHOUT PRIOR EPISODE (HCC): Primary | ICD-10-CM

## 2023-10-10 DIAGNOSIS — F41.1 GENERALIZED ANXIETY DISORDER: ICD-10-CM

## 2023-10-10 PROCEDURE — 90791 PSYCH DIAGNOSTIC EVALUATION: CPT | Performed by: COUNSELOR

## 2023-10-10 NOTE — PSYCH
Assessment/Plan:      Diagnoses and all orders for this visit:    Current moderate episode of major depressive disorder without prior episode ([address])    Generalized anxiety disorder          Subjective:     Patient ID: Melissa Partida is a 29 y.o. female. Risk Assessment:   The following ratings are based on my interview(s) with interview with Pt    Risk of Harm to Self:   Demographic risk factors include   Historical Risk Factors include  not discussed- Pt does not go into detail   Recent Specific Risk Factors include passive death wishes, experienced persistent ideation, experienced fleeting ideation, diagnosis of depression , and not going into more detail   Additional Factors for a Child or Adolescent  NA    Risk of Harm to Others:   Demographic Risk Factors include  NA  Historical Risk Factors include  NA  Recent Specific Risk Factors include  NA    Access to Weapons:   [place] has access to the following weapons: NA. The following steps have been taken to ensure weapons are properly secured: Na    Based on the above information, the client presents the following risk of harm to self or others:  medium    The following interventions are recommended:   referral to PHP and/or IOP but refused due to work schedule     Notes regarding this Risk Assessment: Pt seemed guarded talking about her history and did not provide much detail throughout the assessment; she refuses higher level of care and agrees to call [phone] or [phone] depending on intent and if her contact persons are unavailable       Behavioral Health Psychotherapy Assessment    Date of Initial Psychotherapy Assessment: 10/10/23  Referral Source: self  Has a release of information been signed for the referral source? NA    Preferred Name: Melissa Partida  Preferred Pronouns: She/her  YOB: 1995 Age: 29 y.o.   Sex assigned at birth: female   Gender Identity: F  Race:   Preferred Language: English    Emergency Contact  Full Name: Brit Figueredo Gil Carpenter  Relationship to Client: mother  Contact information: [phone]    Primary Care Physician:  David Daniel, [address] Harry Venegas 101 100  [place] [address] [place] [address] Drive  [phone]  Has a release of information been signed? No    Physical Health History:  Past surgical procedures: NA  Do you have a history of any of the following: other NA  Do you have any mobility issues? No    Relevant Family History:  Engaged to be ; no history of abusive relationships; Pt did not want to go in further detail into her history of relationships or family dynamics     Presenting Problem (What brings you in?)  Depression and anxiety- ups and downs, she explains, hard to get out of bed and do physical activities; racing thoughts; playing with the dog and walking him provides some activity but not enough to feel engaged fully into her daily life; Pt did not go into detail about her history; does not want to receive medication management services but only to improve her mood and feel better. Mental Health Advance Directive:  Do you currently have a Mental Health Advance Directive? no    Diagnosis:   Diagnosis ICD-10-CM Associated Orders   1. Current moderate episode of major depressive disorder without prior episode ([address])  F32.1       2.  Generalized anxiety disorder  F41.1           Initial Assessment:     Current Mental Status:    Appearance: appropriate and casual      Behavior/Manner: cooperative and guarded      Affect/Mood:  Depressed, anxious, irritable and stable    Speech:  Normal    Sleep:  Interrupted    Oriented to: oriented to self, oriented to place and oriented to time       Clinical Symptoms    Depression: yes      Anxiety: yes      Depression Symptoms: depressed mood, restlessness, serious loss of interest in things, thoughts that death would be easier, suicidal ideation, excessive crying, social isolation, fatigue, indecision, weight gain, sleep disturbance, hypersomnia, decreased libido and irritable      Anxiety Symptoms: excessive worry, fatigues easily, muscle tension, irritable, nervous/anxious and restlessness      Have you ever been assaultive to others or the environment: No      Have you ever been self-injurious: No      Counseling History:  Previous Counseling or Treatment  (Mental Health or Drug & Alcohol): Yes    Previous Counseling Details:  Outpatient history of counseling - helped her with negative thinking and information about how the nervous system works  Have you previously taken psychiatric medications: Yes    Previous Medications Attempted:  Lexapro    Suicide Risk Assessment  Have you ever had a suicide attempt: No    Have you had incidents of suicidal ideation: Yes    Are you currently experiencing suicidal thoughts: Yes    Additional Suicide Risk Information:  Broad and specific thoughts currently- denies having plans to act on them and refuses to go through [facility] or [facility], bringing her work schedule as a reason    Substance Abuse/Addiction Assessment:  Alcohol: No    Heroin: No    Fentanyl: No    Opiates: No    Cocaine: No    Amphetamines: No    Hallucinogens: No    Club Drugs: No    Benzodiazepines: No    Other Rx Meds: Yes    Age of First Use:  Jan, 2022  Age of regular use:  Still on it  Frequency:  Daily  Amount:  Once daily 20 mg  Method:  Tablet/capsule  Last Use:  Today   Rx Medications used:  Lexapro  Marijuana: No    Tobacco/Nicotine: No    Have you experienced blackouts as a result of substance use: No    Have you had any periods of abstinence: No    Have you experienced symptoms of withdrawal: No    Have you ever overdosed on any substances?: No    Are you currently using any Medication Assisted Treatment for Substance Use: No      Compulsive Behaviors:  Compulsive Behavior Information:  NA    Disordered Eating History:  Do you have a history of disordered eating: No      Social Determinants of Health:    SDOH:  Stress    Trauma and Abuse History:    Have you ever been abused: No      Legal History:    Have you ever been arrested  or had a DUI: No      Have you been incarcerated: No      Are you currently on parole/probation: No      Any current Children and Youth involvement: No      Any pending legal charges: No      Relationship History:    Current marital status: single      Natural Supports: Mother and father    Relationship History:  Engaged  In a supportive relationship; Pt does not go into detail about her history of relationships     Employment History    Are you currently employed: Yes      Longest period of employment:  5 years     Employer/ Job title:  [employer]     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    Sources of income/financial support:  Work and family members     History:      Status: no history of [address] duty  Educational History:     Have you ever been diagnosed with a learning disability: No      Highest level of education:  Master's Degree    School attended/attendin [employer] and [employer]     Have you ever had an IEP or 504-plan: No      Do you need assistance with reading or writing: No      Recommended Treatment:     Psychotherapy:  Individual sessions, Group therapy sessions and Higher level of care    Frequency:  2 times    Session frequency:  Monthly    Group Therapy Sessions info: Offered [facility] group but refused due to work schedule    Higher level of care:   Offered PHP or IOP but refused due to work schedule      Visit start and stop times:    10/10/23  Start Time: 0507  Stop Time: 0660  Total Visit Time: 48 minutes

## 2023-10-10 NOTE — BH TREATMENT PLAN
Outpatient Behavioral Health Psychotherapy Treatment Plan    Sangteodoro Heaven  1995     Date of Initial Psychotherapy Assessment: 10/10/23   Date of Current Treatment Plan: 10/10/23  Treatment Plan Target Date: TBD  Treatment Plan Expiration Date: 4/9/2024    Diagnosis:   1. Current moderate episode of major depressive disorder without prior episode ([address])        2.  Generalized anxiety disorder            Area(s) of Need: mood, depression as a more pressing issue and anxiety     Long Term Goal 1 (in the client's own words): I want to feel more at peace     Stage of Change: Contemplation    Target Date for completion: TBD     Anticipated therapeutic modalities: Person-Centered, DBT, CBT, Somatic Experiencing      People identified to complete this goal: Patient, Doctor, Therapist       Objective 1: (identify the means of measuring success in meeting the objective): will walk for 10 mins daily to counter inactivity       Objective 2: (identify the means of measuring success in meeting the objective): will follow a strict medication managemetn schedule       Long Term Goal 2 (in the client's own words): I want to have less intrusive thoughts     Stage of Change: Contemplation and some action     Target Date for completion: TBD     Anticipated therapeutic modalities: Person-Centered, CBT, DBT     People identified to complete this goal: Patient, Doctor, Therapist       Objective 1: (identify the means of measuring success in meeting the objective): will work on rumination to minimize the time spent and will journal when possible       Objective 2: (identify the means of measuring success in meeting the objective): will identify most intrusive thoughts and will start the Workbook        I am currently under the care of a [facility] psychiatric provider: yes    My [facility] psychiatric provider is: Dr. Carmelita Cabral     I am currently taking psychiatric medications: Yes, as prescribed    I feel that I will be ready for discharge from mental health care when I reach the following (measurable goal/objective): I have some sense of peace in my mind     For children and adults who have a legal guardian:   Has there been any change to custody orders and/or guardianship status? NA. If yes, attach updated documentation. I have not created my Crisis Plan and have been offered a copy of this plan planned for the next session. Behavioral Health Treatment Plan [facility]: Diagnosis and Treatment Plan explained to Elkin Rivas acknowledges an understanding of their diagnosis. Shira Ramos agrees to this treatment plan.     I have been offered a copy of this Treatment Plan. yes

## 2023-10-12 ENCOUNTER — OFFICE VISIT (OUTPATIENT)
Dept: FAMILY MEDICINE CLINIC | Facility: CLINIC | Age: 28
End: 2023-10-12
Payer: COMMERCIAL

## 2023-10-12 VITALS
DIASTOLIC BLOOD PRESSURE: 80 MMHG | HEIGHT: 65 IN | OXYGEN SATURATION: 98 % | RESPIRATION RATE: 14 BRPM | TEMPERATURE: 97.6 F | HEART RATE: 104 BPM | BODY MASS INDEX: 33.09 KG/M2 | SYSTOLIC BLOOD PRESSURE: 100 MMHG | WEIGHT: 198.6 LBS

## 2023-10-12 DIAGNOSIS — F32.1 CURRENT MODERATE EPISODE OF MAJOR DEPRESSIVE DISORDER WITHOUT PRIOR EPISODE (HCC): ICD-10-CM

## 2023-10-12 DIAGNOSIS — F41.1 GENERALIZED ANXIETY DISORDER: ICD-10-CM

## 2023-10-12 PROCEDURE — 99214 OFFICE O/P EST MOD 30 MIN: CPT | Performed by: NURSE PRACTITIONER

## 2023-10-12 RX ORDER — ESCITALOPRAM OXALATE 10 MG/1
5 TABLET ORAL DAILY
Qty: 30 TABLET | Refills: 0
Start: 2023-10-12

## 2023-10-12 NOTE — PROGRESS NOTES
Name: Gaviota Wheat      : 1995      MRN: [id number]  Encounter Provider: COREY Agusitn  Encounter Date: 10/12/2023   Encounter department: [facility]   I have spent a total time of 30 minutes on 10/12/23 in caring for this patient including Diagnostic results, Prognosis, Risks and benefits of tx options, Instructions for management, Patient and family education, Importance of tx compliance, Risk factor reductions, Impressions, Counseling / Coordination of care, Documenting in the medical record, Reviewing / ordering tests, medicine, procedures  , and Obtaining or reviewing history  . 1. Generalized anxiety disorder  Assessment & Plan:  VANDANA-7 Flowsheet Screening      Flowsheet Row Most Recent Value   Over the last 2 weeks, how often have you been bothered by any of the following problems? Feeling nervous, anxious, or on edge 3   Not being able to stop or control worrying 3   Worrying too much about different things 3   Trouble relaxing 3   Being so restless that it is hard to sit still 2   Becoming easily annoyed or irritable 3   Feeling afraid as if something awful might happen 1   VANDANA-7 Total Score 18        Not under adequate control on escitalopram, wellbutrin. Reviewed genesight results and discussed options to adjust her medication. We also reviewed the recommendation of the psychiatrist who reviewed her chart at the request of the therapist to consider trintellix. After discussion, pt would like to start by first getting off of the escitalopram and see how she does with just the wellbutrin before alternatives are added or tried. She will reduce escitalopram to 5 mg daily for the next month and continue the wellbutrin. Follow up 1 month but pt will call if there is an issue. Orders:  -     escitalopram (LEXAPRO) 10 mg tablet; Take 0.5 tablets (5 mg total) by mouth daily  -     TSH, 3rd generation with Free T4 reflex; Future    2. Current moderate episode of major depressive disorder without prior episode [facility])  Assessment & Plan:  PHQ-2/9 Depression Screening    Little interest or pleasure in doing things: 2 - more than half the days  Feeling down, depressed, or hopeless: 2 - more than half the days  Trouble falling or staying asleep, or sleeping too much: 2 - more than half the days  Feeling tired or having little energy: 3 - nearly every day  Poor appetite or overeatin - more than half the days  Feeling bad about yourself - or that you are a failure or have let yourself or your family down: 3 - nearly every day  Trouble concentrating on things, such as reading the newspaper or watching television: 3 - nearly every day  Moving or speaking so slowly that other people could have noticed. Or the opposite - being so fidgety or restless that you have been moving around a lot more than usual: 1 - several days  Thoughts that you would be better off dead, or of hurting yourself in some way: 1 - several days  PHQ-9 Score: 19   PHQ-9 Interpretation: Moderately severe depression        Passive thoughts of death, no intention or desire to take action or harm self. Not under adequate control on escitalopram, wellbutrin. Reviewed genesight results and discussed options to adjust her medication. We also reviewed the recommendation of the psychiatrist who reviewed her chart at the request of the therapist to consider trintellix. After discussion, pt would like to start by first getting off of the escitalopram and see how she does with just the wellbutrin before alternatives are added or tried. She will reduce escitalopram to 5 mg daily for the next month and continue the wellbutrin. Follow up 1 month but pt will call if there is an issue. Orders:  -     escitalopram (LEXAPRO) 10 mg tablet; Take 0.5 tablets (5 mg total) by mouth daily  -     TSH, 3rd generation with Free T4 reflex;  Future           Subjective      Pt is a 30 yo female here to discuss management of depression and anxiety. She was initially started on sertraline approximately 2021 and she did not feel it was helping and she felt "numb."  She was still depressed but couldn't cry. She was off medication for about 5 months and currently takes escitalopram 10 mg and wellbutrin 150 mg was most recently added in August.  The wellbutrin was added because she was experiencing a lack of motivation. She does not think it has helped. She has also noticed a change in her demeanor in the summer, she feels she gets irritated more easily, less patient. She feels less focused and has a hard time accomplishing tasks. She feels it is effecting her work. She had genesight testing done in 2021 and [facility] requested review by psychiatry and they reviewed his comments and her report and Dr. Valerio Villasenor suggested Trintellix based on both her symptoms as well as her genesight profile. Review of Systems   Constitutional:  Positive for fatigue. Negative for appetite change. Respiratory:  Negative for shortness of breath. Cardiovascular:  Negative for chest pain and palpitations. Gastrointestinal:  Negative for nausea and vomiting. Neurological:  Positive for dizziness and headaches. Negative for light-headedness. Psychiatric/Behavioral:  Positive for decreased concentration, dysphoric mood and suicidal ideas (passive thoughs of death). Negative for self-injury and sleep disturbance. The patient is nervous/anxious. The patient is not hyperactive.         Current Outpatient Medications on File Prior to Visit   Medication Sig    Biotin 10 MG CHEW Chew    buPROPion (WELLBUTRIN XL) 150 mg 24 hr tablet Take 1 tablet (150 mg total) by mouth every morning    Multiple Vitamins-Calcium (ONE-A-DAY WOMENS FORMULA PO) Take by mouth    [DISCONTINUED] escitalopram (LEXAPRO) 10 mg tablet TAKE ONE TABLET BY MOUTH EVERY DAY       Objective     /80 (BP Location: Left arm, Patient Position: Sitting, Cuff Size: Standard)   Pulse 104   Temp 97.6 °F (36.4 °C) (Tympanic)   Resp 14   Ht 5' 4.57" (1.64 m)   Wt 90.1 kg (198 lb 9.6 oz)   LMP 09/16/2023 (Approximate)   SpO2 98%   BMI 33.49 kg/m²     Physical Exam  Vitals reviewed. Constitutional:       General: She is awake. She is not in acute distress. Appearance: Normal appearance. She is well-developed and well-groomed. She is not ill-appearing or diaphoretic. Eyes:      General: Lids are normal.      Conjunctiva/sclera: Conjunctivae normal.   Pulmonary:      Effort: Pulmonary effort is normal.   Skin:     General: Skin is dry. Neurological:      Mental Status: She is alert and oriented to person, place, and time. Psychiatric:         Attention and Perception: Attention normal.         Mood and Affect: Mood normal.         Speech: Speech normal.         Behavior: Behavior normal. Behavior is cooperative. Thought Content:  Thought content normal.         Cognition and Memory: Cognition normal.         Judgment: Judgment normal.       COREY Chapman

## 2023-10-12 NOTE — ASSESSMENT & PLAN NOTE
VANDANA-7 Flowsheet Screening      Flowsheet Row Most Recent Value   Over the last 2 weeks, how often have you been bothered by any of the following problems? Feeling nervous, anxious, or on edge 3   Not being able to stop or control worrying 3   Worrying too much about different things 3   Trouble relaxing 3   Being so restless that it is hard to sit still 2   Becoming easily annoyed or irritable 3   Feeling afraid as if something awful might happen 1   VANDANA-7 Total Score 18        Not under adequate control on escitalopram, wellbutrin. Reviewed [employer] results and discussed options to adjust her medication. We also reviewed the recommendation of the psychiatrist who reviewed her chart at the request of the therapist to consider trintellix. After discussion, pt would like to start by first getting off of the escitalopram and see how she does with just the wellbutrin before alternatives are added or tried. She will reduce escitalopram to 5 mg daily for the next month and continue the wellbutrin. Follow up 1 month but pt will call if there is an issue.

## 2023-10-12 NOTE — ASSESSMENT & PLAN NOTE
PHQ-2/9 Depression Screening    Little interest or pleasure in doing things: 2 - more than half the days  Feeling down, depressed, or hopeless: 2 - more than half the days  Trouble falling or staying asleep, or sleeping too much: 2 - more than half the days  Feeling tired or having little energy: 3 - nearly every day  Poor appetite or overeatin - more than half the days  Feeling bad about yourself - or that you are a failure or have let yourself or your family down: 3 - nearly every day  Trouble concentrating on things, such as reading the newspaper or watching television: 3 - nearly every day  Moving or speaking so slowly that other people could have noticed. Or the opposite - being so fidgety or restless that you have been moving around a lot more than usual: 1 - several days  Thoughts that you would be better off dead, or of hurting yourself in some way: 1 - several days  PHQ-9 Score: 19   PHQ-9 Interpretation: Moderately severe depression        Passive thoughts of death, no intention or desire to take action or harm self. Not under adequate control on escitalopram, wellbutrin. Reviewed [employer] results and discussed options to adjust her medication. We also reviewed the recommendation of the psychiatrist who reviewed her chart at the request of the therapist to consider trintellix. After discussion, pt would like to start by first getting off of the escitalopram and see how she does with just the wellbutrin before alternatives are added or tried. She will reduce escitalopram to 5 mg daily for the next month and continue the wellbutrin. Follow up 1 month but pt will call if there is an issue.

## 2023-11-02 ENCOUNTER — OFFICE VISIT (OUTPATIENT)
Dept: FAMILY MEDICINE CLINIC | Facility: CLINIC | Age: 28
End: 2023-11-02
Payer: COMMERCIAL

## 2023-11-02 VITALS
SYSTOLIC BLOOD PRESSURE: 126 MMHG | HEIGHT: 64 IN | BODY MASS INDEX: 34.08 KG/M2 | HEART RATE: 99 BPM | OXYGEN SATURATION: 97 % | WEIGHT: 199.6 LBS | TEMPERATURE: 96.6 F | RESPIRATION RATE: 16 BRPM | DIASTOLIC BLOOD PRESSURE: 72 MMHG

## 2023-11-02 DIAGNOSIS — F32.1 CURRENT MODERATE EPISODE OF MAJOR DEPRESSIVE DISORDER WITHOUT PRIOR EPISODE (HCC): ICD-10-CM

## 2023-11-02 DIAGNOSIS — F41.1 GENERALIZED ANXIETY DISORDER: ICD-10-CM

## 2023-11-02 PROCEDURE — 99214 OFFICE O/P EST MOD 30 MIN: CPT | Performed by: NURSE PRACTITIONER

## 2023-11-02 RX ORDER — BUSPIRONE HYDROCHLORIDE 5 MG/1
5 TABLET ORAL 2 TIMES DAILY
Qty: 60 TABLET | Refills: 5 | Status: SHIPPED | OUTPATIENT
Start: 2023-11-02

## 2023-11-02 RX ORDER — BUPROPION HYDROCHLORIDE 300 MG/1
300 TABLET ORAL EVERY MORNING
Qty: 30 TABLET | Refills: 5 | Status: SHIPPED | OUTPATIENT
Start: 2023-11-02 | End: 2024-04-30

## 2023-11-02 NOTE — ASSESSMENT & PLAN NOTE
VANDANA-7 Flowsheet Screening      Flowsheet Row Most Recent Value   Over the last 2 weeks, how often have you been bothered by any of the following problems? Feeling nervous, anxious, or on edge 2   Not being able to stop or control worrying 2   Worrying too much about different things 3   Trouble relaxing 2   Being so restless that it is hard to sit still 2   Becoming easily annoyed or irritable 1   Feeling afraid as if something awful might happen 1   VANDANA-7 Total Score 13        Improvement from previous score of 18, will further wean off of escitalopram by taking 5 mg qod and will start buspirone 5 mg bid. This was on her Novopyxis testing as having no interaction. Will follow up as scheduled on 11/16, she should message me with any concerns prior.

## 2023-11-02 NOTE — ASSESSMENT & PLAN NOTE
PHQ-2/9 Depression Screening    Little interest or pleasure in doing things: 1 - several days  Feeling down, depressed, or hopeless: 2 - more than half the days  Trouble falling or staying asleep, or sleeping too much: 1 - several days  Feeling tired or having little energy: 2 - more than half the days  Poor appetite or overeatin - more than half the days  Feeling bad about yourself - or that you are a failure or have let yourself or your family down: 2 - more than half the days  Trouble concentrating on things, such as reading the newspaper or watching television: 2 - more than half the days  Moving or speaking so slowly that other people could have noticed. Or the opposite - being so fidgety or restless that you have been moving around a lot more than usual: 2 - more than half the days  Thoughts that you would be better off dead, or of hurting yourself in some way: 1 - several days  PHQ-9 Score: 15   PHQ-9 Interpretation: Moderately severe depression        Improvement form previous score of 19, less angry and agitated but still lacks motivation. No SI or passive thoughts of death. Acute increase Monday/Tuesday possibly related to onset of menstrual period, recommend she discuss restarting norah with her gyn. Needs to schedule. Will further wean escitalopram to 5 mg qod until visit in 2 weeks. Increase wellbutrin to 300 mg daily. Follow up as scheduled , call with any concerns prior.

## 2023-11-02 NOTE — PROGRESS NOTES
Name: Starr Wellington      : 1995      MRN: [id number]  Encounter Provider: COREY Ornelas  Encounter Date: 2023   Encounter department: [facility]   I have spent a total time of 30 minutes on 23 in caring for this patient including Prognosis, Risks and benefits of tx options, Instructions for management, Patient and family education, Importance of tx compliance, Risk factor reductions, Impressions, Counseling / Coordination of care, Documenting in the medical record, Reviewing / ordering tests, medicine, procedures  , and Obtaining or reviewing history  . 1. Generalized anxiety disorder  Assessment & Plan:  VANDANA-7 Flowsheet Screening      Flowsheet Row Most Recent Value   Over the last 2 weeks, how often have you been bothered by any of the following problems? Feeling nervous, anxious, or on edge 2   Not being able to stop or control worrying 2   Worrying too much about different things 3   Trouble relaxing 2   Being so restless that it is hard to sit still 2   Becoming easily annoyed or irritable 1   Feeling afraid as if something awful might happen 1   VANDANA-7 Total Score 13        Improvement from previous score of 18, will further wean off of escitalopram by taking 5 mg qod and will start buspirone 5 mg bid. This was on her genesCasabu testing as having no interaction. Will follow up as scheduled on , she should message me with any concerns prior. Orders:  -     buPROPion (WELLBUTRIN XL) 300 mg 24 hr tablet; Take 1 tablet (300 mg total) by mouth every morning  -     busPIRone (BUSPAR) 5 mg tablet; Take 1 tablet (5 mg total) by mouth 2 (two) times a day  -     Ambulatory referral to [address] [place] [address]; Future    2.  Current moderate episode of major depressive disorder without prior episode [facility])  Assessment & Plan:  PHQ-2/9 Depression Screening    Little interest or pleasure in doing things: 1 - several days  Feeling down, depressed, or hopeless: 2 - more than half the days  Trouble falling or staying asleep, or sleeping too much: 1 - several days  Feeling tired or having little energy: 2 - more than half the days  Poor appetite or overeatin - more than half the days  Feeling bad about yourself - or that you are a failure or have let yourself or your family down: 2 - more than half the days  Trouble concentrating on things, such as reading the newspaper or watching television: 2 - more than half the days  Moving or speaking so slowly that other people could have noticed. Or the opposite - being so fidgety or restless that you have been moving around a lot more than usual: 2 - more than half the days  Thoughts that you would be better off dead, or of hurting yourself in some way: 1 - several days  PHQ-9 Score: 15   PHQ-9 Interpretation: Moderately severe depression        Improvement form previous score of 19, less angry and agitated but still lacks motivation. No SI or passive thoughts of death. Acute increase Monday/Tuesday possibly related to onset of menstrual period, recommend she discuss restarting norah with her gyn. Needs to schedule. Will further wean escitalopram to 5 mg qod until visit in 2 weeks. Increase wellbutrin to 300 mg daily. Follow up as scheduled , call with any concerns prior. Orders:  -     buPROPion (WELLBUTRIN XL) 300 mg 24 hr tablet; Take 1 tablet (300 mg total) by mouth every morning  -     Ambulatory referral to [address] [place] [address]; Future           Subjective      Pt is a 29 y.o. y/o female who is seen today for follow up to management of anxiety and depression. Initally started on sertraline in  but felt "numb."  She was still depressed but couldn't cry. She was than put on escitalopram 10 mg with wellbutrin 150 mg added in August due to a lack of motivation. She did not think that helped and she was becoming irritated more easily and less patient and struggling to concentrate and get things done. She did have genesight testing done in 2021 and this was reviewed recently by psychiatry who suggested trintellix. At her visit 10/12 she reported passive thoughts of death with suicidal ideation and with severe anxiety. After discussing options and alternatives pt preference was to stop taking the escitalopram first to see how she tolerated just being on wellbutrin. The escitalopram was decreased to 5 mg for a month and plan was for follow up then. She contacted the office on 10/30 and reported that since 10/27 she was feeling persistently sad and hopeless and requested to be seen sooner. She notes that symptoms were terrible on 10/29 and 10/30, she was severely depressed and crying all day. She felt back to her baseline by 10/31. She notes she got her period on 10/29, unsure if that triggered the change. She does note that since reducing the escitalopram to 5 mg she is less angry and irritable, particularly at work. Still with lack of motivation. No SI or passive thoughts of death. Review of Systems   Constitutional:  Positive for fatigue. Negative for appetite change. Psychiatric/Behavioral:  Positive for dysphoric mood. Negative for agitation, self-injury, sleep disturbance and suicidal ideas. The patient is nervous/anxious.         Current Outpatient Medications on File Prior to Visit   Medication Sig    Biotin 10 MG CHEW Chew    escitalopram (LEXAPRO) 10 mg tablet Take 0.5 tablets (5 mg total) by mouth daily    Multiple Vitamins-Calcium (ONE-A-DAY WOMENS FORMULA PO) Take by mouth    [DISCONTINUED] buPROPion (WELLBUTRIN XL) 150 mg 24 hr tablet Take 1 tablet (150 mg total) by mouth every morning       Objective     /72 (BP Location: Left arm, Patient Position: Sitting, Cuff Size: Large)   Pulse 99   Temp (!) 96.6 °F (35.9 °C) (Tympanic)   Resp 16   Ht 5' 4" (1.626 m)   Wt 90.5 kg (199 lb 9.6 oz)   LMP 09/16/2023 (Approximate)   SpO2 97%   BMI 34.26 kg/m²     Physical Exam  Vitals reviewed. Constitutional:       General: She is awake. She is not in acute distress. Appearance: Normal appearance. She is well-developed and well-groomed. She is not ill-appearing or diaphoretic. Eyes:      General: Lids are normal.      Conjunctiva/sclera: Conjunctivae normal.   Pulmonary:      Effort: Pulmonary effort is normal.   Skin:     General: Skin is dry. Neurological:      Mental Status: She is alert and oriented to person, place, and time. Psychiatric:         Attention and Perception: Attention normal.         Mood and Affect: Mood normal.         Speech: Speech normal.         Behavior: Behavior normal. Behavior is cooperative. Thought Content:  Thought content normal.         Cognition and Memory: Cognition normal.         Judgment: Judgment normal.       COREY Christensen

## 2023-11-03 ENCOUNTER — TELEPHONE (OUTPATIENT)
Dept: PSYCHIATRY | Facility: CLINIC | Age: 28
End: 2023-11-03

## 2023-11-03 NOTE — TELEPHONE ENCOUNTER
Reached out to patient in regards to routine referral and adding to proper wait list. Left VM for pt to contact intake department. H Plasty Text: Given the location of the defect, shape of the defect and the proximity to free margins a H-plasty was deemed most appropriate for repair. Using a sterile surgical marker, the appropriate advancement arms of the H-plasty were drawn incorporating the defect and placing the expected incisions within the relaxed skin tension lines where possible. The area thus outlined was incised deep to adipose tissue with a #15 scalpel blade. The skin margins were undermined to an appropriate distance in all directions utilizing iris scissors.  The opposing advancement arms were then advanced and carried over into place in opposite direction and anchored with interrupted buried subcutaneous sutures.

## 2023-11-12 DIAGNOSIS — F41.1 GENERALIZED ANXIETY DISORDER: ICD-10-CM

## 2023-11-12 DIAGNOSIS — F32.1 CURRENT MODERATE EPISODE OF MAJOR DEPRESSIVE DISORDER WITHOUT PRIOR EPISODE (HCC): ICD-10-CM

## 2023-11-13 ENCOUNTER — RA CDI HCC (OUTPATIENT)
Dept: OTHER | Facility: HOSPITAL | Age: 28
End: 2023-11-13

## 2023-11-13 RX ORDER — ESCITALOPRAM OXALATE 10 MG/1
10 TABLET ORAL DAILY
Qty: 90 TABLET | Refills: 1 | Status: SHIPPED | OUTPATIENT
Start: 2023-11-13 | End: 2023-11-17 | Stop reason: ALTCHOICE

## 2023-11-13 NOTE — PROGRESS NOTES
[address] [facility] coding opportunities       Chart reviewed, no opportunity found: CHART REVIEWED, NO OPPORTUNITY FOUND        Patients Insurance        Commercial Insurance: Raul Granados

## 2023-11-14 ENCOUNTER — DOCUMENTATION (OUTPATIENT)
Dept: BEHAVIORAL/MENTAL HEALTH CLINIC | Facility: CLINIC | Age: 28
End: 2023-11-14

## 2023-11-14 NOTE — PROGRESS NOTES
Th contacted Pt to discuss her intent to continuous therapy. Araceli Dumont responded but got disconnected.  Th left a voicemail encouraging Pt to call back and share her intent after her initial eval and cancelled follow up appointment and a letter of intent sent/

## 2023-11-15 ENCOUNTER — DOCUMENTATION (OUTPATIENT)
Dept: BEHAVIORAL/MENTAL HEALTH CLINIC | Facility: CLINIC | Age: 28
End: 2023-11-15

## 2023-11-15 NOTE — PROGRESS NOTES
Psychotherapy Discharge Summary    Preferred Name: Akosua Thao  YOB: 1995    Admission date to psychotherapy: 10/10/23    Referred by: self, doctors    Presenting Problem: depression, anxiety    Course of treatment included : psychoeducation and psychiatric evaluation    Progress/Outcome of Treatment Goals (brief summary of course of treatment) NA    Treatment Complications (if any): NA    Treatment Progress: poor    Current [facility] Psychiatric Provider: NA    Discharge Medications include: as noted in chart    Discharge Date: 11/15/23    Discharge Diagnosis: No diagnosis found. Current moderate episode of major depressive disorder without prior episode ([address])         2.  Generalized anxiety disorder     Criteria for Discharge:  cancelled follow up and did not respond to outreach    Aftercare recommendations include (include specific referral names and phone numbers, if appropriate): NA    Prognosis: poor

## 2023-11-16 ENCOUNTER — TELEPHONE (OUTPATIENT)
Dept: PSYCHIATRY | Facility: CLINIC | Age: 28
End: 2023-11-16

## 2023-11-17 ENCOUNTER — OFFICE VISIT (OUTPATIENT)
Dept: FAMILY MEDICINE CLINIC | Facility: CLINIC | Age: 28
End: 2023-11-17
Payer: COMMERCIAL

## 2023-11-17 VITALS
TEMPERATURE: 96.4 F | RESPIRATION RATE: 16 BRPM | SYSTOLIC BLOOD PRESSURE: 115 MMHG | HEIGHT: 64 IN | WEIGHT: 198 LBS | BODY MASS INDEX: 33.8 KG/M2 | HEART RATE: 93 BPM | DIASTOLIC BLOOD PRESSURE: 70 MMHG | OXYGEN SATURATION: 99 %

## 2023-11-17 DIAGNOSIS — F32.1 CURRENT MODERATE EPISODE OF MAJOR DEPRESSIVE DISORDER WITHOUT PRIOR EPISODE (HCC): Primary | ICD-10-CM

## 2023-11-17 DIAGNOSIS — F41.1 GENERALIZED ANXIETY DISORDER: ICD-10-CM

## 2023-11-17 PROCEDURE — 99214 OFFICE O/P EST MOD 30 MIN: CPT | Performed by: NURSE PRACTITIONER

## 2023-11-17 NOTE — ASSESSMENT & PLAN NOTE
VANDANA-7 Flowsheet Screening      Flowsheet Row Most Recent Value   Over the last 2 weeks, how often have you been bothered by any of the following problems? Feeling nervous, anxious, or on edge 2   Not being able to stop or control worrying 2   Worrying too much about different things 2   Trouble relaxing 1   Being so restless that it is hard to sit still 1   Becoming easily annoyed or irritable 1   Feeling afraid as if something awful might happen 0   VANDANA-7 Total Score 9        Improved with addition of buspirone. Pt feels stable with current management. , continue current dosing 5 mg bid. Follow up 1 month. Will call psych to schedule.

## 2023-11-17 NOTE — ASSESSMENT & PLAN NOTE
PHQ-2/9 Depression Screening    Little interest or pleasure in doing things: 1 - several days  Feeling down, depressed, or hopeless: 1 - several days  Trouble falling or staying asleep, or sleeping too much: 1 - several days  Feeling tired or having little energy: 1 - several days  Poor appetite or overeatin - several days  Feeling bad about yourself - or that you are a failure or have let yourself or your family down: 2 - more than half the days  Trouble concentrating on things, such as reading the newspaper or watching television: 1 - several days  Moving or speaking so slowly that other people could have noticed. Or the opposite - being so fidgety or restless that you have been moving around a lot more than usual: 0 - not at all  Thoughts that you would be better off dead, or of hurting yourself in some way: 0 - not at all  PHQ-9 Score: 8   PHQ-9 Interpretation: Mild depression        Improved with increase of wellbutrin to 300 mg.  D/c escitalopram.  Feels stable with current management. , continue treatment.   Will call to schedule with psych, looking for a therapist.  Follow up 1 month at physical.

## 2023-11-17 NOTE — PROGRESS NOTES
Name: Brenda Gonzalez      : 1995      MRN: [id number]  Encounter Provider: COREY Greenwood  Encounter Date: 2023   Encounter department: [facility]   I have spent a total time of 30 minutes on 23 in caring for this patient including Prognosis, Risks and benefits of tx options, Instructions for management, Patient and family education, Importance of tx compliance, Risk factor reductions, Impressions, Counseling / Coordination of care, Documenting in the medical record, Reviewing / ordering tests, medicine, procedures  , and Obtaining or reviewing history  . 1. Current moderate episode of major depressive disorder without prior episode [facility])  Assessment & Plan:  PHQ-2/9 Depression Screening    Little interest or pleasure in doing things: 1 - several days  Feeling down, depressed, or hopeless: 1 - several days  Trouble falling or staying asleep, or sleeping too much: 1 - several days  Feeling tired or having little energy: 1 - several days  Poor appetite or overeatin - several days  Feeling bad about yourself - or that you are a failure or have let yourself or your family down: 2 - more than half the days  Trouble concentrating on things, such as reading the newspaper or watching television: 1 - several days  Moving or speaking so slowly that other people could have noticed. Or the opposite - being so fidgety or restless that you have been moving around a lot more than usual: 0 - not at all  Thoughts that you would be better off dead, or of hurting yourself in some way: 0 - not at all  PHQ-9 Score: 8   PHQ-9 Interpretation: Mild depression        Improved with increase of wellbutrin to 300 mg.  D/c escitalopram.  Feels stable with current management. , continue treatment.   Will call to schedule with psych, looking for a therapist.  Follow up 1 month at physical.       2. Generalized anxiety disorder  Assessment & Plan:  VANDANA-7 Flowsheet Screening Flowsheet Row Most Recent Value   Over the last 2 weeks, how often have you been bothered by any of the following problems? Feeling nervous, anxious, or on edge 2   Not being able to stop or control worrying 2   Worrying too much about different things 2   Trouble relaxing 1   Being so restless that it is hard to sit still 1   Becoming easily annoyed or irritable 1   Feeling afraid as if something awful might happen 0   VANDANA-7 Total Score 9        Improved with addition of buspirone. Pt feels stable with current management. , continue current dosing 5 mg bid. Follow up 1 month. Will call psych to schedule. Subjective      Pt is a 30 yo female here for medication follow up regarding treatment of her anxiety and depression. At her last visit, noted improvement in her anxiety from 18 down to 13 after weaning down escitalopram to 5 mg and she was started on buspirone 5 mb bid. Her depression improved slightly from 19 to 15, her escitalopram had been decreased because she felt it was causing her to feel angry and agitated though her motivation level was still poor. She felt the decrease of the escitalopram to 5 mg did make her feel less angry and agitated but still felt unmotivated when last seen. The escitalopram was further weaned to 5 mg qod and her wellbutrin was increased to 300 mg. A referral was placed to psychiatry as well and she is on the wait list.  She feels like the changes to her medications have been good but she still gets down on Sundays when she starts to dread the work week. She does feel like that sense of dread is better during the week and her motivation is better. She says her sister is helping her find a therapist.      Review of Systems   Constitutional:  Positive for fatigue. Negative for chills and fever. Respiratory:  Negative for shortness of breath. Cardiovascular:  Negative for chest pain and palpitations.    Psychiatric/Behavioral:  Positive for dysphoric mood. Negative for decreased concentration, self-injury, sleep disturbance and suicidal ideas. The patient is nervous/anxious. Current Outpatient Medications on File Prior to Visit   Medication Sig    Biotin 10 MG CHEW Chew    buPROPion (WELLBUTRIN XL) 300 mg 24 hr tablet Take 1 tablet (300 mg total) by mouth every morning    busPIRone (BUSPAR) 5 mg tablet Take 1 tablet (5 mg total) by mouth 2 (two) times a day    Multiple Vitamins-Calcium (ONE-A-DAY WOMENS FORMULA PO) Take by mouth    [DISCONTINUED] escitalopram (LEXAPRO) 10 mg tablet TAKE ONE TABLET BY MOUTH EVERY DAY       Objective     /70 (BP Location: Left arm)   Pulse 93   Temp (!) 96.4 °F (35.8 °C) (Tympanic)   Resp 16   Ht 5' 4" (1.626 m)   Wt 89.8 kg (198 lb)   SpO2 99%   BMI 33.99 kg/m²     Physical Exam  Vitals reviewed. Constitutional:       General: She is awake. She is not in acute distress. Appearance: Normal appearance. She is well-developed and well-groomed. She is not ill-appearing or diaphoretic. Eyes:      General: Lids are normal.      Conjunctiva/sclera: Conjunctivae normal.   Pulmonary:      Effort: Pulmonary effort is normal.   Skin:     General: Skin is dry. Neurological:      Mental Status: She is alert and oriented to person, place, and time. Psychiatric:         Attention and Perception: Attention normal.         Mood and Affect: Mood normal.         Speech: Speech normal.         Behavior: Behavior normal. Behavior is cooperative. Thought Content:  Thought content normal.         Cognition and Memory: Cognition normal.         Judgment: Judgment normal.       COREY Chris

## 2023-11-22 NOTE — TELEPHONE ENCOUNTER
DISCHARGE LETTER for [facility] (certified and regular) placed in outgoing mail on 11/22/23.     Article #:  [id number]    Address:  [address] [place] [address]

## 2023-11-24 ENCOUNTER — TELEPHONE (OUTPATIENT)
Dept: PSYCHIATRY | Facility: CLINIC | Age: 28
End: 2023-11-24

## 2023-11-24 NOTE — TELEPHONE ENCOUNTER
Second outreach attempted and unsuccessful. Message left for patient to call back if interested in services. Referral is being closed at this time.

## 2023-12-03 DIAGNOSIS — F41.1 GENERALIZED ANXIETY DISORDER: ICD-10-CM

## 2023-12-03 DIAGNOSIS — F32.1 CURRENT MODERATE EPISODE OF MAJOR DEPRESSIVE DISORDER WITHOUT PRIOR EPISODE (HCC): ICD-10-CM

## 2023-12-04 RX ORDER — BUPROPION HYDROCHLORIDE 300 MG/1
300 TABLET ORAL EVERY MORNING
Qty: 30 TABLET | Refills: 5 | Status: SHIPPED | OUTPATIENT
Start: 2023-12-04 | End: 2024-06-01

## 2023-12-14 ENCOUNTER — TELEPHONE (OUTPATIENT)
Dept: PSYCHIATRY | Facility: CLINIC | Age: 28
End: 2023-12-14

## 2023-12-14 NOTE — TELEPHONE ENCOUNTER
Received response regarding referral letter via My-chart from patient, interested in  medication mgmt,[place], no preference.    Patient was added accordingly to wait-list.

## 2023-12-14 NOTE — TELEPHONE ENCOUNTER
----- Message from Alissa Dhillon sent at 2023  4:27 PM EST -----  Regarding: Psychiatric Referral  Contact: [phone]    I’m so sorry this is so late…. I hope I can still be put on the waitlist.        Patient Name and :  Alissa Dhillon 89     In need of services: Yes or No  Yes     Which services are needed: Therapy and/or Medication Management  Medication Management     Office location preference: [facility] or [address] (select all that apply)  [place]      Male/Female provider preference:   No preference     Current Address:   [facility] [address]    Phone#: [phone]     Best time to receive a call: after 4pm     Insurance Carrier: [employer]     Policy/ID#: [id number]     Group#: [id number] Phone#: [phone]     What is your current presenting problem?   Need help and guidance with medication management for major depressive disorder and generalized anxiety.

## 2024-01-12 DIAGNOSIS — G47.00 INSOMNIA, UNSPECIFIED TYPE: Primary | ICD-10-CM

## 2024-02-27 ENCOUNTER — OFFICE VISIT (OUTPATIENT)
Dept: SLEEP CENTER | Facility: CLINIC | Age: 29
End: 2024-02-27
Payer: COMMERCIAL

## 2024-02-27 VITALS
BODY MASS INDEX: 33.8 KG/M2 | DIASTOLIC BLOOD PRESSURE: 75 MMHG | HEIGHT: 64 IN | SYSTOLIC BLOOD PRESSURE: 118 MMHG | WEIGHT: 198 LBS

## 2024-02-27 DIAGNOSIS — G47.00 INSOMNIA, UNSPECIFIED TYPE: ICD-10-CM

## 2024-02-27 DIAGNOSIS — F51.5 NIGHTMARES: ICD-10-CM

## 2024-02-27 DIAGNOSIS — E61.1 IRON DEFICIENCY: Primary | ICD-10-CM

## 2024-02-27 DIAGNOSIS — R06.83 SNORING: ICD-10-CM

## 2024-02-27 DIAGNOSIS — E66.9 CLASS 1 OBESITY WITH BODY MASS INDEX (BMI) OF 33.0 TO 33.9 IN ADULT, UNSPECIFIED OBESITY TYPE, UNSPECIFIED WHETHER SERIOUS COMORBIDITY PRESENT: ICD-10-CM

## 2024-02-27 PROCEDURE — 99204 OFFICE O/P NEW MOD 45 MIN: CPT

## 2024-02-27 NOTE — PROGRESS NOTES
[facility]  Sleep Medicine New Patient Evaluation    PATIENT NAME: Raiza Vicente  DATE OF SERVICE: February 27, 2024    CONSULTING PROVIDER:  COREY Krueger  [address]  [place] [address]    ASSESSMENT/PLAN:  Raiza Vicente is a 28 y.o. female with PMHx of depression, anxiety and obesity who presents for sleep evaluation.    Snoring  Excessive Daytime Sleepiness  Obesity  - The patient reports symptoms concerning for DARIEN including snoring, frequent nocturnal awakenings, gasping/choking in sleep, waking with dry mouth and morning headaches and excessive daytime sleepiness.  STOP-BANG during today's visit was 2.  - Will obtain HSAT, informed the patient that if HSAT is negative a PSG would be recommended.  - Discussed with the patient the possible diagnosis, causes and conditions associated with SDB along with potential treatments.  - Encouraged healthy lifestyle with adequate sleep (7-9 hours per night), healthy balanced diet and routine exercise.  Weight loss is recommended.  - Follow-up 2 to 3 weeks after sleep study  -     Home Study; Future    Nocturnal Leg Kicking  Iron Deficiency  - The patient reports frequent nocturnal leg kicks, but does not have symptoms that correlate with RLS.  It is possible this is related to potential DARIEN as noted above or she could have PLMD.  She has a history of ferritin <75 ng/mL in 2014, but there is no more recent iron studies.  - Will check AM fasting iron studies.  - Should ferritin < 75 ng/mL or %sat < 20% will plan to proceed with PO iron supplementation with 65mg elemental iron BID +/- vitamin C.  -     Iron Panel (Includes Ferritin, Iron Sat%, Iron, and TIBC); Future    Chronic Sleep-Onset and Maintenance Insomnia  Delayed Sleep Tendencies  - The patient reports difficulty falling and staying asleep.  Suspect the patient's sleep onset insomnia is largely due to psychophysiologic factors as it worsens in connection to  worsening depression and anxiety when she is off from school.  However, there are behavioral factors such as lack of stimulus control and getting into bed before she is truly sleepy at night.  She has some delayed sleep-wake tendencies with the patient reporting she does not feel sleepy until at least 2300 and is able to fall asleep easier on the weekends when she goes to bed later.  She also has symptoms of frequent leg kicking at night and DARIEN which may be contributing to her multiple awakenings.  Workup for these as above.  - Current medication regimen includes: melatonin 5mg qHS PRN.  - Discussed the importance of stimulus control and sleep hygiene.  Recommend she not get into bed until truly sleepy, leave the bed at night when she is unable to sleep and perform non-stimulating activities until sleepy again.  As she is not routinely getting up until 7092-1485 during the week we discussed moving bedtime to 7205-9727 which will still allow for 7 hours of sleep, but will be after she begins to feel truly sleepy.  - Avoid the bed/bedroom during the day time.  - Will refer for CBT-I.  -     Ambulatory Referral to Sleep Medicine    Nightmares  - The patient technically does not meet the definition of nightmare disorder at this time as it does not appear her sleep is largely interrupted due to the nightmares themselves and currently they are only happening once a week.  It seems to be largely connected to the status of her depression/anxiety.  She is currently awaiting an appointment with psychiatry for further evaluation for depression and anxiety.  Will hold off on treatment at this time, plan to address the above issues and reassess.    Thank you for allowing me to participate in the care of your patient.  If there are any questions regarding evaluation please feel free to reach out.   ________________________________________________________________________________________________    HPI: Raiza Vicente is a 28 y.o.  female with PMHx of depression, anxiety and obesity who presents for sleep evaluation.  Sleep-Related History: No prior sleep studies or sleep consults.    The patient was referred by her PCP due to complaints of insomnia.  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  She notes on weekdays it takes her longer to fall asleep and then does on weekends; however, she still struggles on the weekends at times.  She also notes she will fall asleep easier during days where she has been busier than on days when she has not done much during the waking hours.  She is not always sleepy when she gets into bed at night, but rather gets into bed due to a sense of fatigue.  She reports she generally does not start to feel sleepy until after 2300.  She does report snoring, gasping and choking in sleep and waking up with morning headaches feeling unrefreshed.  She has never had any sleep studies in the past, and denies witnessed apneas, SP, HH and cataplexy.  She does note nightmares, additional information below.  She denies symptoms consistent with RLS; however, does report being told that she kicks frequently at night.    She denies having an urge to move the legs in the evening (when resting) nor uncomfortable and/or unpleasant sensations in the legs. She has been told that she kicks her legs during sleep.  While she denies the urge to move her legs, she does note twitching/jerking motions in her legs at times.  She does have a history of a ferritin of 39 ng/mL in 2014, but full iron studies are not available.    She reports nightmares, but denies any other parasomnias:  - Frequency: 1 times per week currently, 5x per week in the summer (not working), starting 4 years ago.  - Timing: last 1/3 of the night  - Dream Content: unclear, reports sensation of the dreams being scary in nature and sometimes she dreams  about her anxieties  - Precipitating Events: none that she can identify  - H/O PTSD: No  - Other: She denies frequently waking up from the nightmares, states she can remember the dreams being scary in the morning.  These worsen as her insomnia/depression/anxiety worsened during the summer when she is off from work.    Insomnia Checklist  Timing: onset and maintenance  Current Medications: melatonin 5mg PRN  Prior Medications: none  Difficulty falling back to sleep: No   Anxiety/Rumination: Yes, will have anxious thoughts about work and the next day or her to do list    Pre-Bed Routine: not-consistent  Awake in bed > 20 min: Yes, will lay in bed awake the entire time she is trying to fall asleep    In bed during the day: Not during the school year, but at times in the summer  Screen use in bed: No -- has tried to minimize screen use in bed  Exercise within 4 hours of bed: No   Consistent meal times: Yes   Late night snacking: No  Caffeine: Yes, 1 cup of coffee in the morning  Clock Watching: No    ESS: Total score: 9/24  Greater or equal to 10 is positive for excessive daytime sleepiness  Pertinent Meds: melatonin 5mg (causes AM grogginess, takes ~1x a month)    Sleep-Wake Schedule:  She is self-described as a morning person.  Bedtime: 2414-6103  Wake Time: 4543-3106  Difficulty Falling Asleep: Yes, 3-4x nights it will take 1-2 hours  Avg Number of Awakenings: 1-4x a night  Cause of Awakenings: sometimes wakes up with anxiety and axious thoughts  Weekend Sleep Schedule: 2300/0000 and wakes up between 0900/1000  Naps: not during the school year, but during the summer when she's off she will lay in bed for long periods of time, notes she struggles with depression    Avg TST per 24 hours: 5.5-7 hours    Sleep-Related Details:  Preferred Sleep Position: prone and side(s)  SDB Symptoms: snoring, gasping/choking, multiple awakenings, morning headaches, dry mouth/nose, and waking unrefreshed  Bruxism: Yes, has a nightguard  which she wears  Nocturnal GERD: No  Nocturnal Palpitations: No  Nocturnal Anxiety or Rumination: Yes, see above  Sleep-Related Hallucinations: No  Sleep Paralysis: No  Cataplexy: No    Wake-Related Details  Daytime Sleepiness: Yes, mixture of both fatigue and sleepiness during the day    Work Schedule: teacher, daytime hours, off in the summer which worsens her sleep schedule  Drowsy Driving: No  Caffeine: Yes, 1 cup of coffee in the morning  Alcohol Use: Yes, social use  Substance Use: No  Tobacco Use: No  Weight Change: has gained ~30lbs in the last 2 years which has come with an increase in snoring    She does have difficulty with memory or concentration.    Past Treatments:  None    Prior Sleep Studies:  None    Other Relevant Labs and Studies:  None    Past Medical History:   Diagnosis Date    COVID-19 1/27/2021    Depression     Exposure to influenza 2/1/2018     Past Surgical History:   Procedure Laterality Date    NO PAST SURGERIES       Patient Active Problem List   Diagnosis    Current moderate episode of major depressive disorder without prior episode (HCC)    Generalized anxiety disorder    Annual physical exam     Allergies as of 02/27/2024    (No Known Allergies)     REVIEW OF SYSTEMS:  Review of Systems  10-point system review completed, all of which are negative except as mentioned above.    CURRENT MEDICATIONS:  Current Outpatient Medications   Medication Instructions    Biotin 10 MG CHEW Oral    buPROPion (WELLBUTRIN XL) 300 mg, Oral, Every morning    busPIRone (BUSPAR) 5 mg, Oral, 2 times daily    Multiple Vitamins-Calcium (ONE-A-DAY WOMENS FORMULA PO) Oral     SOCIAL HISTORY:  Social History     Tobacco Use    Smoking status: Never     Passive exposure: Never    Smokeless tobacco: Never   Vaping Use    Vaping status: Never Used   Substance Use Topics    Alcohol use: Yes    Drug use: No     FAMILY HISTORY:  Family History   Problem Relation Age of Onset    No Known Problems Mother     Diabetes  "Father     Cervical cancer Sister     No Known Problems Sister     No Known Problems Sister     No Known Problems Sister     No Known Problems Sister     No Known Problems Brother      Family History of Sleep Disorders: father snores loudly, but is not formally diagnosed    PHYSICAL EXAMINATION:  Vital Signs:  /75 (BP Location: Left arm, Patient Position: Sitting, Cuff Size: Large)   Ht 5' 4\" (1.626 m)   Wt 89.8 kg (198 lb)   BMI 33.99 kg/m²   Body mass index is 33.99 kg/m².    Constitutional: NAD, well appearing   Mental Status: AAOx3  Neck Circumference: Neck Circumference: 13.25 inches  Skin: Warm, dry, no rashes noted   Eyes: PERRL, normal conjunctiva  ENT: Nasal congestion absent, nasal valve incompetence absent.  Posterior Airspace:   Cervantes Tongue Position: 3  Retrognathia: absent  Overbite: absent  High Arched Palate: absent  Tongue Scalloping/Ridging: present  Tonsils: 1+  Uvula: normal  Chest: RRR, +S1/S2, CTA B/L, no W/R/R, no M/R/G   Abdomen: Soft, NT/ND  Extremities: No digital clubbing or pedal edema    I have spent a total time of 45 minutes on 02/27/24 in caring for this patient including Instructions for management, Patient and family education, Importance of tx compliance, Counseling / Coordination of care, Documenting in the medical record, Reviewing / ordering tests, medicine, procedures  , and Obtaining or reviewing history  .    Brigitte Jain MD  Pulmonary-Critical Care and Sleep Medicine  02/27/24    Portions of the record may have been created with voice recognition software. Occasional wrong word or \"sound a like\" substitutions may have occurred due to the inherent limitations of voice recognition software. Please read the chart carefully and recognize, using context, where substitutions have occurred.   "

## 2024-02-27 NOTE — PATIENT INSTRUCTIONS
- An at home sleep study has been ordered to evaluate for sleep apnea.  This test should be scheduled at your earliest convenience.  - Referral to CBT-I (cognitive behavioral therapy for insomnia)  - Please have your iron labs completed.  You should not take any iron supplements for the 2 days before having these labs done, try and have the labs drawn in the morning while fasting.  - Please schedule a follow up visit for 2-3 weeks after your sleep study.    - You may have sleep apnea.  Sleep apnea is a serious sleep disorder that occurs when a person's breathing is interrupted during sleep.  People with untreated sleep apnea stop breathing repeatedly during sleep.  - The most common type of sleep apnea is obstructive sleep apnea.  - If left untreated, obstructive sleep apnea can result in a number of health problems including high blood pressure, stroke, irregular heart rhythms, heart failure, diabetes, obesity and heart attacks.  - Treatment options for obstructive sleep apnea may include positive airway pressure (PAP) therapy, oral appliances, hypoglossal nerve stimulator, nose/throat surgery, weight loss, side sleeping or avoidance of medications or substances that can relax the airway muscles (alcohol, benzodiazepines and opioids).  - Avoid driving is drowsy.  It is recommended that if you are dozing while driving that you do not drive until your sleepiness is appropriately treated.  - It is important to lead a healthy lifestyle with adequate sleep (7-9 hours per night), balanced diet and routine exercise.

## 2024-03-11 ENCOUNTER — TELEPHONE (OUTPATIENT)
Dept: OTHER | Facility: OTHER | Age: 29
End: 2024-03-11

## 2024-03-11 NOTE — TELEPHONE ENCOUNTER
Pt called in to follow on the status of the wait list.     Please give pt a call back during these hours.   9:45 am - 10:30 am   11:25 am - 11:45 am   After 3:45 pm

## 2024-03-12 NOTE — TELEPHONE ENCOUNTER
Patient called requesting a call back before 10:30am due to work. Writer informed patient the message would be sent for a return call.

## 2024-03-17 ENCOUNTER — PATIENT MESSAGE (OUTPATIENT)
Dept: OBGYN CLINIC | Facility: CLINIC | Age: 29
End: 2024-03-17

## 2024-03-27 ENCOUNTER — OFFICE VISIT (OUTPATIENT)
Dept: OBGYN CLINIC | Facility: CLINIC | Age: 29
End: 2024-03-27
Payer: COMMERCIAL

## 2024-03-27 VITALS
SYSTOLIC BLOOD PRESSURE: 106 MMHG | DIASTOLIC BLOOD PRESSURE: 70 MMHG | WEIGHT: 185.4 LBS | HEIGHT: 65 IN | BODY MASS INDEX: 30.89 KG/M2

## 2024-03-27 DIAGNOSIS — F32.1 CURRENT MODERATE EPISODE OF MAJOR DEPRESSIVE DISORDER WITHOUT PRIOR EPISODE (HCC): ICD-10-CM

## 2024-03-27 DIAGNOSIS — N94.3 PMS (PREMENSTRUAL SYNDROME): Primary | ICD-10-CM

## 2024-03-27 DIAGNOSIS — F41.1 GENERALIZED ANXIETY DISORDER: ICD-10-CM

## 2024-03-27 PROCEDURE — 99214 OFFICE O/P EST MOD 30 MIN: CPT | Performed by: OBSTETRICS & GYNECOLOGY

## 2024-03-27 RX ORDER — LEVONORGESTREL AND ETHINYL ESTRADIOL 0.15-0.03
1 KIT ORAL DAILY
Qty: 84 TABLET | Refills: 1 | Status: SHIPPED | OUTPATIENT
Start: 2024-03-27

## 2024-03-27 NOTE — PROGRESS NOTES
Assessment/Plan:  Discussed signs and symptoms of PMS/PMDD.  Patient higher risk given history of depression and anxiety.  Discussed treatment options to suppress menses, improve PMS symptoms.  She tolerated OCPs in the past.  At this point we will use continuous OCPs with withdrawal bleed every 3 to 4 months.  She will start Seasonale x 6 months.  She will keep a menstrual diary.  Continue follow-up with psychiatry/therapist.  Return to office in 4 to 5 months for annual visit and follow-up or as needed  No problem-specific Assessment & Plan notes found for this encounter.       Diagnoses and all orders for this visit:    PMS (premenstrual syndrome)  -     levonorgestrel-ethinyl estradiol (Jolessa) 0.15-0.03 MG per tablet; Take 1 tablet by mouth daily    Current moderate episode of major depressive disorder without prior episode (HCC)    Generalized anxiety disorder          Subjective:      Patient ID: Raiza Vicente is a 28 y.o. female.    HPI    This is a pleasant 28-year-old female G0 presents complaining of PMS symptoms.  She does have a history of anxiety and depression.  She was evaluated by her family physician 9/2023.  Lexapro was discontinued and she started Wellbutrin followed by BuSpar with titrating doses.  She states prior to menses she gets significant extreme depression, crying spells occurring approximately 2 days prior to menses and continuing the next couple of days into her cycle.  Her cycles are approximately every 4 to 5 weeks lasting 5 days with no breakthrough bleeding.  She denies any dysmenorrhea.  She has discussed these issues with her therapist and primary care physician.  She was referred to psychiatry with appointment 5/2022.    She will be getting  this summer.  Her partner is very supportive.  She is working full-time  for 7 years.  She does find her job stressful at times.        The following portions of the patient's history were reviewed and updated as  "appropriate: allergies, current medications, past family history, past medical history, past social history, past surgical history, and problem list.    Review of Systems   Constitutional:  Negative for fatigue, fever and unexpected weight change.   Respiratory:  Negative for cough, chest tightness, shortness of breath and wheezing.    Cardiovascular: Negative.  Negative for chest pain and palpitations.   Gastrointestinal: Negative.  Negative for abdominal distention, abdominal pain, blood in stool, constipation, diarrhea, nausea and vomiting.   Genitourinary: Negative.  Negative for difficulty urinating, dyspareunia, dysuria, flank pain, frequency, genital sores, hematuria, pelvic pain, urgency, vaginal bleeding, vaginal discharge and vaginal pain.   Skin:  Negative for rash.         Objective:      /70   Ht 5' 5\" (1.651 m)   Wt 84.1 kg (185 lb 6.4 oz)   LMP 03/16/2024 (Exact Date)   BMI 30.85 kg/m²          Physical Exam      "

## 2024-04-21 DIAGNOSIS — F41.1 GENERALIZED ANXIETY DISORDER: ICD-10-CM

## 2024-04-22 RX ORDER — BUSPIRONE HYDROCHLORIDE 5 MG/1
5 TABLET ORAL 2 TIMES DAILY
Qty: 60 TABLET | Refills: 5 | Status: SHIPPED | OUTPATIENT
Start: 2024-04-22

## 2024-04-24 ENCOUNTER — HOSPITAL ENCOUNTER (OUTPATIENT)
Dept: SLEEP CENTER | Facility: CLINIC | Age: 29
Discharge: HOME | End: 2024-04-24
Payer: COMMERCIAL

## 2024-04-24 DIAGNOSIS — R06.83 SNORING: ICD-10-CM

## 2024-04-24 PROCEDURE — G0399 HOME SLEEP TEST/TYPE 3 PORTA: HCPCS

## 2024-04-24 NOTE — PROGRESS NOTES
Home Sleep Study Documentation    HOME STUDY DEVICE: Noxturnal no                                           Roya G3 yes      Pre-Sleep Home Study:    Set-up and instructions performed by: VIANNEY Romo    Technician performed demonstration for Patient: yes    Return demonstration performed by Patient: yes    Written instructions provided to Patient: yes    Patient signed consent form: yes        Post-Sleep Home Study:    Additional comments by Patient: pending    Home Sleep Study Failed:pending    Failure reason: pending    Reported or Detected: pending    Scored by: pending

## 2024-04-29 PROBLEM — G47.33 OSA (OBSTRUCTIVE SLEEP APNEA): Status: ACTIVE | Noted: 2024-04-29

## 2024-05-02 PROBLEM — R06.83 SNORING: Status: ACTIVE | Noted: 2024-05-02

## 2024-05-02 PROCEDURE — G0399 HOME SLEEP TEST/TYPE 3 PORTA: HCPCS | Performed by: INTERNAL MEDICINE

## 2024-05-03 DIAGNOSIS — R06.83 SNORING: Primary | ICD-10-CM

## 2024-05-03 DIAGNOSIS — G47.34 NOCTURNAL HYPOXEMIA: ICD-10-CM

## 2024-05-03 DIAGNOSIS — G47.19 EXCESSIVE DAYTIME SLEEPINESS: ICD-10-CM

## 2024-05-07 ENCOUNTER — TELEPHONE (OUTPATIENT)
Dept: SLEEP CENTER | Facility: CLINIC | Age: 29
End: 2024-05-07

## 2024-05-21 NOTE — PSYCH
"Psychiatric Evaluation - Behavioral Health     Identification Data:Raiza Vicente 28 y.o. female MRN: [id number]    Chief Complaint: \"I have been in a low state.\"    History of present illness:     Raiza is a 28 y.o. female with a history of major depressive disorder, generalized anxiety disorder, who presents for psychiatric evaluation to establish care for management of depression and anxiety. Pt was previously seeing therapist Ekta Bryan, more recently she had seen Kirsten Carmichael for therapy but she continues with a private therapist now.  She continues on Wellbutrin  mg once daily and Buspar 5 mg BID. Patient was seen in [place] about 2 years ago by Fernando William NP and had a [employer] testing done, patient states there was no follow-up scheduled after that and she continued with her family doctor.     We discussed her symptoms as below.     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 Her family mostly lives in the [facility].     Depression: Pt feels her mood was low starting in high school and college. After she graduated college, she felt it \"kept getting worse\" and she sought help from a therapist. She went on Zoloft after this. She experiences \"highs and lows\" with her mood. The past 2 years she has been in a \"low state\" with persistent sadness, low motivation more prominent starting four months ago with difficulty getting out of bed and difficulty getting out of the house. She also endorses anhedonia. It takes her a long time to fall asleep and she sleeps 5-6 hours total with some nighttime awakenings, she sometimes has \"stressful dreams\" that wake her up. She can generally fall back asleep easily however, and she denies early awakenings. She sleeps in on the weekends, 8-10 hours total " "of sleep on weekends. She feels in the last 2 years her appetite has been elevated. She has intermittent SI, last time was 2 Sundays ago before a breakthrough menstrual period on her oral contraceptive. Her SI thoughts are passive, such as \"I am so tired, I cannot imagine a life like this\". Most recently, she seems to experience SI before her periods, on average about once a month.     She feels her irritability has gotten worse in the past 2 years - irritability consists of feeling she has less patience, especially at her job with the kids. Wellbutrin XL and Buspar were started for her by her PCP, Wellbutrin was started around 2023 and Buspar was added around 2023. She is not sure if they have been helpful.    PMDD: Pt states her gynecologist diagnosed the patient with PMDD. She states she realized in 2024 her SI and severe depressive thoughts would worsen 2 days before her menstrual period. She was placed on oral contraceptive intended to give 4 menstrual periods a year.     Anxiety: Pt feels that she ruminates \"all the time\", she states my \"mind feels like a warzone\" - she feels she was always aware of other's feelings, but it has \"morphed\" into anxiety around high school. She feels she has been socially anxious in the last year only, she dreads seeing friends at times. She ruminates about things she says to people and what they think of her. She worries about work, relationships.     We discussed medication management.     Current Rating Scores:   Current PHQ-9   PHQ-2/9 Depression Screening    Little interest or pleasure in doing things: 1 - several days  Feeling down, depressed, or hopeless: 1 - several days  Trouble falling or staying asleep, or sleeping too much: 1 - several days  Feeling tired or having little energy: 1 - several days  Poor appetite or overeatin - several days  Feeling bad about yourself - or that you are a failure or have let yourself or your family down: 2 - " more than half the days  Trouble concentrating on things, such as reading the newspaper or watching television: 1 - several days  Moving or speaking so slowly that other people could have noticed. Or the opposite - being so fidgety or restless that you have been moving around a lot more than usual: 1 - several days  Thoughts that you would be better off dead, or of hurting yourself in some way: 0 - not at all  PHQ-9 Score: 9  PHQ-9 Interpretation: Mild depression       She denies any side effects from medications. Discussed with pt replacing Buspar 5 mg BID with Pristiq, we reviewed her [employer] testing and this is one of the medications that may work well for her. Discussed this can be safely taken with Wellbutrin and we will monitor her blood pressure, which has been very good. Discussed side effects, risks, indications, and pt was agreeable to try this.     Psychiatric Review Of Systems:    Sleep changes: Yes, as above  Appetite changes: Yes, as above  Weight changes: Yes, as above  Energy/anergy: Decreased, as above  Interest/pleasure/anhedonia: Decreased, as above  Guilty/hopeless: None noted  Somatic symptoms: None noted  Anxiety/panic: Yes, as above  Manic symptoms: Pt denies symptoms. She does have irritability as above however no other symptoms.   Auditory hallucinations: Pt denies   Visual hallucinations: Pt denies   Other hallucinations: Pt denies   Delusional thinking: Pt denies   Eating disorder history: Pt denies   Obsessive/compulsive symptoms: She used to adhere strictly to lists and this was a source of negative thoughts in the past. She sometimes checks locks 2-3 times. No other symptoms.  Self injurious behavior/risky behavior: Pt denies    Suicidal ideation: Pt denies current, she has occasional passive SI.   Homicidal ideation: Pt denies    Medical Review Of Systems:    Constitutional negative   ENT negative   Cardiovascular negative   Respiratory negative   Gastrointestinal negative    Genitourinary negative   Musculoskeletal negative   Integumentary negative   Neurological negative   Endocrine negative   Other Symptoms none, all other systems are negative       Past Psychiatric History:     Past Inpatient Psychiatric Treatment: Pt denies  Past Outpatient Psychiatric Treatment: Pt saw Fernando William NP in 2021 for psychiatric care. She continued with her family doctor.   Past Suicide Attempts: Pt denies  Past Self-harm: Pt denies  Past Violent Behavior: Pt denies  Past Psychiatric Medication Trials: Lexapro 10 mg (felt not effective, tried for several months), Zoloft (had emotional blunting), Wellbutrin, Buspar    Substance Abuse History:    Alcohol use: Pt drinks alcohol socially, once a week, 2-3 drinks  Nicotine use: Pt denies  Caffeine use: Pt denies  Other recreational drug use: Pt denies    Longest clean time: N/A  History of Inpatient/Outpatient rehabilitation program: N/A    Family Psychiatric History:     Psychiatric Illness:  Three sisters - depression, anxiety     Paternal uncle - bipolar disorder     Father's side - some question of psychosis  Substance Abuse:  Maternal uncle - alcohol abuse  Suicide Attempts:  Pt denies    Social History:    Education: Pt has her Master's in educational leadership, she went to [employer]  Learning Disabilities: Pt denies  Developmental: No problems with development  Marital History: Engaged to be  on June 19 2024  Children: None  Living Arrangement: Pt lives with her fiance at home  Occupational History: Pt works as a  for past 7 years  Functioning Relationships: Pt can talk to her fiance, her sisters, friends, mother. She has 5 sisters, three live in the area, one in [place], one in [place]  Legal History: None noted   History: None noted  Access to Firearms: Pt denies    Traumatic History:     Abuse: Pt denies  Other Traumatic Events: None noted    Past Medical History:    History of Seizures: Pt  "denies  History of Head injury with loss of consciousness: In highschool had LOC with concussion while playing field hockey - was out of school 6-8 months with photosensitivity    Past Medical History:   Diagnosis Date    COVID-19 1/27/2021    Depression     Exposure to influenza 2/1/2018     Past Surgical History:   Procedure Laterality Date    NO PAST SURGERIES       No Known Allergies    History Review: The following portions of the patient's history were reviewed and updated as appropriate: allergies, current medications, past family history, past medical history, past social history, past surgical history, and problem list.    OBJECTIVE:    Vital signs in last 24 hours:    Vitals:    05/22/24 0937   BP: 115/79   Pulse: 84   Weight: 80.6 kg (177 lb 9.6 oz)   Height: 5' 5\" (1.651 m)        Mental Status Evaluation:    Appearance age appropriate, casually dressed   Behavior cooperative, calm   Speech normal rate, normal volume, normal pitch   Mood euthymic   Affect normal range and intensity, appropriate   Thought Processes organized, goal directed   Associations intact associations   Thought Content no overt delusions   Perceptual Disturbances: no auditory hallucinations, no visual hallucinations   Abnormal Thoughts  Risk Potential Suicidal ideation - None  Homicidal ideation - None  Potential for aggression - No   Orientation oriented to person, place, time/date, and situation   Memory recent and remote memory grossly intact   Consciousness alert and awake   Attention Span Concentration Span attention span and concentration are age appropriate   Intellect appears to be of average intelligence   Insight intact   Judgement intact   Muscle Strength and  Gait normal muscle strength and normal muscle tone, normal gait and normal balance   Motor Activity no abnormal movements   Language No aphasia   Fund of Knowledge adequate knowledge of current events  adequate fund of knowledge regarding past history  adequate fund " "of knowledge regarding vocabulary    Pain none   Pain Scale 0       Laboratory Results: I have personally reviewed all pertinent laboratory/tests results    CBC:   Lab Results   Component Value Date    WBC 7.06 07/19/2022    RBC 4.79 07/19/2022    HGB 14.9 07/19/2022    HCT 44.2 07/19/2022    MCV 92 07/19/2022     07/19/2022    MCH 31.1 07/19/2022    MCHC 33.7 07/19/2022    RDW 11.9 07/19/2022    MPV 8.8 (L) 07/19/2022    NEUTROABS 4.21 07/19/2022     BMP:   Lab Results   Component Value Date    SODIUM 138 07/19/2022    K 3.9 07/19/2022     07/19/2022    CO2 27 07/19/2022    AGAP 6 07/19/2022    BUN 5 07/19/2022    CREATININE 0.55 (L) 07/19/2022    GLUC 93 07/19/2022    GLUF 83 01/24/2022    CALCIUM 8.4 07/19/2022    EGFR 128 07/19/2022     CMP:   Lab Results   Component Value Date    SODIUM 138 07/19/2022    K 3.9 07/19/2022     07/19/2022    CO2 27 07/19/2022    AGAP 6 07/19/2022    BUN 5 07/19/2022    CREATININE 0.55 (L) 07/19/2022    GLUC 93 07/19/2022    GLUF 83 01/24/2022    CALCIUM 8.4 07/19/2022    AST 26 07/19/2022    ALT 14 07/19/2022    ALKPHOS 50 07/19/2022    TP 7.1 07/19/2022    ALB 3.7 07/19/2022    TBILI 0.31 07/19/2022    EGFR 128 07/19/2022     Lipid Profile:   Lab Results   Component Value Date    HDL 45 07/27/2015    TRIG 108 07/27/2015    LDLCALC 103 (H) 07/27/2015     Hemoglobin A1C: No results found for: \"HGBA1C\", \"EAG\"  Liver Enzymes:   Lab Results   Component Value Date    AST 26 07/19/2022    ALT 14 07/19/2022    ALKPHOS 50 07/19/2022    PROT 7.3 07/27/2015    BILITOT 0.17 (L) 07/27/2015     Thyroid Studies:   Lab Results   Component Value Date    CDA0LHOBAECZ 0.944 01/24/2022    FREET4 1.1 04/21/2014     Vitamin D Level No results found for: \"KYGV05ETRDOZ\", \"EADQ853VNUW\"  Vitamin B12 No results found for: \"JTTOELAG07\"  EKG No results found for: \"VENTRATE\", \"ATRIALRATE\", \"PRINT\", \"QRSDINT\", \"QTINT\", \"PAXIS\", \"QRSAXIS\", \"TWAVEAXIS\"    Suicide/Homicide Risk " Assessment:    Risk of Harm to Self:  The following ratings are based on assessment at the time of the interview  Demographic risk factors include:   Historical Risk Factors include: history of depression, history of anxiety  Recent Specific Risk Factors include: mental illness diagnosis, experienced fleeting suicidal ideation  Protective Factors: no current suicidal ideation, ability to adapt to change, access to mental health treatment, compliant with medications, compliant with mental health treatment, responsibilities and duties to others, restricted access to lethal means, stable living environment, stable job, supportive family, supportive friends  Weapons: none. The following steps have been taken to ensure weapons are properly secured: not applicable  Based on today's assessment, Raiza presents the following risk of harm to self: minimal    Risk of Harm to Others:  The following ratings are based on assessment at the time of the interview  Demographic Risk Factors include: none.  Historical Risk Factors include: none.  Recent Specific Risk Factors include: none.  Protective Factors: no current homicidal ideation, ability to adapt to change, access to mental health treatment, compliant with medications, compliant with mental health treatment, responsibilities and duties to others, restricted access to lethal means, stable living environment, stable job, supportive family, supportive friends  Weapons: none. The following steps have been taken to ensure weapons are properly secured: not applicable  Based on today's assessment, Raiza presents the following risk of harm to others: minimal    The following interventions are recommended: no intervention changes needed    Assessment/Plan:      Diagnoses and all orders for this visit:    Current moderate episode of major depressive disorder without prior episode (HCC)  -     buPROPion (WELLBUTRIN XL) 300 mg 24 hr tablet; Take 1 tablet (300 mg total) by  mouth every morning  -     desvenlafaxine succinate 25 MG TB24; Take 1 tablet (25 mg total) by mouth daily Can increase to 2 tablets once daily after 7 days if tolerated well.    Generalized anxiety disorder  -     buPROPion (WELLBUTRIN XL) 300 mg 24 hr tablet; Take 1 tablet (300 mg total) by mouth every morning  -     desvenlafaxine succinate 25 MG TB24; Take 1 tablet (25 mg total) by mouth daily Can increase to 2 tablets once daily after 7 days if tolerated well.          Treatment Recommendations/Precautions:    - Continue Wellbutrin  mg once daily for depressed mood  - Start Pristiq 25 mg for one week, then increase to 50 mg thereafter, for depressed mood, anxiety    Patient to follow-up in 1 month.     - Aware of 24 hour and weekend coverage for urgent situations accessed by calling [facility] main practice number  - Risks, benefits, and possible side effects of medications explained to Raiza and she verbalizes understanding and agreement for treatment.  - Raiza has been filling controlled prescriptions on time as prescribed according to [place] Prescription Drug Monitoring Program    This note was not shared with the patient due to this is a psychotherapy note    Visit Time    Visit Start Time: 9:00 AM   Visit Stop Time: 10:00 AM   Total Visit Duration:  60 minutes    Vee Chicas MD 05/22/24      This note has been constructed in part using a voice recognition system.   There may be translation, syntax, or grammatical errors. If you have any questions, please contact the dictating provider.

## 2024-05-22 ENCOUNTER — OFFICE VISIT (OUTPATIENT)
Dept: PSYCHIATRY | Facility: CLINIC | Age: 29
End: 2024-05-22

## 2024-05-22 VITALS
WEIGHT: 177.6 LBS | BODY MASS INDEX: 29.59 KG/M2 | DIASTOLIC BLOOD PRESSURE: 79 MMHG | HEIGHT: 65 IN | SYSTOLIC BLOOD PRESSURE: 115 MMHG | HEART RATE: 84 BPM

## 2024-05-22 DIAGNOSIS — F33.1 MDD (MAJOR DEPRESSIVE DISORDER), RECURRENT EPISODE, MODERATE (HCC): Primary | ICD-10-CM

## 2024-05-22 DIAGNOSIS — F41.1 GENERALIZED ANXIETY DISORDER: ICD-10-CM

## 2024-05-22 RX ORDER — BUPROPION HYDROCHLORIDE 300 MG/1
300 TABLET ORAL EVERY MORNING
Qty: 30 TABLET | Refills: 2 | Status: SHIPPED | OUTPATIENT
Start: 2024-05-22 | End: 2024-11-18

## 2024-05-22 RX ORDER — DESVENLAFAXINE 25 MG/1
25 TABLET, EXTENDED RELEASE ORAL DAILY
Qty: 60 TABLET | Refills: 2 | Status: SHIPPED | OUTPATIENT
Start: 2024-05-22

## 2024-05-22 NOTE — BH TREATMENT PLAN
TREATMENT PLAN (Medication Management Only)        [facility]    Name/Date of Birth/MRN:  Raiza Vicente 28 y.o. 1995 MRN: [id number]  Date of Treatment Plan: May 22, 2024  Diagnosis/Diagnoses:   1. MDD (major depressive disorder), recurrent episode, moderate (HCC)    2. Generalized anxiety disorder      Strengths/Personal Resources for Self-Care: supportive family, supportive friends, taking medications as prescribed, ability to communicate needs, willingness to work on problems.  Area/Areas of need (in own words): anxiety, depression.  1. Long Term Goal:   improve anxiety, improve depression  Target Date: 6 months - 11/22/2024  Person/Persons responsible for completion of goal: Raiza  2.  Short Term Objective (s) - How will we reach this goal?:   A.  Provider new recommended medication/dosage changes and/or continue medication(s): continue current medications as prescribed.  B.  N/A.  C.  N/A.  Target Date: 2 months - 7/22/2024  Person/Persons Responsible for Completion of Goal: Raiza   Progress Towards Goals: continuing treatment  Treatment Modality: medication management every 1-3 months  Review due 180 days from date of this plan: 6 months - 11/22/2024  Expected length of service: ongoing treatment unless revised  My Physician/PA/NP and I have developed this plan together and I agree to work on the goals and objectives. I understand the treatment goals that were developed for my treatment.  Electronic Signatures: on file (unless signed below)    Vee Cihcas MD 05/22/24

## 2024-07-07 NOTE — PSYCH
MEDICATION MANAGEMENT NOTE        [facility]      Name and Date of Birth:  Raiza Vicente 28 y.o. 1995 MRN: [id number]    Date of Visit: July 8, 2024    Subjective:    Patient was seen for follow-up examination and the chart was reviewed.  She continues to see her own private therapist.  Patient today discussed fears regarding returning to her job as a  and discussed some behaviors the kids exhibit that make it difficult for her to do her job, including some physical aggression. She discussed she [place] and during the last 2 to 3 days of the trip was very depressed, sad, crying, anxious about returning back to school and experiencing anticipatory anxiety.  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  She discussed that over time she feels she has lost her passion for the field and is not sure what further direction to take.  We discussed her emotions as unpleasant but also as motivating.    We discussed medication management.  She has been taking the Pristiq 50 mg for about a week now, she took the 25 mg for a few weeks as she did not want to change the dose while she was away [place] vacation in [place].  She discussed at length feeling that this past year she has been more irritable, we discussed that Wellbutrin could have no effect on anxiety, could increase anxiety also.  We discussed patient should continue on the Pristiq 50 mg dose for about 4 to 5 more weeks to let this take better effect, and on September 5th patient may decrease to Wellbutrin  mg - we will meet a couple of weeks after this decrease, pt will be attending orientation for the school year.     She denies any side effects from medications.    Psychiatric Review Of Systems:     Sleep changes: no  Appetite changes: no  Auditory hallucinations: no  Visual hallucinations: no  Delusional  thinking: no    Suicidal ideation: no  Homicidal ideation: no    Medical Review Of Systems:    Constitutional negative   ENT negative   Cardiovascular negative   Respiratory negative   Gastrointestinal negative   Genitourinary negative   Musculoskeletal negative   Integumentary negative   Neurological negative   Endocrine negative   Other Symptoms none, all other systems are negative       Objective:    Vital signs in last 24 hours:    Vitals:    07/08/24 1153   BP: 121/86   Pulse: (!) 112   Weight: 80.3 kg (177 lb 1.6 oz)       Mental Status Evaluation:    Appearance age appropriate, casually dressed   Behavior cooperative, calm   Speech normal rate, normal volume, normal pitch   Mood euthymic   Affect normal range and intensity, appropriate   Thought Processes organized, goal directed   Associations intact associations   Thought Content no overt delusions   Perceptual Disturbances: no auditory hallucinations, no visual hallucinations   Abnormal Thoughts  Risk Potential Suicidal ideation - None  Homicidal ideation - None  Potential for aggression - No   Orientation oriented to person, place, time/date, and situation   Memory recent and remote memory grossly intact   Consciousness alert and awake   Attention Span Concentration Span attention span and concentration are age appropriate   Intellect appears to be of average intelligence   Insight intact   Judgement intact   Language No aphasia   Fund of Knowledge adequate fund of knowledge regarding past history  adequate fund of knowledge regarding vocabulary        Laboratory Results: I have reviewed the following laboratory results.    CBC:   Lab Results   Component Value Date    WBC 7.06 07/19/2022    RBC 4.79 07/19/2022    HGB 14.9 07/19/2022    HCT 44.2 07/19/2022    MCV 92 07/19/2022     07/19/2022    MCH 31.1 07/19/2022    MCHC 33.7 07/19/2022    RDW 11.9 07/19/2022    MPV 8.8 (L) 07/19/2022    NEUTROABS 4.21 07/19/2022     BMP:   Lab Results   Component  "Value Date    SODIUM 138 07/19/2022    K 3.9 07/19/2022     07/19/2022    CO2 27 07/19/2022    AGAP 6 07/19/2022    BUN 5 07/19/2022    CREATININE 0.55 (L) 07/19/2022    GLUC 93 07/19/2022    GLUF 83 01/24/2022    CALCIUM 8.4 07/19/2022    EGFR 128 07/19/2022     CMP:   Lab Results   Component Value Date    SODIUM 138 07/19/2022    K 3.9 07/19/2022     07/19/2022    CO2 27 07/19/2022    AGAP 6 07/19/2022    BUN 5 07/19/2022    CREATININE 0.55 (L) 07/19/2022    GLUC 93 07/19/2022    GLUF 83 01/24/2022    CALCIUM 8.4 07/19/2022    AST 26 07/19/2022    ALT 14 07/19/2022    ALKPHOS 50 07/19/2022    TP 7.1 07/19/2022    ALB 3.7 07/19/2022    TBILI 0.31 07/19/2022    EGFR 128 07/19/2022     Lipid Profile:   Lab Results   Component Value Date    HDL 45 07/27/2015    TRIG 108 07/27/2015    LDLCALC 103 (H) 07/27/2015     Hemoglobin A1C: No results found for: \"HGBA1C\", \"EAG\"  Liver Enzymes:   Lab Results   Component Value Date    AST 26 07/19/2022    ALT 14 07/19/2022    ALKPHOS 50 07/19/2022    PROT 7.3 07/27/2015    BILITOT 0.17 (L) 07/27/2015     Thyroid Studies:   Lab Results   Component Value Date    SQM6XINFLCKH 0.944 01/24/2022    FREET4 1.1 04/21/2014     Vitamin D Level No results found for: \"NRGU04HBKJLC\", \"JVFO824VKFT\"  Vitamin B12 No results found for: \"QJUQSRSS39\"  EKG No results found for: \"VENTRATE\", \"ATRIALRATE\", \"PRINT\", \"QRSDINT\", \"QTINT\", \"PAXIS\", \"QRSAXIS\", \"TWAVEAXIS\"    Suicide/Homicide Risk Assessment:    Risk of Harm to Self:  The following ratings are based on assessment at the time of the interview  Demographic risk factors include:   Historical Risk Factors include: history of depression, history of anxiety  Recent Specific Risk Factors include: mental illness diagnosis, experienced fleeting suicidal ideation  Protective Factors: no current suicidal ideation, ability to adapt to change, access to mental health treatment, compliant with medications, compliant with mental health " treatment, responsibilities and duties to others, restricted access to lethal means, stable living environment, stable job, supportive family, supportive friends  Weapons: none. The following steps have been taken to ensure weapons are properly secured: not applicable  Based on today's assessment, Raiza presents the following risk of harm to self: minimal     Risk of Harm to Others:  The following ratings are based on assessment at the time of the interview  Demographic Risk Factors include: none.  Historical Risk Factors include: none.  Recent Specific Risk Factors include: none.  Protective Factors: no current homicidal ideation, ability to adapt to change, access to mental health treatment, compliant with medications, compliant with mental health treatment, responsibilities and duties to others, restricted access to lethal means, stable living environment, stable job, supportive family, supportive friends  Weapons: none. The following steps have been taken to ensure weapons are properly secured: not applicable  Based on today's assessment, Raiza presents the following risk of harm to others: minimal       The following interventions are recommended: no intervention changes needed      Assessment/Plan:      Diagnoses and all orders for this visit:    Generalized anxiety disorder  -     buPROPion (WELLBUTRIN XL) 150 mg 24 hr tablet; Take 2 tablets (300 mg total) by mouth every morning Do not start before July 29, 2024.    MDD (major depressive disorder), recurrent episode, moderate (HCC)  -     buPROPion (WELLBUTRIN XL) 150 mg 24 hr tablet; Take 2 tablets (300 mg total) by mouth every morning Do not start before July 29, 2024.    Long-term use of high-risk medication  -     Comprehensive metabolic panel; Future          Treatment Recommendations/Precautions:    Patient is not at goal with respect to anxiety, low mood.     - Continue Wellbutrin  mg once daily for depressed mood - on September 5th pt to  decrease to 150 mg dose and have sent script to dispense 1 week before this.   - Continue Pristiq 50 mg once daily for depressed mood, anxiety  - Pt to obtain CMP, have printed scripts.     Patient to follow-up in 6 weeks.   Aware of 24 hour and weekend coverage for urgent situations accessed by calling [facility] main practice number    Medications Risks/Benefits:    Risks, Benefits And Possible Side Effects Of Medications:  Risks, benefits, and possible side effects of medications explained to Raiza and she verbalizes understanding and agreement for treatment.    Controlled Medication Discussion:   Not applicable - controlled prescriptions are not prescribed by this practice    Psychotherapy Provided:   Individual psychotherapy provided: Yes    Treatment Plan:  Completed and signed during the session: Not applicable - Treatment Plan not due at this session    This note was not shared with the patient due to this is a psychotherapy note     Visit Time  Visit Start Time: 11:30 AM  Visit Stop Time: 12:00 PM  Total Visit Duration:  30 minutes    Vee Chicas MD 07/08/24    This note has been constructed in part using a voice recognition system.   There may be translation, syntax, or grammatical errors. If you have any questions, please contact the dictating provider.

## 2024-07-08 ENCOUNTER — OFFICE VISIT (OUTPATIENT)
Dept: PSYCHIATRY | Facility: CLINIC | Age: 29
End: 2024-07-08
Payer: COMMERCIAL

## 2024-07-08 VITALS
HEART RATE: 112 BPM | SYSTOLIC BLOOD PRESSURE: 121 MMHG | WEIGHT: 177.1 LBS | DIASTOLIC BLOOD PRESSURE: 86 MMHG | BODY MASS INDEX: 29.47 KG/M2

## 2024-07-08 DIAGNOSIS — F33.1 MDD (MAJOR DEPRESSIVE DISORDER), RECURRENT EPISODE, MODERATE (HCC): ICD-10-CM

## 2024-07-08 DIAGNOSIS — Z79.899 LONG-TERM USE OF HIGH-RISK MEDICATION: ICD-10-CM

## 2024-07-08 DIAGNOSIS — F41.1 GENERALIZED ANXIETY DISORDER: Primary | ICD-10-CM

## 2024-07-08 PROCEDURE — 99214 OFFICE O/P EST MOD 30 MIN: CPT | Performed by: STUDENT IN AN ORGANIZED HEALTH CARE EDUCATION/TRAINING PROGRAM

## 2024-07-08 RX ORDER — BUPROPION HYDROCHLORIDE 150 MG/1
300 TABLET ORAL EVERY MORNING
Qty: 30 TABLET | Refills: 2 | Status: SHIPPED | OUTPATIENT
Start: 2024-07-29 | End: 2025-01-25

## 2024-07-18 DIAGNOSIS — F41.1 GENERALIZED ANXIETY DISORDER: ICD-10-CM

## 2024-07-18 DIAGNOSIS — F33.1 MDD (MAJOR DEPRESSIVE DISORDER), RECURRENT EPISODE, MODERATE (HCC): ICD-10-CM

## 2024-07-18 RX ORDER — BUPROPION HYDROCHLORIDE 150 MG/1
150 TABLET ORAL DAILY
Qty: 30 TABLET | Refills: 2 | Status: SHIPPED | OUTPATIENT
Start: 2024-08-05

## 2024-08-08 ENCOUNTER — APPOINTMENT (OUTPATIENT)
Dept: LAB | Age: 29
End: 2024-08-08
Payer: COMMERCIAL

## 2024-08-08 DIAGNOSIS — E61.1 IRON DEFICIENCY: ICD-10-CM

## 2024-08-08 DIAGNOSIS — Z79.899 LONG-TERM USE OF HIGH-RISK MEDICATION: ICD-10-CM

## 2024-08-08 DIAGNOSIS — F32.1 CURRENT MODERATE EPISODE OF MAJOR DEPRESSIVE DISORDER WITHOUT PRIOR EPISODE (HCC): ICD-10-CM

## 2024-08-08 DIAGNOSIS — F41.1 GENERALIZED ANXIETY DISORDER: ICD-10-CM

## 2024-08-08 LAB
ALBUMIN SERPL BCG-MCNC: 3.9 G/DL (ref 3.5–5)
ALP SERPL-CCNC: 45 U/L (ref 34–104)
ALT SERPL W P-5'-P-CCNC: 32 U/L (ref 7–52)
ANION GAP SERPL CALCULATED.3IONS-SCNC: 6 MMOL/L (ref 4–13)
AST SERPL W P-5'-P-CCNC: 20 U/L (ref 13–39)
BILIRUB SERPL-MCNC: 0.44 MG/DL (ref 0.2–1)
BUN SERPL-MCNC: 7 MG/DL (ref 5–25)
CALCIUM SERPL-MCNC: 8.7 MG/DL (ref 8.4–10.2)
CHLORIDE SERPL-SCNC: 105 MMOL/L (ref 96–108)
CO2 SERPL-SCNC: 27 MMOL/L (ref 21–32)
CREAT SERPL-MCNC: 0.73 MG/DL (ref 0.6–1.3)
FERRITIN SERPL-MCNC: 70 NG/ML (ref 11–307)
GFR SERPL CREATININE-BSD FRML MDRD: 111 ML/MIN/1.73SQ M
GLUCOSE P FAST SERPL-MCNC: 75 MG/DL (ref 65–99)
IRON SATN MFR SERPL: 37 % (ref 15–50)
IRON SERPL-MCNC: 143 UG/DL (ref 50–212)
POTASSIUM SERPL-SCNC: 3.9 MMOL/L (ref 3.5–5.3)
PROT SERPL-MCNC: 7.2 G/DL (ref 6.4–8.4)
SODIUM SERPL-SCNC: 138 MMOL/L (ref 135–147)
TIBC SERPL-MCNC: 383 UG/DL (ref 250–450)
TSH SERPL DL<=0.05 MIU/L-ACNC: 0.84 UIU/ML (ref 0.45–4.5)
UIBC SERPL-MCNC: 240 UG/DL (ref 155–355)

## 2024-08-08 PROCEDURE — 83550 IRON BINDING TEST: CPT

## 2024-08-08 PROCEDURE — 82728 ASSAY OF FERRITIN: CPT

## 2024-08-08 PROCEDURE — 84443 ASSAY THYROID STIM HORMONE: CPT

## 2024-08-08 PROCEDURE — 80053 COMPREHEN METABOLIC PANEL: CPT

## 2024-08-08 PROCEDURE — 83540 ASSAY OF IRON: CPT

## 2024-08-08 PROCEDURE — 36415 COLL VENOUS BLD VENIPUNCTURE: CPT

## 2024-08-15 ENCOUNTER — HOSPITAL ENCOUNTER (OUTPATIENT)
Dept: SLEEP CENTER | Facility: CLINIC | Age: 29
Discharge: HOME | End: 2024-08-15
Payer: COMMERCIAL

## 2024-08-15 ENCOUNTER — TELEPHONE (OUTPATIENT)
Age: 29
End: 2024-08-15

## 2024-08-15 DIAGNOSIS — R06.83 SNORING: ICD-10-CM

## 2024-08-15 DIAGNOSIS — G47.19 EXCESSIVE DAYTIME SLEEPINESS: ICD-10-CM

## 2024-08-15 DIAGNOSIS — G47.34 NOCTURNAL HYPOXEMIA: ICD-10-CM

## 2024-08-15 PROCEDURE — 95810 POLYSOM 6/> YRS 4/> PARAM: CPT | Performed by: INTERNAL MEDICINE

## 2024-08-15 PROCEDURE — 95810 POLYSOM 6/> YRS 4/> PARAM: CPT

## 2024-08-15 NOTE — TELEPHONE ENCOUNTER
Patient calling with questions regarding sleep study and would like to speak to clinical.  Please call back.

## 2024-08-16 PROBLEM — G47.19 EXCESSIVE DAYTIME SLEEPINESS: Status: ACTIVE | Noted: 2024-08-16

## 2024-08-16 NOTE — PROGRESS NOTES
Sleep Study Documentation    Pre-Sleep Study       Sleep testing procedure explained to patient:YES    Patient napped prior to study:NO    Caffeine:Dayshift worker after 12PM.  Caffeine use:YES- coffee  6 to 18 ounces    Alcohol:Dayshift workers after 5PM: Alcohol use:NO    Typical day for patient:YES       Study Documentation    Sleep Study Indications:     Sleep Study: Diagnostic   Snore:Mild  Supplemental O2: no    O2 flow rate (L/min) range   O2 flow rate (L/min) final   Minimum SaO2 95%  Baseline SaO2 98.6%      EKG abnormalities: no     EEG abnormalities: no    Were abnormal behaviors in sleep observed:NO    Is Total Sleep Study Recording Time < 2 hours: N/A    Is Total Sleep Study Recording Time > 2 hours but study is incomplete: N/A    Is Total Sleep Study Recording Time 6 hours or more but sleep was not obtained: NO    Patient classification: employed       Post-Sleep Study    Medication used at bedtime or during sleep study:YES other prescription medications    Patient reports time it took to fall asleep:greater than 60 minutes    Patient reports waking up during study:3 or more times.  Patient reports returning to sleep in greater than 30 minutes.    Patient reports sleeping 2 to 4 hours with dreaming.    Does the Patient feel this is a typical night of sleep:typical    Patient rated sleepiness: Somewhat sleepy or tired    PAP treatment:no.

## 2024-08-19 ENCOUNTER — TELEPHONE (OUTPATIENT)
Age: 29
End: 2024-08-19

## 2024-08-19 NOTE — TELEPHONE ENCOUNTER
Patient is calling regarding cancelling an appointment.    Date/Time: 8/19/2024 4pm    Reason: stomach virus    Patient was rescheduled: YES [x] NO []  If yes, when was Patient reschedule for: 10/3/2024 5pm    Patient requesting call back to reschedule: YES [] NO [x]

## 2024-08-21 ENCOUNTER — HOSPITAL ENCOUNTER (EMERGENCY)
Facility: HOSPITAL | Age: 29
Discharge: HOME | End: 2024-08-22
Attending: EMERGENCY MEDICINE
Payer: COMMERCIAL

## 2024-08-21 VITALS
HEART RATE: 111 BPM | RESPIRATION RATE: 18 BRPM | DIASTOLIC BLOOD PRESSURE: 97 MMHG | OXYGEN SATURATION: 99 % | TEMPERATURE: 97.6 F | SYSTOLIC BLOOD PRESSURE: 144 MMHG

## 2024-08-21 DIAGNOSIS — K80.50 BILIARY COLIC: ICD-10-CM

## 2024-08-21 DIAGNOSIS — R10.9 ABDOMINAL PAIN: Primary | ICD-10-CM

## 2024-08-21 PROCEDURE — 85025 COMPLETE CBC W/AUTO DIFF WBC: CPT

## 2024-08-21 PROCEDURE — 36415 COLL VENOUS BLD VENIPUNCTURE: CPT

## 2024-08-21 PROCEDURE — 99284 EMERGENCY DEPT VISIT MOD MDM: CPT

## 2024-08-21 PROCEDURE — 96361 HYDRATE IV INFUSION ADD-ON: CPT

## 2024-08-21 PROCEDURE — 93005 ELECTROCARDIOGRAM TRACING: CPT

## 2024-08-21 PROCEDURE — 96374 THER/PROPH/DIAG INJ IV PUSH: CPT

## 2024-08-21 PROCEDURE — 80053 COMPREHEN METABOLIC PANEL: CPT

## 2024-08-21 PROCEDURE — 83690 ASSAY OF LIPASE: CPT

## 2024-08-21 RX ORDER — ONDANSETRON 2 MG/ML
4 INJECTION INTRAMUSCULAR; INTRAVENOUS ONCE
Status: COMPLETED | OUTPATIENT
Start: 2024-08-21 | End: 2024-08-21

## 2024-08-21 RX ADMIN — SODIUM CHLORIDE 1000 ML: 0.9 INJECTION, SOLUTION INTRAVENOUS at 23:44

## 2024-08-21 RX ADMIN — ONDANSETRON 4 MG: 2 INJECTION INTRAMUSCULAR; INTRAVENOUS at 23:44

## 2024-08-22 LAB
ALBUMIN SERPL BCG-MCNC: 4.1 G/DL (ref 3.5–5)
ALP SERPL-CCNC: 50 U/L (ref 34–104)
ALT SERPL W P-5'-P-CCNC: 25 U/L (ref 7–52)
ANION GAP SERPL CALCULATED.3IONS-SCNC: 9 MMOL/L (ref 4–13)
AST SERPL W P-5'-P-CCNC: 36 U/L (ref 13–39)
BASOPHILS # BLD AUTO: 0.04 THOUSANDS/ÂΜL (ref 0–0.1)
BASOPHILS NFR BLD AUTO: 0 % (ref 0–1)
BILIRUB SERPL-MCNC: 0.32 MG/DL (ref 0.2–1)
BUN SERPL-MCNC: 8 MG/DL (ref 5–25)
CALCIUM SERPL-MCNC: 9.1 MG/DL (ref 8.4–10.2)
CHLORIDE SERPL-SCNC: 104 MMOL/L (ref 96–108)
CO2 SERPL-SCNC: 26 MMOL/L (ref 21–32)
CREAT SERPL-MCNC: 0.71 MG/DL (ref 0.6–1.3)
EOSINOPHIL # BLD AUTO: 0.05 THOUSAND/ÂΜL (ref 0–0.61)
EOSINOPHIL NFR BLD AUTO: 1 % (ref 0–6)
ERYTHROCYTE [DISTWIDTH] IN BLOOD BY AUTOMATED COUNT: 12.6 % (ref 11.6–15.1)
GFR SERPL CREATININE-BSD FRML MDRD: 115 ML/MIN/1.73SQ M
GLUCOSE SERPL-MCNC: 87 MG/DL (ref 65–140)
HCT VFR BLD AUTO: 40.4 % (ref 34.8–46.1)
HGB BLD-MCNC: 14 G/DL (ref 11.5–15.4)
IMM GRANULOCYTES # BLD AUTO: 0.02 THOUSAND/UL (ref 0–0.2)
IMM GRANULOCYTES NFR BLD AUTO: 0 % (ref 0–2)
LIPASE SERPL-CCNC: 55 U/L (ref 11–82)
LYMPHOCYTES # BLD AUTO: 3.21 THOUSANDS/ÂΜL (ref 0.6–4.47)
LYMPHOCYTES NFR BLD AUTO: 32 % (ref 14–44)
MCH RBC QN AUTO: 31 PG (ref 26.8–34.3)
MCHC RBC AUTO-ENTMCNC: 34.7 G/DL (ref 31.4–37.4)
MCV RBC AUTO: 89 FL (ref 82–98)
MONOCYTES # BLD AUTO: 0.79 THOUSAND/ÂΜL (ref 0.17–1.22)
MONOCYTES NFR BLD AUTO: 8 % (ref 4–12)
NEUTROPHILS # BLD AUTO: 5.82 THOUSANDS/ÂΜL (ref 1.85–7.62)
NEUTS SEG NFR BLD AUTO: 59 % (ref 43–75)
NRBC BLD AUTO-RTO: 0 /100 WBCS
PLATELET # BLD AUTO: 405 THOUSANDS/UL (ref 149–390)
PMV BLD AUTO: 9 FL (ref 8.9–12.7)
POTASSIUM SERPL-SCNC: 3.9 MMOL/L (ref 3.5–5.3)
PROT SERPL-MCNC: 7 G/DL (ref 6.4–8.4)
RBC # BLD AUTO: 4.52 MILLION/UL (ref 3.81–5.12)
SODIUM SERPL-SCNC: 139 MMOL/L (ref 135–147)
WBC # BLD AUTO: 9.93 THOUSAND/UL (ref 4.31–10.16)

## 2024-08-22 PROCEDURE — 76705 ECHO EXAM OF ABDOMEN: CPT | Performed by: EMERGENCY MEDICINE

## 2024-08-22 PROCEDURE — 99284 EMERGENCY DEPT VISIT MOD MDM: CPT | Performed by: EMERGENCY MEDICINE

## 2024-08-22 NOTE — DISCHARGE INSTRUCTIONS
Please return to the emergency department if you develop new or worsening symptoms including fever, worsening abdominal pain, nausea and vomiting that does not resolve on its own.    Please avoid consuming fatty foods.    Please follow-up with your primary care provider for additional care and management of your abdominal pain, please follow up with the general surgery office for further management of biliary pain.     Please continue to take your home medications as prescribed, please take Motrin and Tylenol as needed for pain, please schedule an appointment for your gallbladder ultrasound.

## 2024-08-22 NOTE — ED PROVIDER NOTES
History  Chief Complaint   Patient presents with    Abdominal Pain     Second episode of super intense pain in mid abd, pt also reports n/v, happened once before about 2 weeks ago and went away on its own     Patient is a 29-year-old female with past medical history of generalized anxiety disorder, MDD, DARIEN, who presents emergency department for complaint of epigastric pain.  Patient is reporting a second episode of very strong epigastric pain, reports she has had 1 episode similar about 3 weeks ago, reports it was just after eating routine at [employer], reports that the episode today was just after eating [employer], reports a cramping epigastric pain with radiation to the back, does not radiate anywhere else, no chest pain, no shortness of breath, no history of abdominal surgery, resolved after an episode of nausea and vomiting, nonbilious, reports that the previous episode resolved on its own after about 4 hours, does not take any medications at home, pain is currently a 2 out of 10, has improved with laying down, still nauseated but no vomiting, no new onset rash or skin change, no dysuria symptoms, no diarrhea no headaches, no fevers, no sore throat/cough/no sick contacts.  Reports mother has history of gallbladder removal.         Prior to Admission Medications   Prescriptions Last Dose Informant Patient Reported? Taking?   Biotin 10 MG CHEW  Self Yes No   Sig: Chew   Multiple Vitamins-Calcium (ONE-A-DAY WOMENS FORMULA PO)  Self Yes No   Sig: Take by mouth   buPROPion (WELLBUTRIN XL) 150 mg 24 hr tablet   No No   Sig: Take 2 tablets (300 mg total) by mouth every morning Do not start before July 29, 2024.   buPROPion (Wellbutrin XL) 150 mg 24 hr tablet   No No   Sig: Take 1 tablet (150 mg total) by mouth daily Do not start before August 5, 2024.   desvenlafaxine succinate 25 MG TB24   No No   Sig: Take 1 tablet (25 mg total) by mouth daily Can increase to 2 tablets once daily after 7 days if tolerated well.    levonorgestrel-ethinyl estradiol (Jolessa) 0.15-0.03 MG per tablet   No No   Sig: Take 1 tablet by mouth daily      Facility-Administered Medications: None       Past Medical History:   Diagnosis Date    COVID-19 1/27/2021    Depression     Exposure to influenza 2/1/2018       Past Surgical History:   Procedure Laterality Date    NO PAST SURGERIES         Family History   Problem Relation Age of Onset    No Known Problems Mother     Diabetes Father     Cervical cancer Sister     Depression Sister     No Known Problems Sister     No Known Problems Sister     No Known Problems Sister     No Known Problems Brother     Bipolar disorder Paternal Uncle      I have reviewed and agree with the history as documented.    E-Cigarette/Vaping    E-Cigarette Use Never User      E-Cigarette/Vaping Substances    Nicotine No     THC No     CBD No     Flavoring No     Other No      Social History     Tobacco Use    Smoking status: Never     Passive exposure: Never    Smokeless tobacco: Never   Vaping Use    Vaping status: Never Used   Substance Use Topics    Alcohol use: Yes    Drug use: No        Review of Systems    Physical Exam  ED Triage Vitals   Temperature Pulse Respirations Blood Pressure SpO2   08/21/24 2254 08/21/24 2254 08/21/24 2254 08/21/24 2255 08/21/24 2254   97.6 °F (36.4 °C) (!) 111 18 144/97 99 %      Temp Source Heart Rate Source Patient Position - Orthostatic VS BP Location FiO2 (%)   08/21/24 2254 08/21/24 2254 08/21/24 2255 08/21/24 2255 --   Temporal Monitor Sitting Left arm       Pain Score       08/21/24 2255       7             Orthostatic Vital Signs  Vitals:    08/21/24 2254 08/21/24 2255   BP:  144/97   Pulse: (!) 111    Patient Position - Orthostatic VS:  Sitting       Physical Exam  Vitals and nursing note reviewed.   Constitutional:       General: She is not in acute distress.     Appearance: She is well-developed. She is not ill-appearing, toxic-appearing or diaphoretic.   HENT:      Head:  Normocephalic and atraumatic.   Eyes:      Conjunctiva/sclera: Conjunctivae normal.   Cardiovascular:      Rate and Rhythm: Regular rhythm. Tachycardia present.      Heart sounds: No murmur heard.  Pulmonary:      Effort: Pulmonary effort is normal. No respiratory distress.      Breath sounds: Normal breath sounds. No wheezing, rhonchi or rales.   Abdominal:      General: Abdomen is flat. There is no distension.      Palpations: Abdomen is soft.      Tenderness: There is abdominal tenderness in the epigastric area and periumbilical area. There is no right CVA tenderness, left CVA tenderness, guarding or rebound. Negative signs include Hawley's sign, Rovsing's sign and McBurney's sign.      Hernia: No hernia is present.   Musculoskeletal:         General: No swelling.      Cervical back: Neck supple.   Skin:     General: Skin is warm and dry.      Capillary Refill: Capillary refill takes less than 2 seconds.      Coloration: Skin is not pale.      Findings: No rash.   Neurological:      General: No focal deficit present.      Mental Status: She is alert.   Psychiatric:         Mood and Affect: Mood normal.         ED Medications  Medications   sodium chloride 0.9 % bolus 1,000 mL (0 mL Intravenous Stopped 8/22/24 0053)   ondansetron (ZOFRAN) injection 4 mg (4 mg Intravenous Given 8/21/24 2344)       Diagnostic Studies  Results Reviewed       Procedure Component Value Units Date/Time    CBC and differential [[id number]]  (Abnormal) Collected: 08/21/24 2344    Lab Status: Final result Specimen: Blood from Arm, Right Updated: 08/22/24 0015     WBC 9.93 Thousand/uL      RBC 4.52 Million/uL      Hemoglobin 14.0 g/dL      Hematocrit 40.4 %      MCV 89 fL      MCH 31.0 pg      MCHC 34.7 g/dL      RDW 12.6 %      MPV 9.0 fL      Platelets 405 Thousands/uL      nRBC 0 /100 WBCs      Segmented % 59 %      Immature Grans % 0 %      Lymphocytes % 32 %      Monocytes % 8 %      Eosinophils Relative 1 %      Basophils Relative 0 %       Absolute Neutrophils 5.82 Thousands/µL      Absolute Immature Grans 0.02 Thousand/uL      Absolute Lymphocytes 3.21 Thousands/µL      Absolute Monocytes 0.79 Thousand/µL      Eosinophils Absolute 0.05 Thousand/µL      Basophils Absolute 0.04 Thousands/µL     Comprehensive metabolic panel [[id number]] Collected: 08/21/24 2344    Lab Status: Final result Specimen: Blood from Arm, Right Updated: 08/22/24 0015     Sodium 139 mmol/L      Potassium 3.9 mmol/L      Chloride 104 mmol/L      CO2 26 mmol/L      ANION GAP 9 mmol/L      BUN 8 mg/dL      Creatinine 0.71 mg/dL      Glucose 87 mg/dL      Calcium 9.1 mg/dL      AST 36 U/L      ALT 25 U/L      Alkaline Phosphatase 50 U/L      Total Protein 7.0 g/dL      Albumin 4.1 g/dL      Total Bilirubin 0.32 mg/dL      eGFR 115 ml/min/1.73sq m     Narrative:      National Kidney Disease Foundation guidelines for Chronic Kidney Disease (CKD):     Stage 1 with normal or high GFR (GFR > 90 mL/min/1.73 square meters)    Stage 2 Mild CKD (GFR = 60-89 mL/min/1.73 square meters)    Stage 3A Moderate CKD (GFR = 45-59 mL/min/1.73 square meters)    Stage 3B Moderate CKD (GFR = 30-44 mL/min/1.73 square meters)    Stage 4 Severe CKD (GFR = 15-29 mL/min/1.73 square meters)    Stage 5 End Stage CKD (GFR <15 mL/min/1.73 square meters)  Note: GFR calculation is accurate only with a steady state creatinine    Lipase [[id number]]  (Normal) Collected: 08/21/24 2344    Lab Status: Final result Specimen: Blood from Arm, Right Updated: 08/22/24 0015     Lipase 55 u/L                    US right upper quadrant    (Results Pending)         Procedures  POC Biliary US    Date/Time: 8/22/2024 1:10 AM    Performed by: Antonio Mcnair DO  Authorized by: Antonio Mcnair DO    Patient location:  ED  Performed by:  Resident  Other Assisting Provider: No    Procedure details:     Exam Type:  Diagnostic    Indications: upper right quadrant abdominal pain and epigastric pain      Assessment for:  Cholecystitis and  cholelithiasis    Views obtained: gallbladder (transverse and longitudinal), liver, common bile duct and portal triad      Image quality: diagnostic      Image availability:  Images available in PACS  Findings:     Cholelithiasis: not identified      Common bile duct:  Unable to visualize    Gallbladder wall:  Abnormal    Gallbladder wall thickened (>3 mm): yes      Gallbladder wall thickness (mm):  1    Pericholecystic fluid: not identified      Sonographic Hawley's sign: negative      Polyps: not identified      Mass: not identified    Interpretation:     Biliary ultrasound impressions: indeterminate          ED Course                             SBIRT 20yo+      Flowsheet Row Most Recent Value   Initial Alcohol Screen: US AUDIT-C     1. How often do you have a drink containing alcohol? 1 Filed at: 08/21/2024 2256   2. How many drinks containing alcohol do you have on a typical day you are drinking?  1 Filed at: 08/21/2024 2256   3a. Male UNDER 65: How often do you have five or more drinks on one occasion? 0 Filed at: 08/21/2024 2256   3b. FEMALE Any Age, or MALE 65+: How often do you have 4 or more drinks on one occassion? 0 Filed at: 08/21/2024 2256   Audit-C Score 2 Filed at: 08/21/2024 2256   FANNIE: How many times in the past year have you...    Used an illegal drug or used a prescription medication for non-medical reasons? Never Filed at: 08/21/2024 2256                  Medical Decision Making  Vital signs on arrival patient is tachycardic with pulse of 111, otherwise vital signs within normal. On exam patient is seen in stretcher bed, no evidence respiratory distress, appears comfortable, abdomen is soft with mild tenderness to palpation of the periumbilical and epigastric region.    History and physical exam most consistent with biliary colic. However, differential diagnosis included but not limited to pancreatitis, cholelithiasis, cholecystitis, choledocholithiasis, electrolyte abnormality. Plan 1 L bolus  normal saline, Zofran, ECG, CBC/CMP/lipase, right upper quadrant point-of-care ultrasound.     View ED course above for further discussion on patient workup.     Laboratory evaluation including CBC/CMP/lipase within normal limits, ECG demonstrates no evidence of arrhythmia or ACS, status post administration 1 L bolus normal saline and Zofran 4 mg, point-of-care ultrasound without evidence of cholecystitis or cholelithiasis, choledocholithiasis.     All labs reviewed and utilized in the medical decision making process  All radiology studies independently viewed by me and interpreted by the radiologist.  I reviewed all testing with the patient.     Upon re-evaluation had shared decision-making with the patient, at this point time she remains asymptomatic, vital signs within normal limits, no evidence of fevers, normal laboratory evaluation, ultrasound demonstrating evidence of biliary sludge, consistent with biliary colic, no evidence of stones, at this point in time believe the patient would benefit from a formal ultrasound, prescription given for formal ultrasound, ambulatory referral to general surgery placed.    Return precautions given, PCP follow-up recommended.      Amount and/or Complexity of Data Reviewed  Labs: ordered.  Radiology: ordered.    Risk  Prescription drug management.          Disposition  Final diagnoses:   Abdominal pain   Biliary colic     Time reflects when diagnosis was documented in both MDM as applicable and the Disposition within this note       Time User Action Codes Description Comment    8/22/2024 12:40 AM Antonio Mcnair Add [R10.9] Abdominal pain     8/22/2024 12:40 AM Antonio Mcnair Add [K80.50] Biliary colic           ED Disposition       ED Disposition   Discharge    Condition   Stable    Date/Time   Thu Aug 22, 2024 0040    Comment   Raiza Vicente discharge to home/self care.                   Follow-up Information       Follow up With Specialties Details Why Contact Info Additional  Information    [facility], Family Medicine, Nurse Practitioner Schedule an appointment as soon as possible for a visit in 1 week  [facility] [address] [address] [phone]       [facility] Emergency Medicine Go to  If symptoms worsen [address] [facility] [id number] [phone] [facility] Emergency Department, [address] [place], [place], [address] [phone]            Discharge Medication List as of 8/22/2024 12:43 AM        CONTINUE these medications which have NOT CHANGED    Details   Biotin 10 MG CHEW Chew, Historical Med      !! buPROPion (WELLBUTRIN XL) 150 mg 24 hr tablet Take 2 tablets (300 mg total) by mouth every morning Do not start before July 29, 2024., Starting Mon 7/29/2024, Until Sat 1/25/2025, Normal      !! buPROPion (Wellbutrin XL) 150 mg 24 hr tablet Take 1 tablet (150 mg total) by mouth daily Do not start before August 5, 2024., Starting Mon 8/5/2024, Normal      desvenlafaxine succinate 25 MG TB24 Take 1 tablet (25 mg total) by mouth daily Can increase to 2 tablets once daily after 7 days if tolerated well., Starting Wed 5/22/2024, Normal      levonorgestrel-ethinyl estradiol (Jolessa) 0.15-0.03 MG per tablet Take 1 tablet by mouth daily, Starting Wed 3/27/2024, Normal      Multiple Vitamins-Calcium (ONE-A-DAY WOMENS FORMULA PO) Take by mouth, Historical Med       !! - Potential duplicate medications found. Please discuss with provider.        Outpatient Discharge Orders   US right upper quadrant   Standing Status: Future Standing Exp. Date: 08/22/28       PDMP Review       None             ED Provider  Attending physically available and evaluated Raiza Vicente. I managed the patient along with the ED Attending.    Electronically Signed by           Antonio Mcnair DO  08/22/24 2041

## 2024-08-22 NOTE — ED ATTENDING ATTESTATION
8/21/2024  I, Ganesh Banks MD, saw and evaluated the patient. I have discussed the patient with the resident/non-physician practitioner and agree with the resident's/non-physician practitioner's findings, Plan of Care, and MDM as documented in the resident's/non-physician practitioner's note, except where noted. All available labs and Radiology studies were reviewed.  I was present for key portions of any procedure(s) performed by the resident/non-physician practitioner and I was immediately available to provide assistance.       At this point I agree with the current assessment done in the Emergency Department.  I have conducted an independent evaluation of this patient a history and physical is as follows:    29-year-old female presents to the emergency department for evaluation of epigastric abdominal pain that was described as severe in nature and radiated to the back.  Symptoms started after a meal.  Improved following episode of nonbloody nonbilious emesis.  She states she had a similar episode a few weeks ago that resolved on its own.  No prior abdominal surgeries.  No constipation or diarrhea.  No fevers or chills.    On exam, patient was comfortably in bed in no acute distress, and is normocephalic atraumatic, pupils equal round reactive, neck is supple without meningismus signs, heart is regular rate and rhythm with tight distal pulses, no increased work of breathing, respiratory distress, or stridor.  Abdomen is soft, nontender nondistended without rebound or guarding.  Negative Hawley sign.    Differential diagnosis includes but is not limited to GERD, gastritis, peptic ulcer disease, biliary disease, pancreatitis.  Plan to check abdominal labs, bedside right upper quadrant ultrasound.    Labs grossly unremarkable, right upper quadrant ultrasound demonstrates biliary sludge without any evidence of acute cholecystitis.  As symptoms are currently well-controlled at this time, plan to discharge home,  will will place order for formal right upper quadrant ultrasound and give general surgery referral.        ED Course         Critical Care Time  Procedures

## 2024-08-23 LAB
ATRIAL RATE: 88 BPM
P AXIS: 48 DEGREES
PR INTERVAL: 150 MS
QRS AXIS: 82 DEGREES
QRSD INTERVAL: 82 MS
QT INTERVAL: 334 MS
QTC INTERVAL: 404 MS
T WAVE AXIS: 55 DEGREES
VENTRICULAR RATE: 88 BPM

## 2024-08-23 PROCEDURE — 93010 ELECTROCARDIOGRAM REPORT: CPT | Performed by: INTERNAL MEDICINE

## 2024-08-24 ENCOUNTER — HOSPITAL ENCOUNTER (OUTPATIENT)
Dept: RADIOLOGY | Facility: HOSPITAL | Age: 29
Discharge: HOME | End: 2024-08-24
Attending: EMERGENCY MEDICINE
Payer: COMMERCIAL

## 2024-08-24 DIAGNOSIS — K80.50 BILIARY COLIC: ICD-10-CM

## 2024-08-24 PROCEDURE — 76705 ECHO EXAM OF ABDOMEN: CPT

## 2024-08-27 DIAGNOSIS — N94.3 PMS (PREMENSTRUAL SYNDROME): ICD-10-CM

## 2024-08-27 RX ORDER — LEVONORGESTREL AND ETHINYL ESTRADIOL 0.15-0.03
1 KIT ORAL DAILY
Qty: 91 TABLET | Refills: 1 | Status: SHIPPED | OUTPATIENT
Start: 2024-08-27 | End: 2024-08-28 | Stop reason: SDUPTHER

## 2024-08-28 ENCOUNTER — ANNUAL EXAM (OUTPATIENT)
Dept: OBGYN CLINIC | Facility: CLINIC | Age: 29
End: 2024-08-28
Payer: COMMERCIAL

## 2024-08-28 VITALS
WEIGHT: 178.4 LBS | DIASTOLIC BLOOD PRESSURE: 82 MMHG | BODY MASS INDEX: 29.72 KG/M2 | SYSTOLIC BLOOD PRESSURE: 126 MMHG | HEIGHT: 65 IN

## 2024-08-28 DIAGNOSIS — N94.3 PMS (PREMENSTRUAL SYNDROME): ICD-10-CM

## 2024-08-28 DIAGNOSIS — Z01.419 ENCOUNTER FOR ANNUAL ROUTINE GYNECOLOGICAL EXAMINATION: Primary | ICD-10-CM

## 2024-08-28 PROCEDURE — S0612 ANNUAL GYNECOLOGICAL EXAMINA: HCPCS | Performed by: OBSTETRICS & GYNECOLOGY

## 2024-08-28 RX ORDER — LEVONORGESTREL AND ETHINYL ESTRADIOL 0.15-0.03
1 KIT ORAL DAILY
Qty: 91 TABLET | Refills: 3 | Status: SHIPPED | OUTPATIENT
Start: 2024-08-28

## 2024-09-06 ENCOUNTER — TELEPHONE (OUTPATIENT)
Dept: SLEEP CENTER | Facility: CLINIC | Age: 29
End: 2024-09-06

## 2024-09-06 NOTE — TELEPHONE ENCOUNTER
Sleep study resulted and did not show sleep apnea.  Follow up with Dr. Jain recommended.     Call to patient to inform of request for follow up, voicemail message left with number to call to schedule an appointment.

## 2024-09-06 NOTE — TELEPHONE ENCOUNTER
----- Message from Brigitte Jain MD sent at 8/16/2024  3:16 PM EDT -----  Please schedule follow up to discuss results and next steps, thanks.

## 2024-09-09 LAB
CLINICAL INFO: NORMAL
CYTO CVX: NORMAL
DATE PREVIOUS BX: NORMAL
LMP START DATE: NORMAL
SL AMB PREV. PAP:: NORMAL
SPECIMEN SOURCE CVX/VAG CYTO: NORMAL

## 2024-09-11 ENCOUNTER — CONSULT (OUTPATIENT)
Dept: SURGERY | Facility: CLINIC | Age: 29
End: 2024-09-11
Payer: COMMERCIAL

## 2024-09-11 VITALS
HEIGHT: 65 IN | BODY MASS INDEX: 29.16 KG/M2 | WEIGHT: 175 LBS | HEART RATE: 107 BPM | TEMPERATURE: 97.2 F | DIASTOLIC BLOOD PRESSURE: 87 MMHG | SYSTOLIC BLOOD PRESSURE: 127 MMHG

## 2024-09-11 DIAGNOSIS — K80.50 BILIARY COLIC: ICD-10-CM

## 2024-09-11 PROCEDURE — 99244 OFF/OP CNSLTJ NEW/EST MOD 40: CPT | Performed by: SURGERY

## 2024-09-11 NOTE — PROGRESS NOTES
Ambulatory Visit  Name: Raiza Vicente      : 1995      MRN: [id number]  Encounter Provider: Carlos Alberto Santacruz MD  Encounter Date: 2024   Encounter department: [facility] SURGERY [place]    Assessment & Plan  Biliary colic  I explained to her that all her symptoms seem to go along eith biliary tract disease and cholecystectomy would be in order. I discussed the procedure in detail including risks, benefits, alternatives, and what to expect post op.  She understands and is agreeable to proceed.    Plan: robot or laparoscopic cholecystectomy  Orders:    Ambulatory Referral to General Surgery      History of Present Illness     Raiza Vicente is a 29 y.o. female who presents with history of abdominal pain.  Epigastrc with radiation to the back after eating fatty or greasy meal.  2 significant attacks, associated nausea and vomiting, no fever, no chills, no jaundice.  Ultrasound documents gallstones      Review of Systems   Constitutional:  Negative for chills and fever.   HENT:  Negative for trouble swallowing and voice change.    Eyes:  Negative for pain and visual disturbance.   Respiratory:  Negative for cough and shortness of breath.    Cardiovascular:  Negative for chest pain and leg swelling.   Gastrointestinal:  Negative for abdominal pain, nausea and vomiting.   Endocrine: Negative for cold intolerance, heat intolerance, polydipsia, polyphagia and polyuria.   Genitourinary:  Negative for difficulty urinating and flank pain.   Musculoskeletal:  Negative for arthralgias and gait problem.   Skin:  Negative for rash and wound.   Allergic/Immunologic: Negative for food allergies.   Neurological:  Negative for seizures, syncope, weakness and headaches.   Hematological:  Negative for adenopathy.   Psychiatric/Behavioral:  Negative for confusion.    All other systems reviewed and are negative.    Past Medical History   Past Medical History:   Diagnosis Date    COVID-19 2021    Depression      "Exposure to influenza 2/1/2018     Past Surgical History:   Procedure Laterality Date    NO PAST SURGERIES       Family History   Problem Relation Age of Onset    No Known Problems Mother     Diabetes Father     Cervical cancer Sister     Depression Sister     No Known Problems Sister     No Known Problems Sister     No Known Problems Sister     No Known Problems Brother     Bipolar disorder Paternal Uncle      Current Outpatient Medications on File Prior to Visit   Medication Sig Dispense Refill    buPROPion (Wellbutrin XL) 150 mg 24 hr tablet Take 1 tablet (150 mg total) by mouth daily Do not start before August 5, 2024. 30 tablet 2    desvenlafaxine succinate 25 MG TB24 Take 1 tablet (25 mg total) by mouth daily Can increase to 2 tablets once daily after 7 days if tolerated well. 60 tablet 2    levonorgestrel-ethinyl estradiol (SEASONALE) 0.15-0.03 MG per tablet Take 1 tablet by mouth daily 91 tablet 3    Multiple Vitamins-Calcium (ONE-A-DAY WOMENS FORMULA PO) Take by mouth       No current facility-administered medications on file prior to visit.   No Known Allergies       Objective     /87 (BP Location: Left arm, Patient Position: Sitting, Cuff Size: Standard)   Pulse (!) 107   Temp (!) 97.2 °F (36.2 °C) (Skin)   Ht 5' 5\" (1.651 m)   Wt 79.4 kg (175 lb)   LMP 08/23/2024 (Exact Date)   BMI 29.12 kg/m²     Physical Exam  Vitals and nursing note reviewed.   Constitutional:       General: She is not in acute distress.     Appearance: She is well-developed. She is not diaphoretic.   HENT:      Head: Normocephalic and atraumatic.      Right Ear: External ear normal.      Left Ear: External ear normal.   Eyes:      General: No scleral icterus.     Conjunctiva/sclera: Conjunctivae normal.   Neck:      Thyroid: No thyromegaly.      Trachea: No tracheal deviation.   Cardiovascular:      Rate and Rhythm: Normal rate and regular rhythm.      Heart sounds: Normal heart sounds. No murmur heard.     No friction " rub.   Pulmonary:      Effort: Pulmonary effort is normal. No respiratory distress.      Breath sounds: Normal breath sounds. No wheezing or rales.   Abdominal:      General: There is no distension.      Palpations: Abdomen is soft. There is no mass.      Tenderness: There is no abdominal tenderness. There is no guarding or rebound.   Musculoskeletal:         General: Normal range of motion.      Cervical back: Neck supple.   Lymphadenopathy:      Cervical: No cervical adenopathy.   Skin:     General: Skin is warm and dry.   Neurological:      Mental Status: She is alert and oriented to person, place, and time.   Psychiatric:         Behavior: Behavior normal.         Thought Content: Thought content normal.         Judgment: Judgment normal.

## 2024-09-23 ENCOUNTER — TELEPHONE (OUTPATIENT)
Age: 29
End: 2024-09-23

## 2024-09-23 NOTE — TELEPHONE ENCOUNTER
Patient called requesting refill for Bupropion  mg. Patient made aware medication was refilled on 08/05/24 for 30 with 2 refills to [facility] pharmacy. Patient instructed to contact the pharmacy to obtain refills of medication. Patient verbalized understanding.

## 2024-09-30 ENCOUNTER — TELEPHONE (OUTPATIENT)
Age: 29
End: 2024-09-30

## 2024-09-30 DIAGNOSIS — F33.1 MDD (MAJOR DEPRESSIVE DISORDER), RECURRENT EPISODE, MODERATE (HCC): ICD-10-CM

## 2024-09-30 DIAGNOSIS — F41.1 GENERALIZED ANXIETY DISORDER: ICD-10-CM

## 2024-09-30 RX ORDER — DESVENLAFAXINE 25 MG/1
50 TABLET, EXTENDED RELEASE ORAL DAILY
Qty: 60 TABLET | Refills: 2 | Status: SHIPPED | OUTPATIENT
Start: 2024-09-30 | End: 2024-10-03 | Stop reason: DRUGHIGH

## 2024-09-30 NOTE — TELEPHONE ENCOUNTER
Patient called to schedule surgery. She had consult on 09/11/24 and she said she did sign paperwork with Dr. Alvarez since it was at end of the day.  She messaged office this morning, because she had another attack. Please call her to schedule surgery.

## 2024-09-30 NOTE — TELEPHONE ENCOUNTER
Medication Refill Request     Name of Medication Desvenlafaxine Succinate  Dose/Frequency 25 mg 24 hr / Take 1 tablet by mouth daily.Can increase to 2 tablets daily after 7 days if tolerated well  Quantity 60  Verified pharmacy   [x]  Verified ordering Provider   [x]  Does patient have enough for the next 3 days? Yes [] No [x]  Does patient have a follow-up appointment scheduled? Yes [x] No []   If so when is appointment: 10/3/2024

## 2024-10-01 ENCOUNTER — TELEPHONE (OUTPATIENT)
Dept: SURGERY | Facility: CLINIC | Age: 29
End: 2024-10-01

## 2024-10-03 ENCOUNTER — OFFICE VISIT (OUTPATIENT)
Dept: PSYCHIATRY | Facility: CLINIC | Age: 29
End: 2024-10-03
Payer: COMMERCIAL

## 2024-10-03 VITALS — BODY MASS INDEX: 28.42 KG/M2 | DIASTOLIC BLOOD PRESSURE: 79 MMHG | WEIGHT: 170.8 LBS | SYSTOLIC BLOOD PRESSURE: 124 MMHG

## 2024-10-03 DIAGNOSIS — F33.1 MDD (MAJOR DEPRESSIVE DISORDER), RECURRENT EPISODE, MODERATE (HCC): ICD-10-CM

## 2024-10-03 DIAGNOSIS — F41.1 GENERALIZED ANXIETY DISORDER: Primary | ICD-10-CM

## 2024-10-03 PROCEDURE — 99214 OFFICE O/P EST MOD 30 MIN: CPT | Performed by: STUDENT IN AN ORGANIZED HEALTH CARE EDUCATION/TRAINING PROGRAM

## 2024-10-03 RX ORDER — BUPROPION HYDROCHLORIDE 150 MG/1
150 TABLET ORAL DAILY
Qty: 90 TABLET | Refills: 1 | Status: SHIPPED | OUTPATIENT
Start: 2024-10-03

## 2024-10-03 RX ORDER — DESVENLAFAXINE 50 MG/1
50 TABLET, FILM COATED, EXTENDED RELEASE ORAL DAILY
Qty: 90 TABLET | Refills: 1 | Status: SHIPPED | OUTPATIENT
Start: 2024-10-03

## 2024-10-03 RX ORDER — DESVENLAFAXINE 50 MG/1
50 TABLET, FILM COATED, EXTENDED RELEASE ORAL DAILY
Qty: 90 TABLET | Refills: 1 | Status: SHIPPED | OUTPATIENT
Start: 2024-10-03 | End: 2024-10-03 | Stop reason: SDUPTHER

## 2024-10-03 NOTE — ASSESSMENT & PLAN NOTE
Improved, at goal.    - Continue Pristiq 24 hour tablet 50 mg once daily for depressed mood, anxiety  Orders:    desvenlafaxine succinate (PRISTIQ) 50 mg 24 hr tablet; Take 1 tablet (50 mg total) by mouth daily

## 2024-10-03 NOTE — TELEPHONE ENCOUNTER
Pt called back to speak to [facility] to schedule her surgery so I transferred her to [facility] in the office

## 2024-10-03 NOTE — PSYCH
"MEDICATION MANAGEMENT NOTE        [facility] - [facility]YCHIATRIC [facility]      Name and Date of Birth:  Raiza Vicente 29 y.o. 1995 MRN: [id number]    Date of Visit: October 3, 2024    Subjective:    Patient was seen for follow-up examination and the chart was reviewed. She continues to see her own private therapist. She has been doing very well since I last saw her - she notes she has returned to school and she spoke to her supervisor and let her know she is feeling very burnt out, she was able to have a child with difficult behaviors transferred out of her class which has improved her mental wellbeing in her job. She notes she continues to struggle with some of the work culture and feels ultimately she may consider another career down the road, but for now is not suffering as she was before. She notes depressed mood is a \"2/10\" with 10 being the most depressed, which she states is a large improvement. She notes some anticipatory anxiety about going in to work however tries to remind herself it is the \"kids that matter\", not the other anxiety-provoking things like work culture. She continues on Pristiq 50 mg 24 hour tablet and Wellbutrin  mg and has had good effect with these, she wishes to continue. Have sent 90-day supplies to [facility] home delivery per her preference.     Note otherwise she has been having some episodes of GI distress after eating and was determined she would likely benefit from gallbladder removal which she will be having in November.     She denies any side effects from medications.    Psychiatric Review Of Systems:     Sleep changes: no  Appetite changes: no  Auditory hallucinations: no  Visual hallucinations: no  Delusional thinking: no    Suicidal ideation: no  Homicidal ideation: no    Medical Review Of Systems:    Constitutional negative   ENT negative   Cardiovascular negative   Respiratory negative   Gastrointestinal negative   Genitourinary negative "   Musculoskeletal negative   Integumentary negative   Neurological negative   Endocrine negative   Other Symptoms none, all other systems are negative       Objective:    Vital signs in last 24 hours:    Vitals:    10/03/24 1716   BP: 124/79   Weight: 77.5 kg (170 lb 12.8 oz)       Mental Status Evaluation:    Appearance age appropriate, casually dressed   Behavior cooperative, calm   Speech normal rate, normal volume, normal pitch   Mood euthymic   Affect normal range and intensity, appropriate   Thought Processes organized, goal directed   Associations intact associations   Thought Content no overt delusions   Perceptual Disturbances: no auditory hallucinations, no visual hallucinations   Abnormal Thoughts  Risk Potential Suicidal ideation - None  Homicidal ideation - None  Potential for aggression - No   Orientation oriented to person, place, time/date, and situation   Memory recent and remote memory grossly intact   Consciousness alert and awake   Attention Span Concentration Span attention span and concentration are age appropriate   Intellect appears to be of average intelligence   Insight intact   Judgement intact   Language No aphasia   Fund of Knowledge adequate fund of knowledge regarding past history  adequate fund of knowledge regarding vocabulary        Laboratory Results: I have reviewed the following laboratory results.    CBC:   Lab Results   Component Value Date    WBC 9.93 08/21/2024    RBC 4.52 08/21/2024    HGB 14.0 08/21/2024    HCT 40.4 08/21/2024    MCV 89 08/21/2024     (H) 08/21/2024    MCH 31.0 08/21/2024    MCHC 34.7 08/21/2024    RDW 12.6 08/21/2024    MPV 9.0 08/21/2024    NEUTROABS 5.82 08/21/2024     BMP:   Lab Results   Component Value Date    SODIUM 139 08/21/2024    K 3.9 08/21/2024     08/21/2024    CO2 26 08/21/2024    AGAP 9 08/21/2024    BUN 8 08/21/2024    CREATININE 0.71 08/21/2024    GLUC 87 08/21/2024    GLUF 75 08/08/2024    CALCIUM 9.1 08/21/2024    EGFR 115  "08/21/2024     CMP:   Lab Results   Component Value Date    SODIUM 139 08/21/2024    K 3.9 08/21/2024     08/21/2024    CO2 26 08/21/2024    AGAP 9 08/21/2024    BUN 8 08/21/2024    CREATININE 0.71 08/21/2024    GLUC 87 08/21/2024    GLUF 75 08/08/2024    CALCIUM 9.1 08/21/2024    AST 36 08/21/2024    ALT 25 08/21/2024    ALKPHOS 50 08/21/2024    TP 7.0 08/21/2024    ALB 4.1 08/21/2024    TBILI 0.32 08/21/2024    EGFR 115 08/21/2024     Lipid Profile:   Lab Results   Component Value Date    HDL 45 07/27/2015    TRIG 108 07/27/2015    LDLCALC 103 (H) 07/27/2015     Hemoglobin A1C: No results found for: \"HGBA1C\", \"EAG\"  Liver Enzymes:   Lab Results   Component Value Date    AST 36 08/21/2024    ALT 25 08/21/2024    ALKPHOS 50 08/21/2024    PROT 7.3 07/27/2015    BILITOT 0.17 (L) 07/27/2015     Thyroid Studies:   Lab Results   Component Value Date    ALF1BHPGMSYA 0.839 08/08/2024    FREET4 1.1 04/21/2014     Vitamin D Level No results found for: \"SDTH77MPLCUE\", \"IZIT301KDHO\"  Vitamin B12 No results found for: \"KAANYPVA41\"  EKG   Lab Results   Component Value Date    VENTRATE 88 08/21/2024    ATRIALRATE 88 08/21/2024    PRINT 150 08/21/2024    QRSDINT 82 08/21/2024    QTINT 334 08/21/2024    PAXIS 48 08/21/2024    QRSAXIS 82 08/21/2024    TWAVEAXIS 55 08/21/2024       Suicide/Homicide Risk Assessment:    Risk of Harm to Self:  The following ratings are based on assessment at the time of the interview  Demographic risk factors include:   Historical Risk Factors include: history of depression, history of anxiety  Recent Specific Risk Factors include: mental illness diagnosis, experienced fleeting suicidal ideation  Protective Factors: no current suicidal ideation, ability to adapt to change, access to mental health treatment, compliant with medications, compliant with mental health treatment, responsibilities and duties to others, restricted access to lethal means, stable living environment, stable job, " supportive family, supportive friends  Weapons: none. The following steps have been taken to ensure weapons are properly secured: not applicable  Based on today's assessment, Raiza presents the following risk of harm to self: minimal     Risk of Harm to Others:  The following ratings are based on assessment at the time of the interview  Demographic Risk Factors include: none.  Historical Risk Factors include: none.  Recent Specific Risk Factors include: none.  Protective Factors: no current homicidal ideation, ability to adapt to change, access to mental health treatment, compliant with medications, compliant with mental health treatment, responsibilities and duties to others, restricted access to lethal means, stable living environment, stable job, supportive family, supportive friends  Weapons: none. The following steps have been taken to ensure weapons are properly secured: not applicable  Based on today's assessment, Raiza presents the following risk of harm to others: minimal       The following interventions are recommended: no intervention changes needed      Assessment/Plan:      Diagnoses and all orders for this visit:    Generalized anxiety disorder  -     Discontinue: desvenlafaxine succinate (PRISTIQ) 50 mg 24 hr tablet; Take 1 tablet (50 mg total) by mouth daily  -     desvenlafaxine succinate (PRISTIQ) 50 mg 24 hr tablet; Take 1 tablet (50 mg total) by mouth daily    MDD (major depressive disorder), recurrent episode, moderate (HCC)  -     Discontinue: desvenlafaxine succinate (PRISTIQ) 50 mg 24 hr tablet; Take 1 tablet (50 mg total) by mouth daily  -     buPROPion (Wellbutrin XL) 150 mg 24 hr tablet; Take 1 tablet (150 mg total) by mouth daily  -     desvenlafaxine succinate (PRISTIQ) 50 mg 24 hr tablet; Take 1 tablet (50 mg total) by mouth daily          Assessment & Plan  Generalized anxiety disorder  Improved, at goal.    - Continue Pristiq 24 hour tablet 50 mg once daily for depressed mood,  anxiety  Orders:    desvenlafaxine succinate (PRISTIQ) 50 mg 24 hr tablet; Take 1 tablet (50 mg total) by mouth daily    MDD (major depressive disorder), recurrent episode, moderate (HCC)  Improved, at goal.    - Continue Wellbutrin  mg for adjunct for depressed mood  - Continue Pristiq 24 hour tablet 50 mg once daily for depressed mood, anxiety  Orders:    buPROPion (Wellbutrin XL) 150 mg 24 hr tablet; Take 1 tablet (150 mg total) by mouth daily    desvenlafaxine succinate (PRISTIQ) 50 mg 24 hr tablet; Take 1 tablet (50 mg total) by mouth daily       Patient to follow-up in 3 months.   Aware of 24 hour and weekend coverage for urgent situations accessed by calling [facility] main practice number    Medications Risks/Benefits:    Risks, Benefits And Possible Side Effects Of Medications:  Risks, benefits, and possible side effects of medications explained to Raiza and she verbalizes understanding and agreement for treatment.    Controlled Medication Discussion:   Not applicable - controlled prescriptions are not prescribed by this practice    Treatment Plan:  Completed and signed during the session: Not applicable - Treatment Plan not due at this session    This note was not shared with the patient due to reasonable likelihood of causing patient harm     Visit Time  Visit Start Time: 5:00 PM  Visit Stop Time: 5:30 PM  Total Visit Duration:  30 minutes    Vee Chicas MD 10/03/24    This note has been constructed in part using a voice recognition system.   There may be translation, syntax, or grammatical errors. If you have any questions, please contact the dictating provider.

## 2024-11-01 ENCOUNTER — TELEPHONE (OUTPATIENT)
Dept: SURGERY | Facility: CLINIC | Age: 29
End: 2024-11-01

## 2024-11-04 ENCOUNTER — OFFICE VISIT (OUTPATIENT)
Dept: FAMILY MEDICINE CLINIC | Facility: CLINIC | Age: 29
End: 2024-11-04
Payer: COMMERCIAL

## 2024-11-04 VITALS
HEART RATE: 102 BPM | BODY MASS INDEX: 28.29 KG/M2 | TEMPERATURE: 98.2 F | OXYGEN SATURATION: 100 % | DIASTOLIC BLOOD PRESSURE: 72 MMHG | SYSTOLIC BLOOD PRESSURE: 114 MMHG | RESPIRATION RATE: 16 BRPM | WEIGHT: 169.8 LBS | HEIGHT: 65 IN

## 2024-11-04 DIAGNOSIS — J01.10 ACUTE NON-RECURRENT FRONTAL SINUSITIS: Primary | ICD-10-CM

## 2024-11-04 PROCEDURE — 99213 OFFICE O/P EST LOW 20 MIN: CPT | Performed by: NURSE PRACTITIONER

## 2024-11-04 NOTE — PROGRESS NOTES
Ambulatory Visit  Name: Raiza Vicente      : 1995      MRN: [id number]  Encounter Provider: COREY Krueger  Encounter Date: 2024   Encounter department: [facility]    Assessment & Plan  Acute non-recurrent frontal sinusitis  Worsening symptoms with unilateral R sided frontal sinus pain, significant sinus inflammation and redness.  Otc meds not helping.  Start augmentin bid x 7 days.  Suggest flonase, warm compress over forehead and eyes.  Pt instructed to call for reevaluation if sx worsen or persist.    Orders:    amoxicillin-clavulanate (AUGMENTIN) 875-125 mg per tablet; Take 1 tablet by mouth every 12 (twelve) hours for 7 days       History of Present Illness     Pt is a 29 y.o. female who is seen today for evaluation of uri symptoms.  Past medical history of obstructive sleep apnea, major depression, generalized anxiety disorder.  She started with symptoms on Wednesday and symptoms have worsened last night into today.  She has R sided frontal sinus pain, body aches, dry cough.  Denies fever, chills, sob.  Appetite is a bit diminished, denies n/v/d.  She is taking dayquil and ibuprofen but nothing is helping the sinus pain.          Review of Systems   Constitutional:  Positive for appetite change. Negative for chills, fatigue and fever.   HENT:  Positive for congestion, sinus pressure and sinus pain. Negative for ear pain, hearing loss, postnasal drip and sore throat.    Respiratory:  Positive for cough. Negative for chest tightness, shortness of breath and wheezing.    Cardiovascular:  Negative for chest pain and palpitations.   Gastrointestinal:  Negative for diarrhea, nausea and vomiting.   Musculoskeletal:  Positive for myalgias.   Neurological:  Positive for light-headedness (when she gets up initially) and headaches. Negative for dizziness.   Hematological:  Negative for adenopathy.   Psychiatric/Behavioral:  Negative for sleep disturbance.            Objective  "    /72 (BP Location: Left arm, Patient Position: Sitting, Cuff Size: Standard)   Pulse 102   Temp 98.2 °F (36.8 °C) (Tympanic)   Resp 16   Ht 5' 5\" (1.651 m)   Wt 77 kg (169 lb 12.8 oz)   SpO2 100%   BMI 28.26 kg/m²     Physical Exam  Vitals reviewed.   Constitutional:       General: She is awake. She is not in acute distress.     Appearance: Normal appearance. She is well-developed and well-groomed. She is not ill-appearing.   HENT:      Head: Normocephalic.      Right Ear: Hearing, tympanic membrane, ear canal and external ear normal. No middle ear effusion. Tympanic membrane is not bulging.      Left Ear: Hearing, tympanic membrane, ear canal and external ear normal.  No middle ear effusion. Tympanic membrane is not bulging.      Nose: Mucosal edema and congestion present.      Mouth/Throat:      Lips: Pink.      Mouth: Mucous membranes are moist. Mucous membranes are not dry.      Pharynx: No oropharyngeal exudate (postnasal drip) or posterior oropharyngeal erythema.      Tonsils: No tonsillar abscesses.   Eyes:      General: Lids are normal.      Conjunctiva/sclera: Conjunctivae normal.   Cardiovascular:      Rate and Rhythm: Normal rate and regular rhythm.      Heart sounds: Normal heart sounds. No murmur heard.  Pulmonary:      Effort: Pulmonary effort is normal. No accessory muscle usage or respiratory distress.      Breath sounds: Normal breath sounds. No decreased breath sounds, wheezing, rhonchi or rales.   Lymphadenopathy:      Head:      Right side of head: No submental, submandibular, tonsillar, preauricular, posterior auricular or occipital adenopathy.      Left side of head: No submental, submandibular, tonsillar, preauricular, posterior auricular or occipital adenopathy.      Cervical: No cervical adenopathy.   Skin:     General: Skin is warm and dry.   Neurological:      Mental Status: She is alert and oriented to person, place, and time.   Psychiatric:         Attention and " Perception: Attention normal.         Mood and Affect: Mood normal.         Speech: Speech normal.         Behavior: Behavior normal. Behavior is cooperative.         Thought Content: Thought content normal.         Cognition and Memory: Cognition normal.         Judgment: Judgment normal.

## 2024-11-05 ENCOUNTER — ANESTHESIA EVENT (OUTPATIENT)
Dept: PERIOP | Facility: HOSPITAL | Age: 29
End: 2024-11-05
Payer: COMMERCIAL

## 2024-11-05 ENCOUNTER — TELEPHONE (OUTPATIENT)
Age: 29
End: 2024-11-05

## 2024-11-05 NOTE — TELEPHONE ENCOUNTER
Patient calling in and asking for a note to be written up for her stating she will be out of work for a week due to Gallbladder surgery and asking for it to have a return to work date on the letter    Please send letter to patient via [employer]

## 2024-11-05 NOTE — PRE-PROCEDURE INSTRUCTIONS
Pre-Surgery Instructions:   Medication Instructions    amoxicillin-clavulanate (AUGMENTIN) 875-125 mg per tablet Continue until finished- per pt, will complete on  11/12    buPROPion (Wellbutrin XL) 150 mg 24 hr tablet Take day of surgery.    desvenlafaxine succinate (PRISTIQ) 50 mg 24 hr tablet Take day of surgery.    levonorgestrel-ethinyl estradiol (SEASONALE) 0.15-0.03 MG per tablet Take night before surgery    Multiple Vitamins-Calcium (ONE-A-DAY WOMENS FORMULA PO) Stop taking 7 days prior to surgery.   Medication instructions for day surgery reviewed. Please use only a sip of water to take your instructed medications. Avoid all over the counter vitamins, supplements and NSAIDS for one week prior to surgery per anesthesia guidelines. Tylenol is ok to take as needed.     You will receive a call one business day prior to surgery with an arrival time and hospital directions. If your surgery is scheduled on a Monday, the hospital will be calling you on the Friday prior to your surgery. If you have not heard from anyone by 8pm, please call the hospital supervisor through the hospital  at [phone]. ([place] [phone] or [place] [phone]).    Do not eat or drink anything after midnight the night before your surgery, including candy, mints, lifesavers, or chewing gum. Do not drink alcohol 24hrs before your surgery. Try not to smoke at least 24hrs before your surgery.       Follow the pre surgery showering instructions as listed in the “My Surgical Experience Booklet” or otherwise provided by your surgeon's office. Do not use a blade to shave the surgical area 1 week before surgery. It is okay to use a clean electric clippers up to 24 hours before surgery. Do not apply any lotions, creams, including makeup, cologne, deodorant, or perfumes after showering on the day of your surgery. Do not use dry shampoo, hair spray, hair gel, or any type of hair products.     No contact lenses, eye make-up, or  artificial eyelashes. Remove nail polish, including gel polish, and any artificial, gel, or acrylic nails if possible. Remove all jewelry including rings and body piercing jewelry.     Wear causal clothing that is easy to take on and off. Consider your type of surgery.    Keep any valuables, jewelry, piercings at home. Please bring any specially ordered equipment (sling, braces) if indicated.    Arrange for a responsible person to drive you to and from the hospital on the day of your surgery. Please confirm the visitor policy for the day of your procedure when you receive your phone call with an arrival time.     Call the surgeon's office with any new illnesses, exposures, or additional questions prior to surgery.    Please reference your “My Surgical Experience Booklet” for additional information to prepare for your upcoming surgery.

## 2024-11-12 ENCOUNTER — TELEPHONE (OUTPATIENT)
Dept: SURGERY | Facility: CLINIC | Age: 29
End: 2024-11-12

## 2024-11-12 NOTE — TELEPHONE ENCOUNTER
Patient called in as she is unable to see the note under her letters tab through [employer]. She has checked VIA her emmie as well as through a web browser. Patient is requesting if this can be sent through a [employer] message or email. Please call her if there are any questions or concerns.

## 2024-11-14 ENCOUNTER — HOSPITAL ENCOUNTER (OUTPATIENT)
Facility: HOSPITAL | Age: 29
Setting detail: OUTPATIENT SURGERY
Discharge: HOME | End: 2024-11-14
Attending: SURGERY | Admitting: SURGERY
Payer: COMMERCIAL

## 2024-11-14 ENCOUNTER — ANESTHESIA (OUTPATIENT)
Dept: PERIOP | Facility: HOSPITAL | Age: 29
End: 2024-11-14
Payer: COMMERCIAL

## 2024-11-14 VITALS
HEART RATE: 116 BPM | TEMPERATURE: 97.3 F | WEIGHT: 165 LBS | SYSTOLIC BLOOD PRESSURE: 136 MMHG | DIASTOLIC BLOOD PRESSURE: 85 MMHG | OXYGEN SATURATION: 96 % | BODY MASS INDEX: 27.49 KG/M2 | HEIGHT: 65 IN | RESPIRATION RATE: 18 BRPM

## 2024-11-14 DIAGNOSIS — K80.50 BILIARY COLIC: ICD-10-CM

## 2024-11-14 LAB
ALBUMIN SERPL BCG-MCNC: 4.2 G/DL (ref 3.5–5)
ALP SERPL-CCNC: 46 U/L (ref 34–104)
ALT SERPL W P-5'-P-CCNC: 24 U/L (ref 7–52)
ANION GAP SERPL CALCULATED.3IONS-SCNC: 10 MMOL/L (ref 4–13)
AST SERPL W P-5'-P-CCNC: 19 U/L (ref 13–39)
BILIRUB SERPL-MCNC: 0.42 MG/DL (ref 0.2–1)
BUN SERPL-MCNC: 5 MG/DL (ref 5–25)
CALCIUM SERPL-MCNC: 9.2 MG/DL (ref 8.4–10.2)
CHLORIDE SERPL-SCNC: 104 MMOL/L (ref 96–108)
CO2 SERPL-SCNC: 23 MMOL/L (ref 21–32)
CREAT SERPL-MCNC: 0.76 MG/DL (ref 0.6–1.3)
EXT PREGNANCY TEST URINE: NEGATIVE
EXT. CONTROL: NORMAL
GFR SERPL CREATININE-BSD FRML MDRD: 106 ML/MIN/1.73SQ M
GLUCOSE P FAST SERPL-MCNC: 75 MG/DL (ref 65–99)
GLUCOSE SERPL-MCNC: 75 MG/DL (ref 65–140)
POTASSIUM SERPL-SCNC: 3.8 MMOL/L (ref 3.5–5.3)
PROT SERPL-MCNC: 7.4 G/DL (ref 6.4–8.4)
SODIUM SERPL-SCNC: 137 MMOL/L (ref 135–147)

## 2024-11-14 PROCEDURE — NC001 PR NO CHARGE: Performed by: PHYSICIAN ASSISTANT

## 2024-11-14 PROCEDURE — 47562 LAPAROSCOPIC CHOLECYSTECTOMY: CPT | Performed by: SURGERY

## 2024-11-14 PROCEDURE — 88304 TISSUE EXAM BY PATHOLOGIST: CPT | Performed by: PATHOLOGY

## 2024-11-14 PROCEDURE — 80053 COMPREHEN METABOLIC PANEL: CPT | Performed by: SURGERY

## 2024-11-14 PROCEDURE — 81025 URINE PREGNANCY TEST: CPT | Performed by: SURGERY

## 2024-11-14 PROCEDURE — NC001 PR NO CHARGE: Performed by: SURGERY

## 2024-11-14 RX ORDER — FENTANYL CITRATE 50 UG/ML
INJECTION, SOLUTION INTRAMUSCULAR; INTRAVENOUS AS NEEDED
Status: DISCONTINUED | OUTPATIENT
Start: 2024-11-14 | End: 2024-11-14

## 2024-11-14 RX ORDER — LIDOCAINE HYDROCHLORIDE 10 MG/ML
INJECTION, SOLUTION EPIDURAL; INFILTRATION; INTRACAUDAL; PERINEURAL AS NEEDED
Status: DISCONTINUED | OUTPATIENT
Start: 2024-11-14 | End: 2024-11-14

## 2024-11-14 RX ORDER — HYDROMORPHONE HCL/PF 1 MG/ML
0.2 SYRINGE (ML) INJECTION EVERY 4 HOURS PRN
Refills: 0 | Status: DISCONTINUED | OUTPATIENT
Start: 2024-11-14 | End: 2024-11-14 | Stop reason: HOSPADM

## 2024-11-14 RX ORDER — PROPOFOL 10 MG/ML
INJECTION, EMULSION INTRAVENOUS AS NEEDED
Status: DISCONTINUED | OUTPATIENT
Start: 2024-11-14 | End: 2024-11-14

## 2024-11-14 RX ORDER — GLYCOPYRROLATE 0.2 MG/ML
INJECTION INTRAMUSCULAR; INTRAVENOUS AS NEEDED
Status: DISCONTINUED | OUTPATIENT
Start: 2024-11-14 | End: 2024-11-14

## 2024-11-14 RX ORDER — MIDAZOLAM HYDROCHLORIDE 2 MG/2ML
INJECTION, SOLUTION INTRAMUSCULAR; INTRAVENOUS AS NEEDED
Status: DISCONTINUED | OUTPATIENT
Start: 2024-11-14 | End: 2024-11-14

## 2024-11-14 RX ORDER — CEFAZOLIN SODIUM 2 G/50ML
2000 SOLUTION INTRAVENOUS
Status: COMPLETED | OUTPATIENT
Start: 2024-11-14 | End: 2024-11-14

## 2024-11-14 RX ORDER — OXYCODONE HYDROCHLORIDE 5 MG/1
5 TABLET ORAL EVERY 4 HOURS PRN
Qty: 10 TABLET | Refills: 0 | Status: SHIPPED | OUTPATIENT
Start: 2024-11-14

## 2024-11-14 RX ORDER — KETOROLAC TROMETHAMINE 30 MG/ML
INJECTION, SOLUTION INTRAMUSCULAR; INTRAVENOUS AS NEEDED
Status: DISCONTINUED | OUTPATIENT
Start: 2024-11-14 | End: 2024-11-14

## 2024-11-14 RX ORDER — FENTANYL CITRATE/PF 50 MCG/ML
50 SYRINGE (ML) INJECTION
Status: DISCONTINUED | OUTPATIENT
Start: 2024-11-14 | End: 2024-11-14 | Stop reason: HOSPADM

## 2024-11-14 RX ORDER — ONDANSETRON 2 MG/ML
4 INJECTION INTRAMUSCULAR; INTRAVENOUS ONCE AS NEEDED
Status: DISCONTINUED | OUTPATIENT
Start: 2024-11-14 | End: 2024-11-14 | Stop reason: HOSPADM

## 2024-11-14 RX ORDER — CEFAZOLIN SODIUM 2 G/50ML
2000 SOLUTION INTRAVENOUS
Status: DISCONTINUED | OUTPATIENT
Start: 2024-11-15 | End: 2024-11-14

## 2024-11-14 RX ORDER — ONDANSETRON 2 MG/ML
INJECTION INTRAMUSCULAR; INTRAVENOUS AS NEEDED
Status: DISCONTINUED | OUTPATIENT
Start: 2024-11-14 | End: 2024-11-14

## 2024-11-14 RX ORDER — METOCLOPRAMIDE HYDROCHLORIDE 5 MG/ML
10 INJECTION INTRAMUSCULAR; INTRAVENOUS ONCE AS NEEDED
Status: DISCONTINUED | OUTPATIENT
Start: 2024-11-14 | End: 2024-11-14 | Stop reason: HOSPADM

## 2024-11-14 RX ORDER — BUPIVACAINE HYDROCHLORIDE AND EPINEPHRINE 5; 5 MG/ML; UG/ML
INJECTION, SOLUTION PERINEURAL AS NEEDED
Status: DISCONTINUED | OUTPATIENT
Start: 2024-11-14 | End: 2024-11-14 | Stop reason: HOSPADM

## 2024-11-14 RX ORDER — NEOSTIGMINE METHYLSULFATE 1 MG/ML
INJECTION INTRAVENOUS AS NEEDED
Status: DISCONTINUED | OUTPATIENT
Start: 2024-11-14 | End: 2024-11-14

## 2024-11-14 RX ORDER — SODIUM CHLORIDE, SODIUM LACTATE, POTASSIUM CHLORIDE, CALCIUM CHLORIDE 600; 310; 30; 20 MG/100ML; MG/100ML; MG/100ML; MG/100ML
125 INJECTION, SOLUTION INTRAVENOUS CONTINUOUS
Status: DISCONTINUED | OUTPATIENT
Start: 2024-11-14 | End: 2024-11-14 | Stop reason: HOSPADM

## 2024-11-14 RX ORDER — ROCURONIUM BROMIDE 10 MG/ML
INJECTION, SOLUTION INTRAVENOUS AS NEEDED
Status: DISCONTINUED | OUTPATIENT
Start: 2024-11-14 | End: 2024-11-14

## 2024-11-14 RX ORDER — HYDROMORPHONE HYDROCHLORIDE 2 MG/ML
INJECTION, SOLUTION INTRAMUSCULAR; INTRAVENOUS; SUBCUTANEOUS AS NEEDED
Status: DISCONTINUED | OUTPATIENT
Start: 2024-11-14 | End: 2024-11-14

## 2024-11-14 RX ORDER — DEXAMETHASONE SODIUM PHOSPHATE 10 MG/ML
INJECTION, SOLUTION INTRAMUSCULAR; INTRAVENOUS AS NEEDED
Status: DISCONTINUED | OUTPATIENT
Start: 2024-11-14 | End: 2024-11-14

## 2024-11-14 RX ORDER — LORAZEPAM 2 MG/ML
1 INJECTION INTRAMUSCULAR ONCE
Status: COMPLETED | OUTPATIENT
Start: 2024-11-14 | End: 2024-11-14

## 2024-11-14 RX ORDER — HYDROCODONE BITARTRATE AND ACETAMINOPHEN 5; 325 MG/1; MG/1
1 TABLET ORAL EVERY 4 HOURS PRN
Refills: 0 | Status: DISCONTINUED | OUTPATIENT
Start: 2024-11-14 | End: 2024-11-14 | Stop reason: HOSPADM

## 2024-11-14 RX ADMIN — LIDOCAINE HYDROCHLORIDE 50 MG: 10 INJECTION, SOLUTION EPIDURAL; INFILTRATION; INTRACAUDAL; PERINEURAL at 14:46

## 2024-11-14 RX ADMIN — SODIUM CHLORIDE, SODIUM LACTATE, POTASSIUM CHLORIDE, AND CALCIUM CHLORIDE: .6; .31; .03; .02 INJECTION, SOLUTION INTRAVENOUS at 15:47

## 2024-11-14 RX ADMIN — ROCURONIUM BROMIDE 50 MG: 10 INJECTION, SOLUTION INTRAVENOUS at 14:46

## 2024-11-14 RX ADMIN — LORAZEPAM 1 MG: 2 INJECTION INTRAMUSCULAR; INTRAVENOUS at 16:19

## 2024-11-14 RX ADMIN — ONDANSETRON 4 MG: 2 INJECTION INTRAMUSCULAR; INTRAVENOUS at 15:23

## 2024-11-14 RX ADMIN — FENTANYL CITRATE 50 MCG: 50 INJECTION INTRAMUSCULAR; INTRAVENOUS at 14:46

## 2024-11-14 RX ADMIN — NEOSTIGMINE METHYLSULFATE 3 MG: 1 INJECTION INTRAVENOUS at 15:57

## 2024-11-14 RX ADMIN — DEXAMETHASONE SODIUM PHOSPHATE 10 MG: 10 INJECTION, SOLUTION INTRAMUSCULAR; INTRAVENOUS at 14:46

## 2024-11-14 RX ADMIN — HYDROMORPHONE HYDROCHLORIDE 1 MG: 2 INJECTION, SOLUTION INTRAMUSCULAR; INTRAVENOUS; SUBCUTANEOUS at 15:08

## 2024-11-14 RX ADMIN — MIDAZOLAM 2 MG: 1 INJECTION INTRAMUSCULAR; INTRAVENOUS at 14:41

## 2024-11-14 RX ADMIN — FENTANYL CITRATE 50 MCG: 50 INJECTION INTRAMUSCULAR; INTRAVENOUS at 14:53

## 2024-11-14 RX ADMIN — PROPOFOL 200 MG: 10 INJECTION, EMULSION INTRAVENOUS at 14:46

## 2024-11-14 RX ADMIN — GLYCOPYRROLATE 0.4 MG: 0.2 INJECTION, SOLUTION INTRAMUSCULAR; INTRAVENOUS at 15:57

## 2024-11-14 RX ADMIN — SODIUM CHLORIDE, SODIUM LACTATE, POTASSIUM CHLORIDE, AND CALCIUM CHLORIDE 125 ML/HR: .6; .31; .03; .02 INJECTION, SOLUTION INTRAVENOUS at 13:37

## 2024-11-14 RX ADMIN — CEFAZOLIN SODIUM 2000 MG: 2 SOLUTION INTRAVENOUS at 14:47

## 2024-11-14 RX ADMIN — KETOROLAC TROMETHAMINE 30 MG: 30 INJECTION, SOLUTION INTRAMUSCULAR; INTRAVENOUS at 15:47

## 2024-11-14 NOTE — ANESTHESIA PREPROCEDURE EVALUATION
Procedure:  CHOLECYSTECTOMY LAPAROSCOPIC W/ ROBOTICS (Abdomen)    Relevant Problems   NEURO/PSYCH   (+) Current moderate episode of major depressive disorder without prior episode (HCC)   (+) Generalized anxiety disorder      PULMONARY   (+) DARIEN (obstructive sleep apnea)        Physical Exam    Airway    Mallampati score: II  TM Distance: >3 FB  Neck ROM: full     Dental       Cardiovascular      Pulmonary      Other Findings  post-pubertal.      Anesthesia Plan  ASA Score- 2     Anesthesia Type- general with ASA Monitors.         Additional Monitors:     Airway Plan: ETT.           Plan Factors-    Chart reviewed.                      Induction- intravenous.    Postoperative Plan-         Informed Consent- Anesthetic plan and risks discussed with patient.  I personally reviewed this patient with the CRNA. Discussed and agreed on the Anesthesia Plan with the CRNA..

## 2024-11-14 NOTE — ANESTHESIA POSTPROCEDURE EVALUATION
Post-Op Assessment Note    CV Status:  Stable    Pain management: adequate       Mental Status:  Alert and awake   Hydration Status:  Euvolemic   PONV Controlled:  Controlled   Airway Patency:  Patent     Post Op Vitals Reviewed: Yes    No anethesia notable event occurred.    Staff: CRNA           Last Filed PACU Vitals:  Vitals Value Taken Time   Temp 97.7 °F (36.5 °C) 11/14/24 1613   Pulse 116 11/14/24 1614   /71 11/14/24 1613   Resp 19 11/14/24 1614   SpO2 96 % RA 11/14/24 1614   Vitals shown include unfiled device data.    Modified Yasemin:  No data recorded

## 2024-11-14 NOTE — DISCHARGE INSTR - AVS FIRST PAGE
After Surgery Instructions    ???When you return home, you may eat whatever you feel comfortable eating. It is common to not have much of an appetite. Do not force yourself to eat. Your appetite will usually return in 1 or 2 weeks. Some foods may still not agree with you, but this will usually pass in 4 to 6 weeks.  ???Resume all of your regular medications, including blood thinners and aspirin, after going home.  ???The day after surgery you may remove the dressings. Leave any skin tapes on the incisions in place. A dressing is then optional.  ???You may shower the day after surgery. Plain soap and water is fine. Special soaps, ointments, alcohol or peroxide is not necessary. No swimming in pools for 5 days, and do not go in the ocean until seen in the office.  ???You may become constipated, especially if taking pain medications, for the first 3 or 4 days. You may take any over the counter laxative. Trejo Milk of Magnesia is good to start.  ???You will be given a prescription for pain medication. Take it as needed only. You may also take any over the counter medication for more mild pain.  ???Immediately after surgery, you may ride in a car, climb steps and walk as tolerated. When you are pain free, it is Ok to drive. Be aware that you will tire out very easily for the first few days.  ???Do not lift anything over 15 pounds for one week. Otherwise, there are not specific limitations to your activity and you may resume normal activity as tolerated.   After surgery you may return back to work on 1-2 weeks if comfortable      Contact [facility] at [phone] if any of the following occur:    ???A fever over 101° that does not respond to Tylenol. A low grade fever is not unusual after surgery and is not necessarily a sign of infection.  ???Increasing abdominal pain. Some pain after surgery is normal. Pain that was improving but has now gotten increasingly worse may not be normal and should be  reported  ???Persistent nausea and vomiting.      IF YOU HAVE ANY QUESTIONS OR CONCERNS PLEASE FEEL FREE TO CONTACT US AT ANY TIME.

## 2024-11-14 NOTE — H&P
Biliary colic  I explained to her that all her symptoms seem to go along eith biliary tract disease and cholecystectomy would be in order. I discussed the procedure in detail including risks, benefits, alternatives, and what to expect post op.  She understands and is agreeable to proceed.     Plan: robot or laparoscopic cholecystectomy  Orders:    Ambulatory Referral to General Surgery        History of Present Illness     Raiza Vicente is a 29 y.o. female who presents with history of abdominal pain.  Epigastrc with radiation to the back after eating fatty or greasy meal.  2 significant attacks, associated nausea and vomiting, no fever, no chills, no jaundice.  Ultrasound documents gallstones        Review of Systems   Constitutional:  Negative for chills and fever.   HENT:  Negative for trouble swallowing and voice change.    Eyes:  Negative for pain and visual disturbance.   Respiratory:  Negative for cough and shortness of breath.    Cardiovascular:  Negative for chest pain and leg swelling.   Gastrointestinal:  Negative for abdominal pain, nausea and vomiting.   Endocrine: Negative for cold intolerance, heat intolerance, polydipsia, polyphagia and polyuria.   Genitourinary:  Negative for difficulty urinating and flank pain.   Musculoskeletal:  Negative for arthralgias and gait problem.   Skin:  Negative for rash and wound.   Allergic/Immunologic: Negative for food allergies.   Neurological:  Negative for seizures, syncope, weakness and headaches.   Hematological:  Negative for adenopathy.   Psychiatric/Behavioral:  Negative for confusion.    All other systems reviewed and are negative.     Past Medical History  Medical History        Past Medical History:   Diagnosis Date    COVID-19 1/27/2021    Depression      Exposure to influenza 2/1/2018         Surgical History         Past Surgical History:   Procedure Laterality Date    NO PAST SURGERIES             Family History         Family History   Problem  "Relation Age of Onset    No Known Problems Mother      Diabetes Father      Cervical cancer Sister      Depression Sister      No Known Problems Sister      No Known Problems Sister      No Known Problems Sister      No Known Problems Brother      Bipolar disorder Paternal Uncle           Medications Ordered Prior to Encounter          Current Outpatient Medications on File Prior to Visit   Medication Sig Dispense Refill    buPROPion (Wellbutrin XL) 150 mg 24 hr tablet Take 1 tablet (150 mg total) by mouth daily Do not start before August 5, 2024. 30 tablet 2    desvenlafaxine succinate 25 MG TB24 Take 1 tablet (25 mg total) by mouth daily Can increase to 2 tablets once daily after 7 days if tolerated well. 60 tablet 2    levonorgestrel-ethinyl estradiol (SEASONALE) 0.15-0.03 MG per tablet Take 1 tablet by mouth daily 91 tablet 3    Multiple Vitamins-Calcium (ONE-A-DAY WOMENS FORMULA PO) Take by mouth          No current facility-administered medications on file prior to visit.        Allergies   No Known Allergies           Objective[]Expand by Default     /87 (BP Location: Left arm, Patient Position: Sitting, Cuff Size: Standard)   Pulse (!) 107   Temp (!) 97.2 °F (36.2 °C) (Skin)   Ht 5' 5\" (1.651 m)   Wt 79.4 kg (175 lb)   LMP 08/23/2024 (Exact Date)   BMI 29.12 kg/m²      Physical Exam  Vitals and nursing note reviewed.   Constitutional:       General: She is not in acute distress.     Appearance: She is well-developed. She is not diaphoretic.   HENT:      Head: Normocephalic and atraumatic.      Right Ear: External ear normal.      Left Ear: External ear normal.   Eyes:      General: No scleral icterus.     Conjunctiva/sclera: Conjunctivae normal.   Neck:      Thyroid: No thyromegaly.      Trachea: No tracheal deviation.   Cardiovascular:      Rate and Rhythm: Normal rate and regular rhythm.      Heart sounds: Normal heart sounds. No murmur heard.     No friction rub.   Pulmonary:      Effort: " "Pulmonary effort is normal. No respiratory distress.      Breath sounds: Normal breath sounds. No wheezing or rales.   Abdominal:      General: There is no distension.      Palpations: Abdomen is soft. There is no mass.      Tenderness: There is no abdominal tenderness. There is no guarding or rebound.   Musculoskeletal:         General: Normal range of motion.      Cervical back: Neck supple.   Lymphadenopathy:      Cervical: No cervical adenopathy.   Skin:     General: Skin is warm and dry.   Neurological:      Mental Status: She is alert and oriented to person, place, and time.   Psychiatric:         Behavior: Behavior normal.         Thought Content: Thought content normal.         Judgment: Judgment normal.    /68   Pulse 91   Temp 97.8 °F (36.6 °C) (Temporal)   Resp 16   Ht 5' 5\" (1.651 m)   Wt 74.8 kg (165 lb)   LMP 11/06/2024   SpO2 96%   BMI 27.46 kg/m²     "

## 2024-11-14 NOTE — OP NOTE
OPERATIVE REPORT  PATIENT NAME: Raiza Vicente    :  1995  MRN: [id number]  Pt Location: [facility] OR ROOM 14    SURGERY DATE: 2024    Surgeons and Role:     * Carlos Alberto Santacruz MD - Primary     * Ludmila Rosales PA-C     * Nba Kenny MD    Preop Diagnosis:  Biliary colic [K80.50]    Post-Op Diagnosis Codes:     * Biliary colic [K80.50]    Procedure(s):  CHOLECYSTECTOMY LAPAROSCOPIC W/ ROBOTICS    Specimen(s):  ID Type Source Tests Collected by Time Destination   1 : Gallblader Tissue Gallbladder TISSUE EXAM Carlos Alberto Santacruz MD 2024 1540        Estimated Blood Loss:   Minimal    Drains:  * No LDAs found *    Anesthesia Type:   General    Operative Indications:  Biliary colic [K80.50]      Operative Findings:  Critical view obtained  3 arm technique    Complications:   None    Procedure and Technique:  The patient was brought to the operating arena and placed in supine position. All regular monitoring devices were connected. The patient underwent general anesthesia with endotracheal intubation without complication. The patient received perioperative antibiotics. The patient received subcutaneous heparin in addition to bilateral lower extremity sequential compression devices for DVT prophylaxis. A timeout was performed prior to incision to ensure correct patient position, procedure, and site.    We began by using local to numb an area inferior to the umbilicus.  We made an 8 mm incision and used a Veress needle to gain access to the abdomen.  We insufflated the abdomen to 15 mmHg and then introduced a 8 mm trocar.  Inspection with the camera revealed no injury on entry.  We then placed an additional 12 mm trocar in the left midabdomen and an additional 8 mm trocar right mid abdomen.     We began our dissection at the infundibulum of the gallbladder.  We were able to isolate the cystic duct and cystic artery and obtained a critical view of safety.  We triply clipped the cystic duct and then divided  this with scissors. Cystic artery was divided with electrocautery and remained hemostatic.    We then removed the gallbladder from the liver bed.  Of note we did inadvertently enter the gallbladder with spillage of bile.  The gallbladder was removed from the liver bed and removed in a bag.    We thoroughly irrigated the right upper quadrant with a suction  until the effluent ran clear.  Further inspection of the liver bed showed it was hemostatic.    The 12 mm trocar site was closed with laparoscopic suture passer and Vicryl suture.  The additional incision was closed with Monocryl and glue.    The patient tolerated procedure well was taken to the post anesthesia care unit in stable condition. All lap, needle, and instrument counts were correct.    Dr. Santacruz was present for the entire procedure.     Patient Disposition:  PACU              SIGNATURE: Nba Kenny MD  DATE: November 14, 2024  TIME: 4:15 PM

## 2024-11-18 PROCEDURE — 88304 TISSUE EXAM BY PATHOLOGIST: CPT | Performed by: PATHOLOGY

## 2024-11-20 ENCOUNTER — OFFICE VISIT (OUTPATIENT)
Dept: SURGERY | Facility: CLINIC | Age: 29
End: 2024-11-20

## 2024-11-20 DIAGNOSIS — Z90.49 STATUS POST LAPAROSCOPIC CHOLECYSTECTOMY: Primary | ICD-10-CM

## 2024-11-20 PROCEDURE — 99024 POSTOP FOLLOW-UP VISIT: CPT | Performed by: SURGERY

## 2024-11-20 NOTE — PROGRESS NOTES
Name: Raiza Vicente      : 1995      MRN: [id number]  Encounter Provider: Carlos Alberto Santacruz MD  Encounter Date: 2024   [facility]  :  Assessment & Plan  Status post laparoscopic cholecystectomy  Overall doing well.  I told her the pain she is having on the left side is because that was the largest incision that had that deeper sutures placed.  Increase activity as she is feels comfortable.  See back here if needed.           History of Present Illness     HPI  Raiza Vicente is a 29 y.o. female who presents status post robotic cholecystectomy.  Biggest complaint is left sided pain.  Otherwise tolerating diet bowels are working satisfactorily.      Review of Systems       Objective   LMP 2024      Physical Exam  Abdomen: Incisions healing well soft, soft, nontender

## 2024-12-01 ENCOUNTER — NURSE TRIAGE (OUTPATIENT)
Dept: OTHER | Facility: OTHER | Age: 29
End: 2024-12-01

## 2024-12-01 NOTE — TELEPHONE ENCOUNTER
"Answer Assessment - Initial Assessment Questions  1. SYMPTOM: \"What's the main symptom you're concerned about?\" (e.g., pain, fever, vomiting)      Pain  \"stabbing\" at site of left incision site  Going into back    2. ONSET: \"When did pian  start?\"      Thursday    3. SURGERY: \"What surgery did you have?\"      Cholecystectomy    4. DATE of SURGERY: \"When was the surgery?\"       Nov 14    5. ANESTHESIA: \"What type of anesthesia did you have?\" (e.g., general, spinal, epidural, local)      N/A    6. DRAINS: \"Were any drains place in or around the wound?\" (e.g., Hemovac, Kirill-Mac, Penrose)      Denies    7. PAIN: \"Is there any pain?\" If Yes, ask: \"How bad is it?\"  (Scale 1-10; or mild, moderate, severe)      7/10    8. FEVER: \"Do you have a fever?\" If Yes, ask: \"What is your temperature, how was it measured, and when did it start?\"      Denies    9. VOMITING: \"Is there any vomiting?\" If Yes, ask: \"How many times?\"      Nausea    10. BLEEDING: \"Is there any bleeding?\" If Yes, ask: \"How much?\" and \"Where?\"        Denies    11. OTHER SYMPTOMS: \"Do you have any other symptoms?\" (e.g., drainage from wound, painful urination, constipation)        Denies    Pain initially waxed and waned; now constant    Motrin 600mg every 3-6 hours over past day; does not notice a change  Last dose approx 1.5 hours ago    Protocols used: Post-Op Symptoms and Questions-Adult-      On call provider recommended ED for pain management or to f/u with office Monday.  Patient opt'd for follow up.  Reviewed S/s for proceeding to ED.  Pt verbalized understanding.    Please follow up with patient Monday.  "

## 2024-12-01 NOTE — TELEPHONE ENCOUNTER
Reason for Disposition  • [1] MILD-MODERATE post-op pain (e.g., pain scale 1-7) AND [2] not controlled with pain medications    Protocols used: Post-Op Symptoms and Questions-Adult-AH

## 2024-12-02 ENCOUNTER — TELEPHONE (OUTPATIENT)
Age: 29
End: 2024-12-02

## 2024-12-02 NOTE — TELEPHONE ENCOUNTER
Patient return call from office. Warm transferred to [facility] for further assistance.

## 2024-12-03 ENCOUNTER — OFFICE VISIT (OUTPATIENT)
Dept: SURGERY | Facility: CLINIC | Age: 29
End: 2024-12-03

## 2024-12-03 VITALS
HEART RATE: 109 BPM | DIASTOLIC BLOOD PRESSURE: 84 MMHG | WEIGHT: 167 LBS | SYSTOLIC BLOOD PRESSURE: 119 MMHG | BODY MASS INDEX: 27.82 KG/M2 | HEIGHT: 65 IN

## 2024-12-03 DIAGNOSIS — Z90.49 STATUS POST LAPAROSCOPIC CHOLECYSTECTOMY: Primary | ICD-10-CM

## 2024-12-03 PROCEDURE — 99024 POSTOP FOLLOW-UP VISIT: CPT | Performed by: SURGERY

## 2024-12-03 NOTE — ASSESSMENT & PLAN NOTE
I told her I do occasionally see some pain in the left side.  I told her it would be little unusual for her to have diverticulitis at her age.  Some of that I think is related to the surgical closure of the larger incision in the left upper quadrant.  I told her to go back on liquids for couple days if it is a bowel issue gradually get better as time goes on.  Tylenol or ibuprofen for pain.  Call if things do not resolve in a few days.

## 2024-12-03 NOTE — PROGRESS NOTES
"Name: Raiza Vicente      : 1995      MRN: [id number]  Encounter Provider: Carlos Alberto Santacruz MD  Encounter Date: 12/3/2024   Encounter department: [facility] GENERAL SURGERY [place]  :  Assessment & Plan  Status post laparoscopic cholecystectomy  I told her I do occasionally see some pain in the left side.  I told her it would be little unusual for her to have diverticulitis at her age.  Some of that I think is related to the surgical closure of the larger incision in the left upper quadrant.  I told her to go back on liquids for couple days if it is a bowel issue gradually get better as time goes on.  Tylenol or ibuprofen for pain.  Call if things do not resolve in a few days.           History of Present Illness     HPI  Raiza Vicente is a 29 y.o. female who presents status post robotic cholecystectomy.  Has been complaining of a lot more pain on the left side.  Below her incision and left lower quadrant.  Also nausea associated with this not feeling as well as she had been      Review of Systems       Objective   /84 (BP Location: Right arm, Patient Position: Sitting, Cuff Size: Standard)   Pulse (!) 109   Ht 5' 5\" (1.651 m)   Wt 75.8 kg (167 lb)   LMP 2024   BMI 27.79 kg/m²      Physical Exam  Abdomen: Incisions healing well, abdomen is just below her left upper quadrant incision, slight tenderness along her sigmoid colon which is palpable in the left lower quadrant.    "

## 2024-12-06 ENCOUNTER — HOSPITAL ENCOUNTER (EMERGENCY)
Facility: HOSPITAL | Age: 29
Discharge: HOME | End: 2024-12-06
Attending: EMERGENCY MEDICINE
Payer: COMMERCIAL

## 2024-12-06 ENCOUNTER — APPOINTMENT (EMERGENCY)
Dept: CT IMAGING | Facility: HOSPITAL | Age: 29
End: 2024-12-06
Payer: COMMERCIAL

## 2024-12-06 ENCOUNTER — TELEPHONE (OUTPATIENT)
Dept: SURGERY | Facility: CLINIC | Age: 29
End: 2024-12-06

## 2024-12-06 VITALS
HEART RATE: 81 BPM | RESPIRATION RATE: 17 BRPM | SYSTOLIC BLOOD PRESSURE: 115 MMHG | TEMPERATURE: 98.1 F | DIASTOLIC BLOOD PRESSURE: 69 MMHG | OXYGEN SATURATION: 99 %

## 2024-12-06 DIAGNOSIS — R10.32 LEFT LOWER QUADRANT ABDOMINAL PAIN: Primary | ICD-10-CM

## 2024-12-06 LAB
ALBUMIN SERPL BCG-MCNC: 4.1 G/DL (ref 3.5–5)
ALP SERPL-CCNC: 47 U/L (ref 34–104)
ALT SERPL W P-5'-P-CCNC: 17 U/L (ref 7–52)
ANION GAP SERPL CALCULATED.3IONS-SCNC: 8 MMOL/L (ref 4–13)
AST SERPL W P-5'-P-CCNC: 16 U/L (ref 13–39)
BASOPHILS # BLD AUTO: 0.04 THOUSANDS/ÂΜL (ref 0–0.1)
BASOPHILS NFR BLD AUTO: 1 % (ref 0–1)
BILIRUB SERPL-MCNC: 0.4 MG/DL (ref 0.2–1)
BILIRUB UR QL STRIP: NEGATIVE
BUN SERPL-MCNC: 7 MG/DL (ref 5–25)
CALCIUM SERPL-MCNC: 8.9 MG/DL (ref 8.4–10.2)
CHLORIDE SERPL-SCNC: 102 MMOL/L (ref 96–108)
CLARITY UR: CLEAR
CO2 SERPL-SCNC: 26 MMOL/L (ref 21–32)
COLOR UR: NORMAL
CREAT SERPL-MCNC: 0.74 MG/DL (ref 0.6–1.3)
EOSINOPHIL # BLD AUTO: 0.08 THOUSAND/ÂΜL (ref 0–0.61)
EOSINOPHIL NFR BLD AUTO: 1 % (ref 0–6)
ERYTHROCYTE [DISTWIDTH] IN BLOOD BY AUTOMATED COUNT: 12.2 % (ref 11.6–15.1)
EXT PREGNANCY TEST URINE: NEGATIVE
EXT. CONTROL: NORMAL
GFR SERPL CREATININE-BSD FRML MDRD: 109 ML/MIN/1.73SQ M
GLUCOSE SERPL-MCNC: 80 MG/DL (ref 65–140)
GLUCOSE UR STRIP-MCNC: NEGATIVE MG/DL
HCT VFR BLD AUTO: 41.1 % (ref 34.8–46.1)
HGB BLD-MCNC: 13.8 G/DL (ref 11.5–15.4)
HGB UR QL STRIP.AUTO: NEGATIVE
IMM GRANULOCYTES # BLD AUTO: 0.02 THOUSAND/UL (ref 0–0.2)
IMM GRANULOCYTES NFR BLD AUTO: 0 % (ref 0–2)
KETONES UR STRIP-MCNC: NEGATIVE MG/DL
LEUKOCYTE ESTERASE UR QL STRIP: NEGATIVE
LIPASE SERPL-CCNC: 43 U/L (ref 11–82)
LYMPHOCYTES # BLD AUTO: 2.2 THOUSANDS/ÂΜL (ref 0.6–4.47)
LYMPHOCYTES NFR BLD AUTO: 28 % (ref 14–44)
MCH RBC QN AUTO: 30.5 PG (ref 26.8–34.3)
MCHC RBC AUTO-ENTMCNC: 33.6 G/DL (ref 31.4–37.4)
MCV RBC AUTO: 91 FL (ref 82–98)
MONOCYTES # BLD AUTO: 0.65 THOUSAND/ÂΜL (ref 0.17–1.22)
MONOCYTES NFR BLD AUTO: 8 % (ref 4–12)
NEUTROPHILS # BLD AUTO: 4.9 THOUSANDS/ÂΜL (ref 1.85–7.62)
NEUTS SEG NFR BLD AUTO: 62 % (ref 43–75)
NITRITE UR QL STRIP: NEGATIVE
NRBC BLD AUTO-RTO: 0 /100 WBCS
PH UR STRIP.AUTO: 6.5 [PH]
PLATELET # BLD AUTO: 408 THOUSANDS/UL (ref 149–390)
PMV BLD AUTO: 8.9 FL (ref 8.9–12.7)
POTASSIUM SERPL-SCNC: 3.4 MMOL/L (ref 3.5–5.3)
PROT SERPL-MCNC: 7.3 G/DL (ref 6.4–8.4)
PROT UR STRIP-MCNC: NEGATIVE MG/DL
RBC # BLD AUTO: 4.53 MILLION/UL (ref 3.81–5.12)
SODIUM SERPL-SCNC: 136 MMOL/L (ref 135–147)
SP GR UR STRIP.AUTO: 1.01 (ref 1–1.03)
UROBILINOGEN UR STRIP-ACNC: <2 MG/DL
WBC # BLD AUTO: 7.89 THOUSAND/UL (ref 4.31–10.16)

## 2024-12-06 PROCEDURE — 99285 EMERGENCY DEPT VISIT HI MDM: CPT | Performed by: EMERGENCY MEDICINE

## 2024-12-06 PROCEDURE — 99284 EMERGENCY DEPT VISIT MOD MDM: CPT

## 2024-12-06 PROCEDURE — 83690 ASSAY OF LIPASE: CPT

## 2024-12-06 PROCEDURE — 74177 CT ABD & PELVIS W/CONTRAST: CPT

## 2024-12-06 PROCEDURE — 80053 COMPREHEN METABOLIC PANEL: CPT

## 2024-12-06 PROCEDURE — 36415 COLL VENOUS BLD VENIPUNCTURE: CPT

## 2024-12-06 PROCEDURE — 85025 COMPLETE CBC W/AUTO DIFF WBC: CPT

## 2024-12-06 PROCEDURE — 81003 URINALYSIS AUTO W/O SCOPE: CPT | Performed by: EMERGENCY MEDICINE

## 2024-12-06 PROCEDURE — 81025 URINE PREGNANCY TEST: CPT

## 2024-12-06 RX ORDER — POTASSIUM CHLORIDE 1500 MG/1
20 TABLET, EXTENDED RELEASE ORAL ONCE
Status: COMPLETED | OUTPATIENT
Start: 2024-12-06 | End: 2024-12-06

## 2024-12-06 RX ADMIN — POTASSIUM CHLORIDE 20 MEQ: 1500 TABLET, EXTENDED RELEASE ORAL at 14:16

## 2024-12-06 RX ADMIN — IOHEXOL 80 ML: 350 INJECTION, SOLUTION INTRAVENOUS at 13:52

## 2024-12-06 NOTE — Clinical Note
Raiza Vicente was seen and treated in our emergency department on 12/6/2024.    No restrictions            Diagnosis:     Raiza  may return to work on return date.    She may return on this date: 12/07/2024         If you have any questions or concerns, please don't hesitate to call.      Melita Valiente MD    ______________________________           _______________          _______________  Hospital Representative                              Date                                Time

## 2024-12-06 NOTE — TELEPHONE ENCOUNTER
Left her a message that if she is having that much pain, I will order a CT scan to evaluate things more fully

## 2024-12-06 NOTE — ED ATTENDING ATTESTATION
12/6/2024  I, Fernando Mcwilliams MD, saw and evaluated the patient. I have discussed the patient with the resident/non-physician practitioner and agree with the resident's/non-physician practitioner's findings, Plan of Care, and MDM as documented in the resident's/non-physician practitioner's note, except where noted. All available labs and Radiology studies were reviewed.  I was present for key portions of any procedure(s) performed by the resident/non-physician practitioner and I was immediately available to provide assistance.       At this point I agree with the current assessment done in the Emergency Department.  I have conducted an independent evaluation of this patient a history and physical is as follows:  Briefly, 29-year-old female presenting with abdominal pain.  Patient had a laparoscopic cholecystectomy on 11/14, was doing well after that, but over the past several days has had new pain in the left lower quadrant.  Pain is dull, moderate to severe, worse with moving or twisting, also worse with eating, better at rest and with a clear liquid diet.  Complains of nausea but denies vomiting, trauma, fever, chest pain, shortness of breath, changes in urine or bowel movements, vaginal bleeding or discharge, other symptoms.  On examination, tender to palpation left lower quadrant but no tenderness elsewhere, operative sites clean dry and intact without evidence of infection, abdomen overall benign.  Heart sounds normal, lungs clear to auscultation.  Patient declining pain medications in emergency department.  Labs reassuring, CT likewise reassuring.  Overall, some concern for musculoskeletal etiology, possibly strain of lower rectus sheath or hip flexor.  Discharged with strict return precautions and follow-up primary care doctor as well as surgeon.  ED Course         Critical Care Time  Procedures

## 2024-12-06 NOTE — DISCHARGE INSTRUCTIONS
Please follow-up with your PCP. Please return to the ER if you develop worsening pain, decreased ability to eat, fevers, nausea, or vomiting. Fortunately, your work up today did not reveal any infection or metabolic abnormality. Your CT imaging did not show any findings causing this pain. You can continue to eat foods as tolerated and advance your diet. Please reach out to your surgeon if you have any addition questions or concerns.

## 2024-12-06 NOTE — ED PROVIDER NOTES
Time reflects when diagnosis was documented in both MDM as applicable and the Disposition within this note       Time User Action Codes Description Comment    12/6/2024  4:04 PM Melita Valiente Add [R10.32] Left lower quadrant abdominal pain           ED Disposition       ED Disposition   Discharge    Condition   Stable    Date/Time   Fri Dec 6, 2024  4:04 PM    Comment   Raizaavani Vicente discharge to home/self care.                   Assessment & Plan       Medical Decision Making  30 yo female who recently had a lap cholecystectomy on 11/14/24 presenting with LLQ pain near her largest incision site that worsens with turning movements or laughing. She denies pain at rest. She has also been experiencing constipation. No fevers or blood in her stool. She is hemodynamically stable. The pain somewhat improved with a clear liquid diet. On the differential diagnosis includes constipation, MSK etiology, diverticulitis, diverticulosis, gastroenteritis, post op pain vs. surgical closure pain (left incision was largest one with deeper sutures placed), ovarian cyst, pregnancy. See workup below for further evaluation in ED. CT abdomen pelvis with contrast did not show any acute findings in the abdomen or pelvis to explain the patient's symptoms, there were post op changes and a small amount of crescentic hyper attenuating material in gallbladder fossa.     Upon re-evaluation x 2, patient did not want any pain medications and was not in any acute pain in bed. Patient can continue alternating tylenol and advil for pain as needed while at home and advance her diet as tolerated. She will follow-up with her PCP and surgeon. Strict return to ER precautions were given to patient.       Amount and/or Complexity of Data Reviewed  Labs: ordered. Decision-making details documented in ED Course.  Radiology: ordered.    Risk  Prescription drug management.        ED Course as of 12/06/24 1605   Fri Dec 06, 2024   1244 CBC notable for normal WBC,  elevated platelets-similar value from prior lab testing  Lipase within normal range  CMP notable for potassium 3.4    1329 UA unremarkable, no bacteria    1330 Pregnancy testing negative    1416 Potassium 20 mEq given for hypokalemia    1444 CT abdomen pelvis with contrast showing no acute findings in the abdomen or pelvis to explain the patient's symptoms. Postsurgical changes from recent cholecystectomy. Small amount of crescentic hyperattenuating material in the gallbladder fossa measuring 1.3 x 1.0 cm could be due to a postoperative hematoma/seroma.            Medications   iohexol (OMNIPAQUE) 350 MG/ML injection (SINGLE-DOSE) 80 mL (80 mL Intravenous Given 12/6/24 1352)   potassium chloride (Klor-Con M20) CR tablet 20 mEq (20 mEq Oral Given 12/6/24 1416)       ED Risk Strat Scores                                               History of Present Illness       Chief Complaint   Patient presents with    Abdominal Pain     Pt reports LLQ pain, slight relief  with clear liquid diet attempted whole foods yesterday and pain worsened, nausea, gallbladder surgery 11/14       Past Medical History:   Diagnosis Date    Anxiety 2014    COVID-19 01/27/2021    Depression     Exposure to influenza 02/01/2018      Past Surgical History:   Procedure Laterality Date    NO PAST SURGERIES      OK LAPAROSCOPY SURG CHOLECYSTECTOMY N/A 11/14/2024    Procedure: CHOLECYSTECTOMY LAPAROSCOPIC W/ ROBOTICS;  Surgeon: Carlos Alberto Santacruz MD;  Location: [facility];  Service: General      Family History   Problem Relation Age of Onset    No Known Problems Mother     Diabetes Father     Cervical cancer Sister     Cancer Sister     Depression Sister     No Known Problems Sister     No Known Problems Sister     No Known Problems Sister     No Known Problems Brother     Bipolar disorder Paternal Uncle     Alcohol abuse Maternal Uncle     Mental illness Paternal Uncle         Bipolar    Bipolar disorder Paternal Uncle     Depression Sister     Anxiety  disorder Sister       Social History     Tobacco Use    Smoking status: Never     Passive exposure: Never    Smokeless tobacco: Never   Vaping Use    Vaping status: Never Used   Substance Use Topics    Alcohol use: Yes     Alcohol/week: 1.0 standard drink of alcohol     Types: 1 Cans of beer per week     Comment: Socially drink    Drug use: No      E-Cigarette/Vaping    E-Cigarette Use Never User       E-Cigarette/Vaping Substances    Nicotine No     THC No     CBD No     Flavoring No     Other No       I have reviewed and agree with the history as documented.     28 yo female with PMH recent lap cholecystectomy on 11/14/24 presenting with pain in her LLQ that is non-radiating. The pain worsens with movements and sitting up straight or laughing. She denies pain at rest. She was doing well initially after the procedure and eating a full diet until she started having worsening pain near her largest incision site. She saw her surgeon on 12/3 who put her back on a clear liquid diet. Her pain initially improved until she had some zucchini with rice last night. However, overall since Sunday her pain has improved. She alternates between Tylenol and advil but does not take each regularly on the dot. The pain is characterized as sharp stabbing pain when bending over or changing positions. She reports some nausea from not eating a lot but no vomiting. She also has been experiencing constipation which is different for her, but had a loose BM last night. Endorses adequate hydration. No fever, does has chills occasionally but not classic rigors. No overt blood in her stool. She had some pain with defecation but this has improved. Her surgery was performed for history of gallstone and colicky pain. No vaginal discharge, pain, or bleeding. Her LMP was 2 months ago and she gets her period every 3 months. She did miss one of her pills before the surgery and had some spotting but none since.         Abdominal Pain  Associated  symptoms: constipation, diarrhea and nausea    Associated symptoms: no chest pain, no chills, no cough, no fever, no hematuria, no shortness of breath, no vaginal bleeding, no vaginal discharge and no vomiting        Review of Systems   Constitutional:  Negative for appetite change, chills and fever.   HENT:  Negative for congestion.    Eyes:  Negative for visual disturbance.   Respiratory:  Negative for cough and shortness of breath.    Cardiovascular:  Positive for palpitations. Negative for chest pain.   Gastrointestinal:  Positive for abdominal pain, constipation, diarrhea and nausea. Negative for anal bleeding, blood in stool and vomiting.   Genitourinary:  Negative for decreased urine volume, difficulty urinating, hematuria, vaginal bleeding, vaginal discharge and vaginal pain.   Musculoskeletal:  Negative for back pain and myalgias.   Neurological:  Negative for dizziness, light-headedness and headaches.   All other systems reviewed and are negative.          Objective       ED Triage Vitals   Temperature Pulse Blood Pressure Respirations SpO2 Patient Position - Orthostatic VS   12/06/24 1014 12/06/24 1013 12/06/24 1013 12/06/24 1013 12/06/24 1013 12/06/24 1013   98.1 °F (36.7 °C) 91 128/83 16 100 % Sitting      Temp Source Heart Rate Source BP Location FiO2 (%) Pain Score    12/06/24 1014 12/06/24 1013 12/06/24 1345 -- 12/06/24 1013    Oral Monitor Left arm  4      Vitals      Date and Time Temp Pulse SpO2 Resp BP Pain Score FACES Pain Rating User   12/06/24 1430 -- -- -- -- -- No Pain --    12/06/24 1345 -- 81 99 % 17 115/69 -- -- JV   12/06/24 1328 -- -- -- -- -- 2 -- JV   12/06/24 1222 -- 88 100 % 16 121/74 -- -- JV   12/06/24 1211 -- -- -- -- -- 3 -- JV   12/06/24 1115 -- -- -- -- -- 3 abd -- J   12/06/24 1014 98.1 °F (36.7 °C) -- -- -- -- -- -- AB   12/06/24 1013 -- 91 100 % 16 128/83 4 -- AB            Physical Exam  Vitals reviewed.   Constitutional:       Appearance: She is not diaphoretic.    HENT:      Head: Normocephalic and atraumatic.      Mouth/Throat:      Mouth: Mucous membranes are moist.   Eyes:      General: No scleral icterus.  Cardiovascular:      Rate and Rhythm: Normal rate and regular rhythm.   Pulmonary:      Effort: Pulmonary effort is normal. No respiratory distress.      Breath sounds: Normal breath sounds.   Abdominal:      General: Abdomen is flat. Bowel sounds are normal.      Palpations: Abdomen is soft.      Tenderness: There is abdominal tenderness in the left lower quadrant. There is no right CVA tenderness, left CVA tenderness, guarding or rebound. Negative signs include Rovsing's sign.      Comments: Incisions look clean dry and intact. Pain in LLQ, no rebound or guarding.  Pain somewhat worsened with flexion of left hip.   No significant mass noted   Musculoskeletal:      Right lower leg: No edema.      Left lower leg: No edema.   Skin:     General: Skin is warm and dry.   Neurological:      General: No focal deficit present.      Mental Status: She is alert.   Psychiatric:         Mood and Affect: Mood normal.         Behavior: Behavior normal.         Results Reviewed       Procedure Component Value Units Date/Time    UA w Reflex to Microscopic w Reflex to Culture [[id number]] Collected: 12/06/24 1239    Lab Status: Final result Specimen: Urine, Clean Catch Updated: 12/06/24 1248     Color, UA Light Yellow     Clarity, UA Clear     Specific Gravity, UA 1.007     pH, UA 6.5     Leukocytes, UA Negative     Nitrite, UA Negative     Protein, UA Negative mg/dl      Glucose, UA Negative mg/dl      Ketones, UA Negative mg/dl      Urobilinogen, UA <2.0 mg/dl      Bilirubin, UA Negative     Occult Blood, UA Negative    POCT pregnancy, urine [[id number]]  (Normal) Collected: 12/06/24 1241    Lab Status: Final result Updated: 12/06/24 1241     EXT Preg Test, Ur Negative     Control Valid    Comprehensive metabolic panel [[id number]]  (Abnormal) Collected: 12/06/24 1132    Lab Status:  Final result Specimen: Blood from Arm, Left Updated: 12/06/24 1157     Sodium 136 mmol/L      Potassium 3.4 mmol/L      Chloride 102 mmol/L      CO2 26 mmol/L      ANION GAP 8 mmol/L      BUN 7 mg/dL      Creatinine 0.74 mg/dL      Glucose 80 mg/dL      Calcium 8.9 mg/dL      AST 16 U/L      ALT 17 U/L      Alkaline Phosphatase 47 U/L      Total Protein 7.3 g/dL      Albumin 4.1 g/dL      Total Bilirubin 0.40 mg/dL      eGFR 109 ml/min/1.73sq m     Narrative:      [employer] guidelines for Chronic Kidney Disease (CKD):     Stage 1 with normal or high GFR (GFR > 90 mL/min/1.73 square meters)    Stage 2 Mild CKD (GFR = 60-89 mL/min/1.73 square meters)    Stage 3A Moderate CKD (GFR = 45-59 mL/min/1.73 square meters)    Stage 3B Moderate CKD (GFR = 30-44 mL/min/1.73 square meters)    Stage 4 Severe CKD (GFR = 15-29 mL/min/1.73 square meters)    Stage 5 End Stage CKD (GFR <15 mL/min/1.73 square meters)  Note: GFR calculation is accurate only with a steady state creatinine    Lipase [[id number]]  (Normal) Collected: 12/06/24 1132    Lab Status: Final result Specimen: Blood from Arm, Left Updated: 12/06/24 1157     Lipase 43 u/L     CBC and differential [[id number]]  (Abnormal) Collected: 12/06/24 1132    Lab Status: Final result Specimen: Blood from Arm, Left Updated: 12/06/24 1140     WBC 7.89 Thousand/uL      RBC 4.53 Million/uL      Hemoglobin 13.8 g/dL      Hematocrit 41.1 %      MCV 91 fL      MCH 30.5 pg      MCHC 33.6 g/dL      RDW 12.2 %      MPV 8.9 fL      Platelets 408 Thousands/uL      nRBC 0 /100 WBCs      Segmented % 62 %      Immature Grans % 0 %      Lymphocytes % 28 %      Monocytes % 8 %      Eosinophils Relative 1 %      Basophils Relative 1 %      Absolute Neutrophils 4.90 Thousands/µL      Absolute Immature Grans 0.02 Thousand/uL      Absolute Lymphocytes 2.20 Thousands/µL      Absolute Monocytes 0.65 Thousand/µL      Eosinophils Absolute 0.08 Thousand/µL      Basophils Absolute  0.04 Thousands/µL             CT abdomen pelvis with contrast   Final Interpretation by Jenae Heard MD (12/06 5256)      1. No acute findings in the abdomen or pelvis to explain the patient's symptoms.      2. Postsurgical changes from recent cholecystectomy. Small amount of crescentic hyperattenuating material in the gallbladder fossa measuring 1.3 x 1.0 cm could be due to a postoperative hematoma/seroma.      Resident: MING BAKER I, the attending radiologist, have reviewed the images and agree with the final report above.      Workstation performed: [id number]             Procedures    ED Medication and Procedure Management   Prior to Admission Medications   Prescriptions Last Dose Informant Patient Reported? Taking?   Multiple Vitamins-Calcium (ONE-A-DAY WOMENS FORMULA PO)  Self Yes No   Sig: Take by mouth   buPROPion (Wellbutrin XL) 150 mg 24 hr tablet   No No   Sig: Take 1 tablet (150 mg total) by mouth daily   desvenlafaxine succinate (PRISTIQ) 50 mg 24 hr tablet   No No   Sig: Take 1 tablet (50 mg total) by mouth daily   levonorgestrel-ethinyl estradiol (SEASONALE) 0.15-0.03 MG per tablet   No No   Sig: Take 1 tablet by mouth daily   oxyCODONE (ROXICODONE) 5 immediate release tablet   No No   Sig: Take 1 tablet (5 mg total) by mouth every 4 (four) hours as needed for moderate pain for up to 10 doses Max Daily Amount: 30 mg      Facility-Administered Medications: None     Patient's Medications   Discharge Prescriptions    No medications on file     No discharge procedures on file.  ED SEPSIS DOCUMENTATION   Time reflects when diagnosis was documented in both MDM as applicable and the Disposition within this note       Time User Action Codes Description Comment    12/6/2024  4:04 PM Melita Valiente Add [R10.32] Left lower quadrant abdominal pain                  Melita Valiente MD  12/06/24 7987

## 2024-12-07 NOTE — PROGRESS NOTES
Ambulatory Visit  Name: Raiza Vicente      : 1995      MRN: [id number]  Encounter Provider: Dang Rodriguez DO  Encounter Date: 2024   Encounter department: [facility]    Assessment & Plan   1. Encounter for annual routine gynecological examination  2. PMS (premenstrual syndrome)  -     levonorgestrel-ethinyl estradiol (SEASONALE) 0.15-0.03 MG per tablet; Take 1 tablet by mouth daily    Pap done for cytology, reflex HPV.  Encouraged self breast examination as well as calcium supplementation.  Reviewed menstrual diary.  She will continue Seasonale x 1 year.  She will call with update in 2 months.  She will continue to follow-up with primary care and psychiatry as scheduled. return to office in 1 year or as needed      History of Present Illness     Raiza Vicente is a 29 y.o. female who presents     This is a pleasant 29-year-old female G0 who presents for GYN exam.  She has been on continuous OCPs since .  She has had some breakthrough bleeding this past month.  She feels her PMS symptoms have markedly improved.  She also started seeing a psychiatrist and medications were adjusted around the same time as starting continuous OCPs.  She denies any nausea vomiting or headaches.  There are no changes in bowel or bladder function.  She has been in a monogamous relationship, recently  2024 in [place].    She has adjusted her work schedule.  She is a  over the last 7 years.  She is now avoiding certain committees which has made her lifestyle a little bit easier.    Review of Systems   Constitutional:  Negative for fatigue, fever and unexpected weight change.   Respiratory:  Negative for cough, chest tightness, shortness of breath and wheezing.    Cardiovascular: Negative.  Negative for chest pain and palpitations.   Gastrointestinal: Negative.  Negative for abdominal distention, abdominal pain, blood in stool, constipation, diarrhea, nausea and vomiting.  [facility]  Hematology/Oncology  Progress Note    Patient Name: Katrina Duran  Admission Date: 12/3/2024  Hospital Length of Stay: 3 days  Code Status: DNR     Subjective:       HPI:Katrina Duran is a 85 y.o. female with PMHx of HTN, COPD, Lung Ca and CAD s/p CABG in 1999 and stent in 2006, brought to ER by EMS for severe lower back pain that radiates to her right leg since July of this year. Pt reports she is unable to stand and ambulate, like her legs are giving out upon standing and experiences excruciating pain when getting out of bed    Pt has hx lung cancer and received radiation in November of 2023.   CTA CAP 12/1/2204: Interval increase in angular opacity within the lingula, likely a combination of partial collapse with possible underlying mass. Interval increase in size of semi-solid right lower lobe nodule with increase central solid component, currently measuring 4.2 cm. Interval development of a 7 mm spiculated nodule in the right lung apex. Interval development of at least 2 hepatic masses measuring up to 3.9 cm. These changes must be considered neoplastic in origin until proven otherwise. A repeat PET CT scan would be helpful.     CT L/S on 12/1-  subtle nondisplaced fracture involving the inferior, posterior and left lateral corner of the L3 vertebral body. Marked degenerative disc changes and degenerative facet arthropathy noted diffusely with marked convex left curvature of the lumbar spine.  Multilevel, multifactorial moderate to severe spinal canal stenosis and nerve root foramen narrowing, most significantly involving the L3/L4 disc level.     MRI lumbar spine: 12/6/2024: Metastasis involving the L3, L4, L5 and S1 vertebra.  There also may be a metastasis involving the left iliac bone at the SI joint.     MRI cervical spine: No suspicious bone lesions suggest bone metastasis.     MRI thoracic spine 12/6/2024: Abnormal bone lesions involving T 10, T11 and T12 concerning for bone metastasis.         Hematology oncology consulted for management  She is in the room with her son daughters on the phone, patient reports significant back pain.  ECOG performance status is very poor about 3-4 at best          Medications:  Continuous Infusions:  Scheduled Meds:   albuterol-ipratropium  3 mL Nebulization Q6H WAKE    ampicillin-sulbactam  3 g Intravenous Q12H    arformoteroL  15 mcg Nebulization BID    atorvastatin  40 mg Oral Daily    budesonide  0.5 mg Nebulization Q12H    gabapentin  100 mg Oral TID    heparin (porcine)  5,000 Units Subcutaneous Q8H    LIDOcaine  1 patch Transdermal Q24H    morphine  2 mg Intravenous Once    mupirocin   Nasal BID    pantoprazole  40 mg Oral Daily    sertraline  100 mg Oral QHS    sodium chloride 0.9%  10 mL Intravenous Q8H     PRN Meds:  Current Facility-Administered Medications:     acetaminophen, 650 mg, Oral, Q4H PRN    dextrose 10%, 12.5 g, Intravenous, PRN    dextrose 10%, 25 g, Intravenous, PRN    HYDROcodone-acetaminophen, 1 tablet, Oral, Q6H PRN    HYDROmorphone, 0.5 mg, Intravenous, Q6H PRN    influenza (adjuvanted), 0.5 mL, Intramuscular, Prior to discharge    melatonin, 6 mg, Oral, Nightly PRN    morphine, 1 mg, Intravenous, Q4H PRN    naloxone, 0.02 mg, Intravenous, PRN    ondansetron, 8 mg, Oral, Q8H PRN    polyethylene glycol, 17 g, Oral, Daily PRN    promethazine, 25 mg, Oral, Q6H PRN    senna-docusate 8.6-50 mg, 1 tablet, Oral, BID PRN     Review of Systems   Constitutional:  Positive for appetite change, fatigue and unexpected weight change.   HENT:  Negative for mouth sores.    Eyes:  Negative for visual disturbance.   Respiratory:  Negative for cough and shortness of breath.    Cardiovascular:  Negative for chest pain.   Gastrointestinal:  Negative for abdominal pain and diarrhea.   Genitourinary:  Negative for frequency.   Musculoskeletal:  Positive for back pain.   Skin:  Negative for rash.   Neurological:  Negative for headaches.   Hematological:  Negative  "  Genitourinary: Negative.  Negative for difficulty urinating, dyspareunia, dysuria, flank pain, frequency, genital sores, hematuria, pelvic pain, urgency, vaginal bleeding, vaginal discharge and vaginal pain.   Skin:  Negative for rash.     Current Outpatient Medications on File Prior to Visit   Medication Sig Dispense Refill    buPROPion (Wellbutrin XL) 150 mg 24 hr tablet Take 1 tablet (150 mg total) by mouth daily Do not start before August 5, 2024. 30 tablet 2    desvenlafaxine succinate 25 MG TB24 Take 1 tablet (25 mg total) by mouth daily Can increase to 2 tablets once daily after 7 days if tolerated well. 60 tablet 2    Multiple Vitamins-Calcium (ONE-A-DAY WOMENS FORMULA PO) Take by mouth      [DISCONTINUED] levonorgestrel-ethinyl estradiol (SEASONALE) 0.15-0.03 MG per tablet TAKE 1 TABLET BY MOUTH DAILY 91 tablet 1    [DISCONTINUED] Biotin 10 MG CHEW Chew      [DISCONTINUED] buPROPion (WELLBUTRIN XL) 150 mg 24 hr tablet Take 2 tablets (300 mg total) by mouth every morning Do not start before July 29, 2024. 30 tablet 2     No current facility-administered medications on file prior to visit.      Objective     /82   Ht 5' 5\" (1.651 m)   Wt 80.9 kg (178 lb 6.4 oz)   LMP 08/23/2024 (Exact Date)   BMI 29.69 kg/m²     Physical Exam  Constitutional:       Appearance: Normal appearance. She is well-developed.   HENT:      Head: Normocephalic and atraumatic.   Cardiovascular:      Rate and Rhythm: Normal rate and regular rhythm.   Pulmonary:      Effort: Pulmonary effort is normal.      Breath sounds: Normal breath sounds.   Chest:   Breasts:     Right: No inverted nipple, mass, nipple discharge, skin change or tenderness.      Left: No inverted nipple, mass, nipple discharge, skin change or tenderness.   Abdominal:      General: Bowel sounds are normal. There is no distension.      Palpations: Abdomen is soft.      Tenderness: There is no abdominal tenderness. There is no guarding or rebound. "   Genitourinary:     Labia:         Right: No rash, tenderness or lesion.         Left: No rash, tenderness or lesion.       Vagina: Normal. No signs of injury. No vaginal discharge or tenderness.      Cervix: No cervical motion tenderness, discharge, friability, lesion or cervical bleeding.      Uterus: Not enlarged and not tender.       Adnexa:         Right: No mass, tenderness or fullness.          Left: No mass, tenderness or fullness.     Neurological:      Mental Status: She is alert.   Psychiatric:         Behavior: Behavior normal.       Administrative Statements   I have spent a total time of 30 minutes in caring for this patient on the day of the visit/encounter including Impressions, Counseling / Coordination of care, Documenting in the medical record, Reviewing / ordering tests, medicine, procedures  , and Obtaining or reviewing history  .         for adenopathy.   Psychiatric/Behavioral:  The patient is not nervous/anxious.    All other systems reviewed and are negative.    Objective:     Vital Signs (Most Recent):  Temp: 98.3 °F (36.8 °C) (12/07/24 0818)  Pulse: 89 (12/07/24 0835)  Resp: 18 (12/07/24 0818)  BP: (!) 171/77 (12/07/24 0818)  SpO2: (!) 90 % (12/07/24 0818) Vital Signs (24h Range):  Temp:  [97.5 °F (36.4 °C)-98.5 °F (36.9 °C)] 98.3 °F (36.8 °C)  Pulse:  [66-89] 89  Resp:  [17-20] 18  SpO2:  [90 %-96 %] 90 %  BP: (128-182)/(58-84) 171/77     Weight: 81.5 kg (179 lb 10.8 oz)  Body mass index is 31.83 kg/m².  Body surface area is 1.9 meters squared.      Intake/Output Summary (Last 24 hours) at 12/7/2024 1030  Last data filed at 12/7/2024 0307  Gross per 24 hour   Intake 2220 ml   Output 1500 ml   Net 720 ml       Physical Exam    Significant Labs:   CBC:   Recent Labs   Lab 12/06/24  0708 12/07/24  0550   WBC 10.72 9.61   HGB 9.5* 10.8*   HCT 30.0* 33.1*   * 174    and CMP:   Recent Labs   Lab 12/06/24  0708 12/07/24  0550   * 134*   K 4.4 4.5    106   CO2 21* 20*   * 118*   BUN 56* 44*   CREATININE 1.4 1.1   CALCIUM 8.7 8.9   PROT 5.4*  --    ALBUMIN 2.4*  --    BILITOT 0.5  --    ALKPHOS 90  --    AST 18  --    ALT 17  --    ANIONGAP 8 8       Diagnostic Results:  As above in HPI    Assessment/Plan:     Active Diagnoses:    Diagnosis Date Noted POA    PRINCIPAL PROBLEM:  Closed fracture of third lumbar vertebra [S32.039A] 12/03/2024 Yes    Urinary retention [R33.9] 12/06/2024 Yes    Acute hypoxemic respiratory failure [J96.01] 12/05/2024 No    Advanced care planning/counseling discussion [Z71.89] 12/05/2024 Not Applicable    UTI (urinary tract infection) [N39.0] 12/03/2024 Yes    Metastatic lung cancer (metastasis from lung to other site) [C34.90] 12/03/2024 Yes    NICKI (acute kidney injury) [N17.9] 12/03/2024 Yes    Pneumonia of left lung due to infectious organism [J18.9] 12/14/2017 Yes    HLD (hyperlipidemia) [E78.5]  06/28/2016 Yes      Problems Resolved During this Admission:     Mrs. Duran is a 85-year-old female with metastatic malignancy possibly of lung origin but metastatic spread noted in liver and lungs, patient is very frail and is in significant pain and bed-bound at home  Please consult palliative Care for pain management and Radiation Oncology to see if palliative radiation to the spine is an option.    Extensively discussed they poor prognosis they are interested in home hospice after the above has been sorted out    Discussed above with the patient's son as well as his 2 sisters so the family on the same page with above    Reviewed with primary team as well  Thank you for your consult. I will sign off. Please contact us if you have any additional questions.     Rochelle Collazo MD  Hematology/Oncology  [facility] - Med [facility]

## 2024-12-15 DIAGNOSIS — N94.3 PMS (PREMENSTRUAL SYNDROME): ICD-10-CM

## 2024-12-17 RX ORDER — LEVONORGESTREL AND ETHINYL ESTRADIOL 0.15-0.03
1 KIT ORAL DAILY
Qty: 91 TABLET | Refills: 1 | Status: SHIPPED | OUTPATIENT
Start: 2024-12-17

## 2025-01-06 ENCOUNTER — OFFICE VISIT (OUTPATIENT)
Dept: PSYCHIATRY | Facility: CLINIC | Age: 30
End: 2025-01-06
Payer: COMMERCIAL

## 2025-01-06 DIAGNOSIS — F33.1 MDD (MAJOR DEPRESSIVE DISORDER), RECURRENT EPISODE, MODERATE (HCC): ICD-10-CM

## 2025-01-06 DIAGNOSIS — F32.2 SEVERE MAJOR DEPRESSIVE DISORDER (HCC): ICD-10-CM

## 2025-01-06 DIAGNOSIS — F41.1 GENERALIZED ANXIETY DISORDER: Primary | ICD-10-CM

## 2025-01-06 PROCEDURE — 99214 OFFICE O/P EST MOD 30 MIN: CPT | Performed by: STUDENT IN AN ORGANIZED HEALTH CARE EDUCATION/TRAINING PROGRAM

## 2025-01-06 RX ORDER — BUPROPION HYDROCHLORIDE 150 MG/1
150 TABLET ORAL DAILY
Qty: 90 TABLET | Refills: 1 | Status: SHIPPED | OUTPATIENT
Start: 2025-01-06

## 2025-01-06 RX ORDER — DESVENLAFAXINE 50 MG/1
50 TABLET, FILM COATED, EXTENDED RELEASE ORAL DAILY
Qty: 90 TABLET | Refills: 1 | Status: SHIPPED | OUTPATIENT
Start: 2025-01-06

## 2025-01-06 NOTE — BH TREATMENT PLAN
TREATMENT PLAN (Medication Management Only)        [facility] - [facility]    Name/Date of Birth/MRN:  Raiza Vicente 29 y.o. 1995 MRN: [id number]  Date of Treatment Plan: January 6, 2025  Diagnosis/Diagnoses:   1. Generalized anxiety disorder    2. MDD (major depressive disorder), recurrent episode, moderate (HCC)    3. Severe major depressive disorder (HCC)      Strengths/Personal Resources for Self-Care: taking medications as prescribed, ability to adapt to life changes, ability to communicate needs.  Area/Areas of need (in own words): continue to maintain control of depressed mood and anxiety..  1. Long Term Goal:   continue to maintain control of depressed mood and anxiety..  Target Date: 6 months - 7/6/2025  Person/Persons responsible for completion of goal: Raiza  2.  Short Term Objective (s) - How will we reach this goal?:   A.  Provider new recommended medication/dosage changes and/or continue medication(s): continue current medications as prescribed.  B.  N/A.  C.  N/A.  Target Date: 3 months - 4/6/2025  Person/Persons Responsible for Completion of Goal: Raiza   Progress Towards Goals: continuing treatment  Treatment Modality: medication management every 1-3 months  Review due 180 days from date of this plan: 6 months - 7/6/2025  Expected length of service: ongoing treatment unless revised  My Physician/PA/NP and I have developed this plan together and I agree to work on the goals and objectives. I understand the treatment goals that were developed for my treatment.  Patient gave verbal consent and agreement to this treatment plan.   Electronic Signatures: on file (unless signed below)    Vee Chicas MD 01/06/25

## 2025-01-06 NOTE — PSYCH
"MEDICATION MANAGEMENT NOTE        [facility] - [facility]      Name and Date of Birth:  Raiza Vicente 29 y.o. 1995 MRN: [id number]    Date of Visit: January 6, 2025    Subjective:    Patient was seen for follow-up examination and the chart was reviewed. Patient today notes that she continues to do well. 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 We talked about burn-out today and pt does note feeling ineffectual in her job, dreading going to work especially on Sunday nights, anticipatory anxiety about work, no longer having a 'passion' for the career. 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 She rates her depressed mood at a 3/10 - we discussed some differences between burn-out and depression, pt does feel she has anhedonia, excessive sleeping when on breaks from school, does continue with trouble enjoying things. She wishes to continue current medications and feels she is at a \"good place\" on the medications. She does notice anxiety going in to work but agrees that work is never \"as bad\" as her anxiety leads her to \"believe\", especially regarding anxiety on Sundays going into Mondays. She also mentions with her sleep she was previously seeing a sleep doctor, had a sleep study done and is considering seeing this doctor again. She has tried melatonin before but didn't like that it made her feel groggy in the morning. We discussed also trazodone (she hasn't tried) which could have a similar effect of morning drowsiness. It seems trouble with sleep (waking up in the middle of the night in a \"panic\" and trouble falling back asleep) could be related to anticipatory fears about going to work. Will continue to discuss.    Notably " her former therapist left the practice - I have offered to set her up with a therapist here and she states she would like to hold on this. I also let her know she could look for a therapist on her own as well (outside of [facility]) which she understands.    She denies any side effects from medications.    Psychiatric Review Of Systems:     Sleep changes: no  Appetite changes: no  Auditory hallucinations: no  Visual hallucinations: no  Delusional thinking: no    Suicidal ideation: no  Homicidal ideation: no    Medical Review Of Systems:    Constitutional negative   ENT negative   Cardiovascular negative   Respiratory negative   Gastrointestinal negative   Genitourinary negative   Musculoskeletal negative   Integumentary negative   Neurological negative   Endocrine negative   Other Symptoms none, all other systems are negative       Objective:    Vital signs in last 24 hours:    There were no vitals filed for this visit.    Mental Status Evaluation:    Appearance age appropriate, casually dressed   Behavior cooperative, calm   Speech normal rate, normal volume, normal pitch   Mood euthymic   Affect normal range and intensity, appropriate   Thought Processes organized, goal directed   Associations intact associations   Thought Content no overt delusions   Perceptual Disturbances: no auditory hallucinations, no visual hallucinations   Abnormal Thoughts  Risk Potential Suicidal ideation - None  Homicidal ideation - None  Potential for aggression - No   Orientation oriented to person, place, time/date, and situation   Memory recent and remote memory grossly intact   Consciousness alert and awake   Attention Span Concentration Span attention span and concentration are age appropriate   Intellect appears to be of average intelligence   Insight intact   Judgement intact   Language No aphasia   Fund of Knowledge adequate fund of knowledge regarding past history  adequate fund of knowledge regarding vocabulary   "      Laboratory Results: I have reviewed the following laboratory results.    CBC:   Lab Results   Component Value Date    WBC 7.89 12/06/2024    RBC 4.53 12/06/2024    HGB 13.8 12/06/2024    HCT 41.1 12/06/2024    MCV 91 12/06/2024     (H) 12/06/2024    MCH 30.5 12/06/2024    MCHC 33.6 12/06/2024    RDW 12.2 12/06/2024    MPV 8.9 12/06/2024    NEUTROABS 4.90 12/06/2024     BMP:   Lab Results   Component Value Date    SODIUM 136 12/06/2024    K 3.4 (L) 12/06/2024     12/06/2024    CO2 26 12/06/2024    AGAP 8 12/06/2024    BUN 7 12/06/2024    CREATININE 0.74 12/06/2024    GLUC 80 12/06/2024    GLUF 75 11/14/2024    CALCIUM 8.9 12/06/2024    EGFR 109 12/06/2024     CMP:   Lab Results   Component Value Date    SODIUM 136 12/06/2024    K 3.4 (L) 12/06/2024     12/06/2024    CO2 26 12/06/2024    AGAP 8 12/06/2024    BUN 7 12/06/2024    CREATININE 0.74 12/06/2024    GLUC 80 12/06/2024    GLUF 75 11/14/2024    CALCIUM 8.9 12/06/2024    AST 16 12/06/2024    ALT 17 12/06/2024    ALKPHOS 47 12/06/2024    TP 7.3 12/06/2024    ALB 4.1 12/06/2024    TBILI 0.40 12/06/2024    EGFR 109 12/06/2024     Lipid Profile:   Lab Results   Component Value Date    HDL 45 07/27/2015    TRIG 108 07/27/2015    LDLCALC 103 (H) 07/27/2015     Hemoglobin A1C: No results found for: \"HGBA1C\", \"EAG\"  Liver Enzymes:   Lab Results   Component Value Date    AST 16 12/06/2024    ALT 17 12/06/2024    ALKPHOS 47 12/06/2024    PROT 7.3 07/27/2015    BILITOT 0.17 (L) 07/27/2015     Thyroid Studies:   Lab Results   Component Value Date    BJR4CGUYZXGQ 0.839 08/08/2024    FREET4 1.1 04/21/2014     Vitamin D Level No results found for: \"XJYV48GLUCGL\", \"NENU188UAZL\"  Vitamin B12 No results found for: \"BNQCIQXW63\"  EKG   Lab Results   Component Value Date    VENTRATE 88 08/21/2024    ATRIALRATE 88 08/21/2024    PRINT 150 08/21/2024    QRSDINT 82 08/21/2024    QTINT 334 08/21/2024    PAXIS 48 08/21/2024    QRSAXIS 82 08/21/2024    TWAVEAXIS 55 " 08/21/2024       Suicide/Homicide Risk Assessment:    Risk of Harm to Self:  The following ratings are based on assessment at the time of the interview  Demographic risk factors include:   Historical Risk Factors include: history of depression, history of anxiety  Recent Specific Risk Factors include: mental illness diagnosis, experienced fleeting suicidal ideation  Protective Factors: no current suicidal ideation, ability to adapt to change, access to mental health treatment, compliant with medications, compliant with mental health treatment, responsibilities and duties to others, restricted access to lethal means, stable living environment, stable job, supportive family, supportive friends  Weapons: none. The following steps have been taken to ensure weapons are properly secured: not applicable  Based on today's assessment, Raiza presents the following risk of harm to self: minimal     Risk of Harm to Others:  The following ratings are based on assessment at the time of the interview  Demographic Risk Factors include: none.  Historical Risk Factors include: none.  Recent Specific Risk Factors include: none.  Protective Factors: no current homicidal ideation, ability to adapt to change, access to mental health treatment, compliant with medications, compliant with mental health treatment, responsibilities and duties to others, restricted access to lethal means, stable living environment, stable job, supportive family, supportive friends  Weapons: none. The following steps have been taken to ensure weapons are properly secured: not applicable  Based on today's assessment, Raiza presents the following risk of harm to others: minimal     The following interventions are recommended: continue medication management      Assessment/Plan:      Diagnoses and all orders for this visit:    Generalized anxiety disorder  -     desvenlafaxine succinate (PRISTIQ) 50 mg 24 hr tablet; Take 1 tablet (50 mg total) by mouth  daily    MDD (major depressive disorder), recurrent episode, moderate (HCC)  -     desvenlafaxine succinate (PRISTIQ) 50 mg 24 hr tablet; Take 1 tablet (50 mg total) by mouth daily  -     buPROPion (Wellbutrin XL) 150 mg 24 hr tablet; Take 1 tablet (150 mg total) by mouth daily    Severe major depressive disorder (HCC)          Assessment & Plan  Generalized anxiety disorder  Improved, at goal.     - Continue Pristiq 24 hour tablet 50 mg once daily for depressed mood, anxiety  Orders:    desvenlafaxine succinate (PRISTIQ) 50 mg 24 hr tablet; Take 1 tablet (50 mg total) by mouth daily    MDD (major depressive disorder), recurrent episode, moderate (HCC)  Improved, at goal.     - Continue Wellbutrin  mg for adjunct for depressed mood  - Continue Pristiq 24 hour tablet 50 mg once daily for depressed mood, anxiety  Orders:    desvenlafaxine succinate (PRISTIQ) 50 mg 24 hr tablet; Take 1 tablet (50 mg total) by mouth daily    buPROPion (Wellbutrin XL) 150 mg 24 hr tablet; Take 1 tablet (150 mg total) by mouth daily    Severe major depressive disorder (HCC)                    Patient to follow-up in 3 months or sooner if needed.   Aware of 24 hour and weekend coverage for urgent situations accessed by calling [facility] main practice number    Medications Risks/Benefits:    Risks, Benefits And Possible Side Effects Of Medications:  Risks, benefits, and possible side effects of medications explained to Raiza and she verbalizes understanding and agreement for treatment.    Controlled Medication Discussion:   Not applicable - controlled prescriptions are not prescribed by this practice    Treatment Plan:  Completed and signed during the session: Yes - with Raiza    This note was not shared with the patient due to reasonable likelihood of causing patient harm     Visit Time  Visit Start Time: 4:30 PM  Visit Stop Time: 5:00 PM  Total Visit Duration:  30 minutes    Vee Chicas MD  01/06/25    This note has been constructed in part using a voice recognition system.   There may be translation, syntax, or grammatical errors. If you have any questions, please contact the dictating provider.

## 2025-01-28 ENCOUNTER — OFFICE VISIT (OUTPATIENT)
Dept: URGENT CARE | Age: 30
End: 2025-01-28
Payer: COMMERCIAL

## 2025-01-28 VITALS
BODY MASS INDEX: 28.32 KG/M2 | OXYGEN SATURATION: 99 % | HEIGHT: 65 IN | HEART RATE: 95 BPM | WEIGHT: 170 LBS | RESPIRATION RATE: 18 BRPM | TEMPERATURE: 97.6 F

## 2025-01-28 DIAGNOSIS — H10.31 ACUTE CONJUNCTIVITIS OF RIGHT EYE, UNSPECIFIED ACUTE CONJUNCTIVITIS TYPE: Primary | ICD-10-CM

## 2025-01-28 PROCEDURE — G0382 LEV 3 HOSP TYPE B ED VISIT: HCPCS

## 2025-01-28 PROCEDURE — S9083 URGENT CARE CENTER GLOBAL: HCPCS

## 2025-01-28 RX ORDER — OFLOXACIN 3 MG/ML
1 SOLUTION/ DROPS OPHTHALMIC 4 TIMES DAILY
Qty: 5 ML | Refills: 0 | Status: SHIPPED | OUTPATIENT
Start: 2025-01-28 | End: 2025-02-04

## 2025-01-28 NOTE — PROGRESS NOTES
Assessment/Plan  Please begin antibiotic drops as directed.   Please avoid contact lens use until treatment is completed and symptoms have resolved.   Ensure good hand hygiene.   Follow up with PCP or Ophthalmologist if no relief within one week.     Acute conjunctivitis of right eye, unspecified acute conjunctivitis type [H10.31]  1. Acute conjunctivitis of right eye, unspecified acute conjunctivitis type  ofloxacin (OCUFLOX) 0.3 % ophthalmic solution      Patient Education     Conjunctivitis (Pink Eye) ED   General Information   You came to the Emergency Department (ED) for pink eye. The medical name for this is conjunctivitis. Your eye is irritated or infected. Sometimes an allergy is bothering your eye. This means something in the air has come into contact with your eye and it is red and irritated. Your eye may also itch, burn, or be sensitive to light. If an infection is causing your pink eye, it is easy to spread from person to person.  Infections are often caused by viruses and antibiotics won’t help. Most of the time, pink eye will go away on its own without treatment after a few days.  If the doctor thinks you have a bacterial infection in your eye, you will need antibiotics to treat the infection. It is important to take all of your antibiotics even if your eye starts to feel better.  What care is needed at home?   Call your regular doctor to let them know you were in the ED. Make a follow-up appointment if you were told to.  Gently remove your eye drainage or crusting with a clean cloth and warm water.  Avoid pollen if an allergy is causing your pink eye. Stay inside as much as you can with the windows closed during peak allergy seasons.  Lubricating eye drops or allergy eye drops may help your eye feel better. Make sure to read the directions carefully. Wash your hands with care before and after you touch your eye.  When you use eye drops, take care not to touch the bottle or dropper to your eye.  If  you wear contact lenses, you will need to stop wearing them for a short time until your symptoms go away. If your contacts are disposable, start with fresh ones after your eye gets better. If not, clean your contacts with care. Clean your contact case thoroughly or get a new one.  Avoid sharing towels, washcloths, bedding, or personal items when you have pink eye.  You may need to stay out of work or school for a few days.  When do I need to call the doctor?   You have trouble seeing clearly after blinking.  Your eye is still red or has drainage after 3 days.  You have eye pain that is getting worse.  You have new or worsening symptoms.  Last Reviewed Date   2020-12-16  Consumer Information Use and Disclaimer   This generalized information is a limited summary of diagnosis, treatment, and/or medication information. It is not meant to be comprehensive and should be used as a tool to help the user understand and/or assess potential diagnostic and treatment options. It does NOT include all information about conditions, treatments, medications, side effects, or risks that may apply to a specific patient. It is not intended to be medical advice or a substitute for the medical advice, diagnosis, or treatment of a health care provider based on the health care provider's examination and assessment of a patient’s specific and unique circumstances. Patients must speak with a health care provider for complete information about their health, medical questions, and treatment options, including any risks or benefits regarding use of medications. This information does not endorse any treatments or medications as safe, effective, or approved for treating a specific patient. [employer] and its affiliates disclaim any warranty or liability relating to this information or the use thereof. The use of this information is governed by the Terms of Use, available at https://www.[employer].com/en/know/clinical-effectiveness-terms    Copyright   Copyright © 2024 [employer] and its affiliates and/or licensors. All rights reserved.      Subjective:     Patient ID: Raiza Vicente is a 29 y.o. female.      Reason For Visit / Chief Complaint  Chief Complaint   Patient presents with    Conjunctivitis     2 days itch and watery drng to left eye... right also getting irritatied         Patient wears contact lenses, denies eye pain or vision changes.     Conjunctivitis   The current episode started 2 days ago. The onset was gradual. The problem occurs continuously. The problem has been gradually worsening. The problem is mild. Nothing relieves the symptoms. Associated symptoms include eye itching, eye discharge and eye redness. Pertinent negatives include no fever, no decreased vision, no double vision, no photophobia, no diarrhea, no nausea, no vomiting, no congestion, no ear discharge, no ear pain, no headaches, no hearing loss, no mouth sores, no rhinorrhea, no sore throat, no stridor, no swollen glands, no neck pain, no cough, no rash and no eye pain.           Past Medical History:   Diagnosis Date    Anxiety 2014    COVID-19 01/27/2021    Depression     Exposure to influenza 02/01/2018       Past Surgical History:   Procedure Laterality Date    NO PAST SURGERIES      SD LAPAROSCOPY SURG CHOLECYSTECTOMY N/A 11/14/2024    Procedure: CHOLECYSTECTOMY LAPAROSCOPIC W/ ROBOTICS;  Surgeon: Carlos Alberto Santacruz MD;  Location: [facility];  Service: General       Family History   Problem Relation Age of Onset    No Known Problems Mother     Diabetes Father     Cervical cancer Sister     Cancer Sister     Depression Sister     No Known Problems Sister     No Known Problems Sister     No Known Problems Sister     No Known Problems Brother     Bipolar disorder Paternal Uncle     Alcohol abuse Maternal Uncle     Mental illness Paternal Uncle         Bipolar    Bipolar disorder Paternal Uncle     Depression Sister     Anxiety disorder Sister        Review of  "Systems   Constitutional:  Negative for fatigue and fever.   HENT:  Negative for congestion, ear discharge, ear pain, hearing loss, mouth sores, postnasal drip, rhinorrhea, sinus pressure, sinus pain, sneezing and sore throat.    Eyes:  Positive for discharge, redness and itching. Negative for double vision, photophobia, pain and visual disturbance.   Respiratory: Negative.  Negative for apnea, cough, choking, chest tightness, shortness of breath and stridor.    Cardiovascular: Negative.  Negative for chest pain and palpitations.   Gastrointestinal: Negative.  Negative for diarrhea, nausea and vomiting.   Endocrine: Negative.  Negative for polydipsia, polyphagia and polyuria.   Genitourinary: Negative.  Negative for decreased urine volume and flank pain.   Musculoskeletal: Negative.  Negative for arthralgias, back pain, myalgias, neck pain and neck stiffness.   Skin: Negative.  Negative for color change and rash.   Allergic/Immunologic: Negative.  Negative for environmental allergies.   Neurological: Negative.  Negative for dizziness, facial asymmetry, light-headedness, numbness and headaches.   Hematological: Negative.  Negative for adenopathy.   Psychiatric/Behavioral: Negative.         Objective:    Pulse 95   Temp 97.6 °F (36.4 °C)   Resp 18   Ht 5' 5\" (1.651 m)   Wt 77.1 kg (170 lb)   SpO2 99%   BMI 28.29 kg/m²     Physical Exam  Vitals and nursing note reviewed.   Constitutional:       General: She is not in acute distress.     Appearance: Normal appearance. She is not ill-appearing, toxic-appearing or diaphoretic.   HENT:      Head: Normocephalic and atraumatic.      Right Ear: Tympanic membrane, ear canal and external ear normal. There is no impacted cerumen.      Left Ear: Tympanic membrane, ear canal and external ear normal. There is no impacted cerumen.      Nose: Nose normal. No congestion or rhinorrhea.      Mouth/Throat:      Mouth: Mucous membranes are moist.   Eyes:      General: Lids are " normal. Lids are everted, no foreign bodies appreciated. Vision grossly intact. Gaze aligned appropriately. No allergic shiner, visual field deficit or scleral icterus.        Right eye: No foreign body, discharge or hordeolum.         Left eye: No foreign body, discharge or hordeolum.      Extraocular Movements: Extraocular movements intact.      Right eye: Normal extraocular motion and no nystagmus.      Left eye: Normal extraocular motion and no nystagmus.      Conjunctiva/sclera:      Right eye: Right conjunctiva is injected. No chemosis, exudate or hemorrhage.     Left eye: Left conjunctiva is not injected. No chemosis, exudate or hemorrhage.     Pupils: Pupils are equal, round, and reactive to light.      Comments: Mild injection of medial aspect of right eye with some crusting of the lashes.    Cardiovascular:      Rate and Rhythm: Normal rate and regular rhythm.      Pulses: Normal pulses.      Heart sounds: Normal heart sounds. No murmur heard.     No friction rub. No gallop.   Pulmonary:      Effort: Pulmonary effort is normal. No respiratory distress.      Breath sounds: Normal breath sounds. No stridor. No wheezing, rhonchi or rales.   Chest:      Chest wall: No tenderness.   Abdominal:      General: Bowel sounds are normal.      Palpations: Abdomen is soft.      Tenderness: There is no abdominal tenderness. There is no guarding or rebound.   Musculoskeletal:         General: Normal range of motion.      Cervical back: Normal range of motion and neck supple. No rigidity or tenderness.   Lymphadenopathy:      Cervical: No cervical adenopathy.   Skin:     General: Skin is warm and dry.      Capillary Refill: Capillary refill takes less than 2 seconds.   Neurological:      General: No focal deficit present.      Mental Status: She is alert and oriented to person, place, and time.      Cranial Nerves: No cranial nerve deficit.   Psychiatric:         Mood and Affect: Mood normal.         Behavior: Behavior  normal.

## 2025-01-28 NOTE — PATIENT INSTRUCTIONS
Please begin antibiotic drops as directed.   Please avoid contact lens use until treatment is completed and symptoms have resolved.   Ensure good hand hygiene.   Follow up with PCP or Ophthalmologist if no relief within one week.

## 2025-01-28 NOTE — LETTER
January 28, 2025     Patient: Raiza Vicente   YOB: 1995   Date of Visit: 1/28/2025       To Whom it May Concern:    Raiza Vicente was seen in my clinic on 1/28/2025. She may return on 01/30/2025.     If you have any questions or concerns, please don't hesitate to call.         Sincerely,          COREY Joseph        CC: No Recipients

## 2025-04-01 ENCOUNTER — OFFICE VISIT (OUTPATIENT)
Dept: FAMILY MEDICINE CLINIC | Facility: CLINIC | Age: 30
End: 2025-04-01
Payer: COMMERCIAL

## 2025-04-01 VITALS
SYSTOLIC BLOOD PRESSURE: 120 MMHG | WEIGHT: 175 LBS | OXYGEN SATURATION: 100 % | DIASTOLIC BLOOD PRESSURE: 80 MMHG | HEART RATE: 90 BPM | BODY MASS INDEX: 29.16 KG/M2 | TEMPERATURE: 98.3 F | HEIGHT: 65 IN

## 2025-04-01 DIAGNOSIS — Z13.29 SCREENING FOR THYROID DISORDER: ICD-10-CM

## 2025-04-01 DIAGNOSIS — R23.8 SKIN IRRITATION: Primary | ICD-10-CM

## 2025-04-01 PROCEDURE — 99213 OFFICE O/P EST LOW 20 MIN: CPT | Performed by: NURSE PRACTITIONER

## 2025-04-01 NOTE — PROGRESS NOTES
Name: Raiza Vicente      : 1995      MRN: [id number]  Encounter Provider: COREY Krueger  Encounter Date: 2025   Encounter department: [facility]  :  Assessment & Plan  Skin irritation  Dry, rough skin on face with mild flushing of skin on cheeks, temples with few, small pink papules on cheeks.  Discussed considering change of facial cleanser and facial moisturizer to more hydrating products.  She could consider a cleanser with salicylic acid as well though it is unknown if that will cause irritation.  She is scheduled to see a dermatologist in January; a referral was provided.  She will also see if she can get in sooner elsewhere,   Orders:    Ambulatory Referral to Dermatology; Future    TSH, 3rd generation with Free T4 reflex; Future    Screening for thyroid disorder  Having issues with very dry skin; no reported changes to skin care, lifestyle.  Will repeat TSH  Orders:    TSH, 3rd generation with Free T4 reflex; Future           History of Present Illness   Pt is a 29 y.o. female who is seen today for evaluation of a rash.  Past medical history of obstructive sleep apnea, major depression, generalized anxiety disorder.  She says she is concerned about the skin on her face.  For the last 4 weeks she noted that her face has seemed very red and irritated.   Prior to noticing this, she does not recall changing any skin care products.  She says her skin feels dry, but she always has dry skin.  She says that after washing her face her skin feels so dry and tight.  She is washing her face with cetaphil which she has used since she was 15 and is washing 2-3 times per day.  She uses olay facial moisturizer.  She has stopped any other skin products that she was using to see if it made a difference.  She notes that since she had her gall bladder out in November. she has been able to go back to eating out and is able to eat some of the higher fat foods that she was not able to eat  "prior to surgery.  History of acne, previously took acutaine and skin had been clear since.      Review of Systems   Constitutional:  Negative for chills, fatigue and fever.   Skin:  Positive for color change and rash.   Neurological:  Negative for dizziness, light-headedness and headaches.       Objective   /80 (BP Location: Left arm)   Pulse 90   Temp 98.3 °F (36.8 °C)   Ht 5' 5\" (1.651 m)   Wt 79.4 kg (175 lb)   SpO2 100%   BMI 29.12 kg/m²      Physical Exam  Vitals reviewed.   Constitutional:       General: She is awake. She is not in acute distress.     Appearance: Normal appearance. She is well-developed. She is not ill-appearing.   Eyes:      General: Lids are normal.      Conjunctiva/sclera: Conjunctivae normal.   Pulmonary:      Effort: Pulmonary effort is normal. No tachypnea, accessory muscle usage or respiratory distress.   Skin:     General: Skin is dry.      Coloration: Skin is not pale.      Findings: No erythema or rash.      Comments: Dry, rough skin on face; slight flush to cheeks and temples.  Cheeks have few scattered small pink papules   Neurological:      Mental Status: She is alert and oriented to person, place, and time.   Psychiatric:         Attention and Perception: Attention normal.         Mood and Affect: Mood normal.         Speech: Speech normal.         Behavior: Behavior normal. Behavior is cooperative.         Thought Content: Thought content normal.         Cognition and Memory: Cognition normal.         Judgment: Judgment normal.         "

## 2025-04-07 ENCOUNTER — TELEPHONE (OUTPATIENT)
Age: 30
End: 2025-04-07

## 2025-05-02 ENCOUNTER — TELEPHONE (OUTPATIENT)
Dept: FAMILY MEDICINE CLINIC | Facility: CLINIC | Age: 30
End: 2025-05-02

## 2025-06-30 DIAGNOSIS — N94.3 PMS (PREMENSTRUAL SYNDROME): ICD-10-CM

## 2025-07-01 RX ORDER — LEVONORGESTREL / ETHINYL ESTRADIOL 0.15-0.03
1 KIT ORAL DAILY
Qty: 91 TABLET | Refills: 0 | Status: SHIPPED | OUTPATIENT
Start: 2025-07-01

## 2025-07-18 DIAGNOSIS — F33.1 MDD (MAJOR DEPRESSIVE DISORDER), RECURRENT EPISODE, MODERATE (HCC): ICD-10-CM

## 2025-07-18 DIAGNOSIS — F41.1 GENERALIZED ANXIETY DISORDER: ICD-10-CM

## 2025-07-18 NOTE — PSYCH
"MEDICATION MANAGEMENT NOTE        [facility]      Name and Date of Birth:  Raiza Vicente 30 y.o. 1995 MRN: [id number]    Date of Visit: July 21, 2025    Subjective:    Patient was seen for follow-up examination and the chart was reviewed.  Patient today discussed she is doing overall well.  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  She rates her anxiety as a \"7 out of 10\", with 10 as the most anxiety, and discusses her anxiety is predominantly about looking for jobs and about whether or not she will be called for an interview.  She is hopeful to find another job potentially in a nonprofit, has also been looking at job related to \"curriculum\".  If she does not find another job in that time she will return back to school August 20.  She discussed some difficulty with low motivation and lack of routine during the summer months, she states that after 3 days out of the week she will have low motivation and will continue to do things such as go for walks however this will not seem to improve her motivation.  She notes also some weight gain, notably she did get her gallbladder removed last November 2024 and initially lost some weight however seems to have gained some weight which she attributes to \"habits\".    We agreed to continue her current medication regimen as she feels it has been extremely helpful at the current doses, notably we decreased her Wellbutrin XL from 300 mg 250 mg all the way back in last October 2024 and she has been doing much better on the lower dose as she has felt she has much less irritability.  Depression is at a minimum, see below PHQ-9, and is in the setting of job searching as well which has been very stressful for her.  Notably she is going on a trip on Sunday to [place] to a beach near [facility], \"[facility]\", and she is looking forward to that.     She denies any side effects from medications.    Current Rating " "Scores:    Current PHQ-9   PHQ-2/9 Depression Screening    Little interest or pleasure in doing things: 1 - several days  Feeling down, depressed, or hopeless: 1 - several days  Trouble falling or staying asleep, or sleeping too much: 2 - more than half the days  Feeling tired or having little energy: 1 - several days  Poor appetite or overeatin - several days  Feeling bad about yourself - or that you are a failure or have let yourself or your family down: 1 - several days  Trouble concentrating on things, such as reading the newspaper or watching television: 1 - several days  Moving or speaking so slowly that other people could have noticed. Or the opposite - being so fidgety or restless that you have been moving around a lot more than usual: 0 - not at all  Thoughts that you would be better off dead, or of hurting yourself in some way: 0 - not at all  PHQ-9 Score: 8  PHQ-9 Interpretation: Mild depression         Psychiatric Review Of Systems:     Sleep changes: no  Appetite changes: no  Auditory hallucinations: no  Visual hallucinations: no  Delusional thinking: no    Suicidal ideation: no  Homicidal ideation: no    Medical Review Of Systems:    Constitutional negative   ENT negative   Cardiovascular negative   Respiratory negative   Gastrointestinal negative   Genitourinary negative   Musculoskeletal negative   Integumentary negative   Neurological negative   Endocrine negative   Other Symptoms none, all other systems are negative       Objective:    Vital signs in last 24 hours:    There were no vitals filed for this visit.    Mental Status Evaluation:    Appearance age appropriate, casually dressed   Behavior cooperative, calm   Speech normal rate, normal volume, normal pitch   Mood \"Anxious\"   Affect normal range and intensity, appropriate   Thought Processes organized, goal directed   Associations intact associations   Thought Content no overt delusions   Perceptual Disturbances: no auditory " "hallucinations, no visual hallucinations   Abnormal Thoughts  Risk Potential Suicidal ideation - None  Homicidal ideation - None  Potential for aggression - No   Orientation oriented to person, place, time/date, and situation   Memory recent and remote memory grossly intact   Consciousness alert and awake   Attention Span Concentration Span attention span and concentration are age appropriate   Intellect appears to be of average intelligence   Insight intact   Judgement intact   Language No aphasia   Fund of Knowledge adequate fund of knowledge regarding past history  adequate fund of knowledge regarding vocabulary        Laboratory Results: I have reviewed the following laboratory results.    CBC:   Lab Results   Component Value Date    WBC 7.89 12/06/2024    RBC 4.53 12/06/2024    HGB 13.8 12/06/2024    HCT 41.1 12/06/2024    MCV 91 12/06/2024     (H) 12/06/2024    MCH 30.5 12/06/2024    MCHC 33.6 12/06/2024    RDW 12.2 12/06/2024    MPV 8.9 12/06/2024    NEUTROABS 4.90 12/06/2024     BMP:   Lab Results   Component Value Date    SODIUM 136 12/06/2024    K 3.4 (L) 12/06/2024     12/06/2024    CO2 26 12/06/2024    AGAP 8 12/06/2024    BUN 7 12/06/2024    CREATININE 0.74 12/06/2024    GLUC 80 12/06/2024    GLUF 75 11/14/2024    CALCIUM 8.9 12/06/2024    EGFR 109 12/06/2024     CMP:   Lab Results   Component Value Date    SODIUM 136 12/06/2024    K 3.4 (L) 12/06/2024     12/06/2024    CO2 26 12/06/2024    AGAP 8 12/06/2024    BUN 7 12/06/2024    CREATININE 0.74 12/06/2024    GLUC 80 12/06/2024    GLUF 75 11/14/2024    CALCIUM 8.9 12/06/2024    AST 16 12/06/2024    ALT 17 12/06/2024    ALKPHOS 47 12/06/2024    TP 7.3 12/06/2024    ALB 4.1 12/06/2024    TBILI 0.40 12/06/2024    EGFR 109 12/06/2024     Lipid Profile:   Lab Results   Component Value Date    HDL 45 07/27/2015    TRIG 108 07/27/2015    LDLCALC 103 (H) 07/27/2015     Hemoglobin A1C: No results found for: \"HGBA1C\", \"EAG\"  Liver Enzymes: " "  Lab Results   Component Value Date    AST 16 12/06/2024    ALT 17 12/06/2024    ALKPHOS 47 12/06/2024    PROT 7.3 07/27/2015    BILITOT 0.17 (L) 07/27/2015     Thyroid Studies:   Lab Results   Component Value Date    KRR6NRBCKMEJ 0.839 08/08/2024    FREET4 1.1 04/21/2014     Vitamin D Level No results found for: \"WTLX05QGETFY\", \"ASGS649LIBZ\"  Vitamin B12 No results found for: \"LEISIKYA17\"  EKG   Lab Results   Component Value Date    VENTRATE 88 08/21/2024    ATRIALRATE 88 08/21/2024    PRINT 150 08/21/2024    QRSDINT 82 08/21/2024    QTINT 334 08/21/2024    PAXIS 48 08/21/2024    QRSAXIS 82 08/21/2024    TWAVEAXIS 55 08/21/2024       Suicide/Homicide Risk Assessment:    Risk of Harm to Self:  The following ratings are based on assessment at the time of the interview  Demographic risk factors include:   Historical Risk Factors include: history of depression, history of anxiety  Recent Specific Risk Factors include: mental illness diagnosis, experienced fleeting suicidal ideation  Protective Factors: no current suicidal ideation, ability to adapt to change, access to mental health treatment, compliant with medications, compliant with mental health treatment, responsibilities and duties to others, restricted access to lethal means, stable living environment, stable job, supportive family, supportive friends  Weapons: none. The following steps have been taken to ensure weapons are properly secured: not applicable  Based on today's assessment, Raiza presents the following risk of harm to self: minimal     Risk of Harm to Others:  The following ratings are based on assessment at the time of the interview  Demographic Risk Factors include: none.  Historical Risk Factors include: none.  Recent Specific Risk Factors include: none.  Protective Factors: no current homicidal ideation, ability to adapt to change, access to mental health treatment, compliant with medications, compliant with mental health treatment, " responsibilities and duties to others, restricted access to lethal means, stable living environment, stable job, supportive family, supportive friends  Weapons: none. The following steps have been taken to ensure weapons are properly secured: not applicable  Based on today's assessment, Raiza presents the following risk of harm to others: minimal       The following interventions are recommended: continue medication management      Assessment/Plan:      Diagnoses and all orders for this visit:    Generalized anxiety disorder  -     desvenlafaxine succinate (PRISTIQ) 50 mg 24 hr tablet; Take 1 tablet (50 mg total) by mouth daily    MDD (major depressive disorder), recurrent episode, moderate (HCC)  -     buPROPion (Wellbutrin XL) 150 mg 24 hr tablet; Take 1 tablet (150 mg total) by mouth daily  -     desvenlafaxine succinate (PRISTIQ) 50 mg 24 hr tablet; Take 1 tablet (50 mg total) by mouth daily    Severe major depressive disorder (HCC)          Assessment & Plan  Generalized anxiety disorder  Increased very recently in the setting of stressor of looking for another job, however overall improved and stable  - Continue Pristiq 24 hour tablet 50 mg once daily for depressed mood, anxiety    Orders:    desvenlafaxine succinate (PRISTIQ) 50 mg 24 hr tablet; Take 1 tablet (50 mg total) by mouth daily     MDD (major depressive disorder), recurrent episode, moderate (HCC)  Improved, at goal.  - Continue Pristiq 24 hour tablet 50 mg once daily for depressed mood, anxiety  - Continue Wellbutrin  mg for adjunct for depressed mood  - Have sent her prescription to her [facility] which she prefers, she is going on a trip on Sunday and will call the pharmacy to see if her prescription will arrive before then.  If not I discussed she can let us know and I can call the pharmacy to try to expedite this          Orders:    buPROPion (Wellbutrin XL) 150 mg 24 hr tablet; Take 1 tablet (150 mg total) by mouth daily     desvenlafaxine succinate (PRISTIQ) 50 mg 24 hr tablet; Take 1 tablet (50 mg total) by mouth daily    Severe major depressive disorder (HCC)                       Patient to follow-up in 3 months or sooner if needed.   Aware of 24 hour and weekend coverage for urgent situations accessed by calling [facility] main practice number    Medications Risks/Benefits:    Risks, Benefits And Possible Side Effects Of Medications:  Risks, benefits, and possible side effects of medications explained to Raiza and she verbalizes understanding and agreement for treatment.    Controlled Medication Discussion:   Not applicable - controlled prescriptions are not prescribed by this practice    Treatment Plan:  Completed and signed during the session: Not applicable - Treatment Plan not due at this session    This note was not shared with the patient due to this is a psychotherapy note     Visit Time  Visit Start Time: 1:30 PM  Visit Stop Time: 2:00 PM  Total Visit Duration: 30 minutes    Vee Chicas MD 07/21/25    This note has been constructed in part using a voice recognition system.   There may be translation, syntax, or grammatical errors. If you have any questions, please contact the dictating provider.

## 2025-07-18 NOTE — ASSESSMENT & PLAN NOTE
Increased very recently in the setting of stressor of looking for another job, however overall improved and stable  - Continue Pristiq 24 hour tablet 50 mg once daily for depressed mood, anxiety    Orders:    desvenlafaxine succinate (PRISTIQ) 50 mg 24 hr tablet; Take 1 tablet (50 mg total) by mouth daily

## 2025-07-21 ENCOUNTER — OFFICE VISIT (OUTPATIENT)
Dept: PSYCHIATRY | Facility: CLINIC | Age: 30
End: 2025-07-21
Payer: COMMERCIAL

## 2025-07-21 DIAGNOSIS — F41.1 GENERALIZED ANXIETY DISORDER: Primary | ICD-10-CM

## 2025-07-21 DIAGNOSIS — F33.1 MDD (MAJOR DEPRESSIVE DISORDER), RECURRENT EPISODE, MODERATE (HCC): ICD-10-CM

## 2025-07-21 DIAGNOSIS — F32.2 SEVERE MAJOR DEPRESSIVE DISORDER (HCC): ICD-10-CM

## 2025-07-21 PROCEDURE — 99214 OFFICE O/P EST MOD 30 MIN: CPT | Performed by: STUDENT IN AN ORGANIZED HEALTH CARE EDUCATION/TRAINING PROGRAM

## 2025-07-21 RX ORDER — DESVENLAFAXINE 50 MG/1
50 TABLET, FILM COATED, EXTENDED RELEASE ORAL DAILY
Qty: 90 TABLET | Refills: 1 | Status: SHIPPED | OUTPATIENT
Start: 2025-07-21

## 2025-07-21 RX ORDER — BUPROPION HYDROCHLORIDE 150 MG/1
150 TABLET ORAL DAILY
Qty: 90 TABLET | Refills: 3 | OUTPATIENT
Start: 2025-07-21

## 2025-07-21 RX ORDER — BUPROPION HYDROCHLORIDE 150 MG/1
150 TABLET ORAL DAILY
Qty: 90 TABLET | Refills: 1 | Status: SHIPPED | OUTPATIENT
Start: 2025-07-21

## 2025-07-21 RX ORDER — DESVENLAFAXINE 50 MG/1
50 TABLET, FILM COATED, EXTENDED RELEASE ORAL DAILY
Qty: 90 TABLET | Refills: 3 | OUTPATIENT
Start: 2025-07-21

## (undated) DEVICE — BLADELESS OBTURATOR: Brand: WECK VISTA

## (undated) DEVICE — PACK PBDS LAP CHOLE RF

## (undated) DEVICE — GLOVE INDICATOR PI UNDERGLOVE SZ 8 BLUE

## (undated) DEVICE — DRAPE SHEET THREE QUARTER

## (undated) DEVICE — SUT VICRYL PLUS 0 UR-6 27IN VCP603H

## (undated) DEVICE — HEAVY DUTY TABLE COVER: Brand: CONVERTORS

## (undated) DEVICE — SUT MONOCRYL 4-0 PS-2 18 IN Y496G

## (undated) DEVICE — CLAMP TOWEL TUBING DISPOSABLE

## (undated) DEVICE — INTENDED FOR TISSUE SEPARATION, AND OTHER PROCEDURES THAT REQUIRE A SHARP SURGICAL BLADE TO PUNCTURE OR CUT.: Brand: BARD-PARKER SAFETY BLADES SIZE 15, STERILE

## (undated) DEVICE — EXOFIN PRECISION PEN HIGH VISCOSITY TOPICAL SKIN ADHESIVE: Brand: EXOFIN PRECISION PEN, 1G

## (undated) DEVICE — TUBING SMOKE EVAC W/FILTRATION DEVICE PLUMEPORT ACTIV

## (undated) DEVICE — 3M™ IOBAN™ 2 ANTIMICROBIAL INCISE DRAPE 6650EZ: Brand: IOBAN™ 2

## (undated) DEVICE — 3000CC GUARDIAN II: Brand: GUARDIAN

## (undated) DEVICE — HEM-O-LOK CLIP CARTRIDGE MED/LARGE DA VINCI SI/XI

## (undated) DEVICE — CANNULA SEAL

## (undated) DEVICE — ARM DRAPE

## (undated) DEVICE — INTENDED FOR TISSUE SEPARATION, AND OTHER PROCEDURES THAT REQUIRE A SHARP SURGICAL BLADE TO PUNCTURE OR CUT.: Brand: BARD-PARKER SAFETY BLADES SIZE 11, STERILE

## (undated) DEVICE — SUT SILK 2-0 18 IN A185H

## (undated) DEVICE — PERMANENT CAUTERY HOOK: Brand: ENDOWRIST

## (undated) DEVICE — LAPAROTOMY DRAPE WITH POUCHES: Brand: CONVERTORS

## (undated) DEVICE — FORCE BIPOLAR: Brand: ENDOWRIST

## (undated) DEVICE — COLUMN DRAPE

## (undated) DEVICE — REDUCER: Brand: ENDOWRIST

## (undated) DEVICE — GLOVE SRG BIOGEL ORTHOPEDIC 7.5

## (undated) DEVICE — MEDIUM-LARGE CLIP APPLIER: Brand: ENDOWRIST

## (undated) DEVICE — PAD GROUNDING DUAL ADULT

## (undated) DEVICE — VISUALIZATION SYSTEM: Brand: CLEARIFY

## (undated) DEVICE — INSUFFLATION NEEDLE TO ESTABLISH PNEUMOPERITONEUM.: Brand: INSUFFLATION NEEDLE

## (undated) DEVICE — SEAL